# Patient Record
Sex: FEMALE | Race: WHITE | NOT HISPANIC OR LATINO | Employment: FULL TIME | URBAN - METROPOLITAN AREA
[De-identification: names, ages, dates, MRNs, and addresses within clinical notes are randomized per-mention and may not be internally consistent; named-entity substitution may affect disease eponyms.]

---

## 2017-02-08 ENCOUNTER — OFFICE VISIT (OUTPATIENT)
Dept: OBGYN | Facility: CLINIC | Age: 32
End: 2017-02-08
Attending: ADVANCED PRACTICE MIDWIFE
Payer: COMMERCIAL

## 2017-02-08 VITALS
HEIGHT: 70 IN | BODY MASS INDEX: 32.93 KG/M2 | WEIGHT: 230 LBS | DIASTOLIC BLOOD PRESSURE: 67 MMHG | SYSTOLIC BLOOD PRESSURE: 104 MMHG

## 2017-02-08 DIAGNOSIS — O09.91 SUPERVISION OF HIGH RISK PREGNANCY IN FIRST TRIMESTER: Primary | ICD-10-CM

## 2017-02-08 DIAGNOSIS — Z34.81 ENCOUNTER FOR SUPERVISION OF OTHER NORMAL PREGNANCY IN FIRST TRIMESTER: Primary | ICD-10-CM

## 2017-02-08 DIAGNOSIS — Z98.891 HISTORY OF C-SECTION: ICD-10-CM

## 2017-02-08 LAB
ABO + RH BLD: NORMAL
ABO + RH BLD: NORMAL
BASOPHILS # BLD AUTO: 0 10E9/L (ref 0–0.2)
BASOPHILS NFR BLD AUTO: 0.2 %
BLD GP AB SCN SERPL QL: NORMAL
BLOOD BANK CMNT PATIENT-IMP: NORMAL
DEPRECATED CALCIDIOL+CALCIFEROL SERPL-MC: 28 UG/L (ref 20–75)
DIFFERENTIAL METHOD BLD: ABNORMAL
EOSINOPHIL # BLD AUTO: 0 10E9/L (ref 0–0.7)
EOSINOPHIL NFR BLD AUTO: 0.4 %
ERYTHROCYTE [DISTWIDTH] IN BLOOD BY AUTOMATED COUNT: 12.7 % (ref 10–15)
HBV SURFACE AG SERPL QL IA: NONREACTIVE
HCT VFR BLD AUTO: 39.8 % (ref 35–47)
HGB BLD-MCNC: 13.6 G/DL (ref 11.7–15.7)
HIV 1+2 AB+HIV1 P24 AG SERPL QL IA: NORMAL
IMM GRANULOCYTES # BLD: 0 10E9/L (ref 0–0.4)
IMM GRANULOCYTES NFR BLD: 0.4 %
LYMPHOCYTES # BLD AUTO: 2.3 10E9/L (ref 0.8–5.3)
LYMPHOCYTES NFR BLD AUTO: 20.8 %
MCH RBC QN AUTO: 31.1 PG (ref 26.5–33)
MCHC RBC AUTO-ENTMCNC: 34.2 G/DL (ref 31.5–36.5)
MCV RBC AUTO: 91 FL (ref 78–100)
MONOCYTES # BLD AUTO: 0.6 10E9/L (ref 0–1.3)
MONOCYTES NFR BLD AUTO: 5 %
NEUTROPHILS # BLD AUTO: 8.2 10E9/L (ref 1.6–8.3)
NEUTROPHILS NFR BLD AUTO: 73.2 %
NRBC # BLD AUTO: 0 10*3/UL
NRBC BLD AUTO-RTO: 0 /100
PLATELET # BLD AUTO: 252 10E9/L (ref 150–450)
RBC # BLD AUTO: 4.38 10E12/L (ref 3.8–5.2)
SPECIMEN EXP DATE BLD: NORMAL
WBC # BLD AUTO: 11.1 10E9/L (ref 4–11)

## 2017-02-08 PROCEDURE — 86780 TREPONEMA PALLIDUM: CPT | Performed by: ADVANCED PRACTICE MIDWIFE

## 2017-02-08 PROCEDURE — 86747 PARVOVIRUS ANTIBODY: CPT | Performed by: ADVANCED PRACTICE MIDWIFE

## 2017-02-08 PROCEDURE — 87086 URINE CULTURE/COLONY COUNT: CPT | Performed by: ADVANCED PRACTICE MIDWIFE

## 2017-02-08 PROCEDURE — 86901 BLOOD TYPING SEROLOGIC RH(D): CPT | Performed by: ADVANCED PRACTICE MIDWIFE

## 2017-02-08 PROCEDURE — 87389 HIV-1 AG W/HIV-1&-2 AB AG IA: CPT | Performed by: ADVANCED PRACTICE MIDWIFE

## 2017-02-08 PROCEDURE — 87340 HEPATITIS B SURFACE AG IA: CPT | Performed by: ADVANCED PRACTICE MIDWIFE

## 2017-02-08 PROCEDURE — 86850 RBC ANTIBODY SCREEN: CPT | Performed by: ADVANCED PRACTICE MIDWIFE

## 2017-02-08 PROCEDURE — 85025 COMPLETE CBC W/AUTO DIFF WBC: CPT | Performed by: ADVANCED PRACTICE MIDWIFE

## 2017-02-08 PROCEDURE — 36415 COLL VENOUS BLD VENIPUNCTURE: CPT | Performed by: ADVANCED PRACTICE MIDWIFE

## 2017-02-08 PROCEDURE — 86900 BLOOD TYPING SEROLOGIC ABO: CPT | Performed by: ADVANCED PRACTICE MIDWIFE

## 2017-02-08 PROCEDURE — 86645 CMV ANTIBODY IGM: CPT | Performed by: ADVANCED PRACTICE MIDWIFE

## 2017-02-08 PROCEDURE — 86762 RUBELLA ANTIBODY: CPT | Performed by: ADVANCED PRACTICE MIDWIFE

## 2017-02-08 PROCEDURE — 86644 CMV ANTIBODY: CPT | Performed by: ADVANCED PRACTICE MIDWIFE

## 2017-02-08 PROCEDURE — 76817 TRANSVAGINAL US OBSTETRIC: CPT | Mod: ZF

## 2017-02-08 PROCEDURE — 82306 VITAMIN D 25 HYDROXY: CPT | Performed by: ADVANCED PRACTICE MIDWIFE

## 2017-02-08 ASSESSMENT — PAIN SCALES - GENERAL: PAINLEVEL: MILD PAIN (2)

## 2017-02-08 NOTE — Clinical Note
2017       RE: Ani Aquino  [address]   [facility] [address]     Dear Colleague,    Thank you for referring your patient, Ani Aquino, to the [facility] at [facility]. Please see a copy of my visit note below.    Subjective:  31 year old female who presents to clinic for initiation of OB care.   at 7w1d with estimated date of delivery of Sep 26, 2017 based on 1st trimester US.    Symptoms since LMP include mild nausea.  Patient has tried these relief measures: small frequent meals.  Co-morbids: irregular heart beat, hx of   Medications: PNV, magnesium supplement  Reviewed dating ultrasound.   FOB involved, Moody, .     PERSONAL/SOCIAL HISTORY  Lives with their family.  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  Additional items: None    Objective  -VS: reviewed and within normal limits   -General appearance: no acute distress, patient is comfortable   NEUROLOGICAL/PSYCHIATRIC   - Orientated x3  -Mood and affect: normal   Genetic/Infection questionnaire completed, risks include maternal aunt with Down syndrome and nephew with Autism.    Assessment/Plan   at 7w1d  by 1st trimester US.   Encounter Diagnosis   Name Primary?     Supervision of high risk pregnancy in first trimester Yes     Orders Placed This Encounter   Procedures     25- OH-Vitamin D     Anti Treponema     CBC with Platelets Differential     Hepatitis B Surface Antigen     HIV Antigen Antibody Combo     Rubella Antibody IgG Quantitative     Parvovirus B19 antibodies IgG IgM     CMV Antibody IgG     CMV antibody IgM     ABO/Rh Type and Screen     Orders Placed This Encounter   Medications     Docosahexaenoic Acid (PRENATAL DHA PO)     Sig: Take 1 tablet by mouth daily     - Oriented to Practice, types of care, and how to reach a provider.   - Patient received 1st trimester new OB education packet complete with aide of  The Expectant Family booklet including information on genetic screening test options.    - Patient would like to discuss options further at new OB visit which will be ordered at 1st OB exam.  - Patient was encouraged to start prenatal vitamins as tolerated.    - Patient was sent to lab for routine OB labs including Parvo and CMV.     - Pregnancy concerns to be addressed by provider at new OB exam include: previous C/S x1.  - Last pap 2016 NILM, hx of abnormal with possible biopsy ~6 yrs ago.  HILLARY signed and sent.  - GC/CT at next visit  - Patient instructed to schedule new OB exam with provider in 3 weeks    Again, thank you for allowing me to participate in the care of your patient.      Sincerely,    JENNIFER Otero CNM

## 2017-02-08 NOTE — Clinical Note
2017       RE: Ani Aquino  [address]   [facility] [address]     Dear Colleague,    Thank you for referring your patient, Ani Aquino, to the [facility] at [facility]. Please see a copy of my visit note below.    31 year old female, , with LMP unknown  presents for confirmation of dates and assessment of viability. This study was done transvaginally.    Measurements     CRL = 11.1 mm = 7 1/7 weeks  EGA.   SARAH = 17.     Fetal anatomy appears normal for gestational age.     Fetal/Fetal Cardiac Activity: Present.  FHR = 143bpm.     Implantation: Intrauterine.     Cervix = 4.8 cm      Maternal structures appear normal.    Impression: Viable intrauterine pregnancy at 7+1 weeks.     Recommend comprehensive scan at 18 to 20 weeks.    RANDALL Fisher MD          Again, thank you for allowing me to participate in the care of your patient.      Sincerely,    [facility]

## 2017-02-08 NOTE — PROGRESS NOTES
Subjective:  31 year old female who presents to clinic for initiation of OB care.   at 7w1d with estimated date of delivery of Sep 26, 2017 based on 1st trimester US.    Symptoms since LMP include mild nausea.  Patient has tried these relief measures: small frequent meals.  Co-morbids: irregular heart beat, hx of   Medications: PNV, magnesium supplement  Reviewed dating ultrasound.   FOB involved, Moody, .     PERSONAL/SOCIAL HISTORY  Lives with their family.  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  Additional items: None    Objective  -VS: reviewed and within normal limits   -General appearance: no acute distress, patient is comfortable   NEUROLOGICAL/PSYCHIATRIC   - Orientated x3  -Mood and affect: normal   Genetic/Infection questionnaire completed, risks include maternal aunt with Down syndrome and nephew with Autism.    Assessment/Plan   at 7w1d  by 1st trimester US.   Encounter Diagnosis   Name Primary?     Supervision of high risk pregnancy in first trimester Yes     Orders Placed This Encounter   Procedures     25- OH-Vitamin D     Anti Treponema     CBC with Platelets Differential     Hepatitis B Surface Antigen     HIV Antigen Antibody Combo     Rubella Antibody IgG Quantitative     Parvovirus B19 antibodies IgG IgM     CMV Antibody IgG     CMV antibody IgM     ABO/Rh Type and Screen     Orders Placed This Encounter   Medications     Docosahexaenoic Acid (PRENATAL DHA PO)     Sig: Take 1 tablet by mouth daily     - Oriented to Practice, types of care, and how to reach a provider.   - Patient received 1st trimester new OB education packet complete with aide of The Expectant Family booklet including information on genetic screening test options.    - Patient would like to discuss options further at new OB visit which will be ordered at 1st OB exam.  - Patient was encouraged to start prenatal vitamins as tolerated.    - Patient was sent to lab  for routine OB labs including Parvo and CMV.     - Pregnancy concerns to be addressed by provider at new OB exam include: previous C/S x1.  - Last pap 2016 NILM, hx of abnormal with possible biopsy ~6 yrs ago.  HILLARY signed and sent.  - GC/CT at next visit  - Patient instructed to schedule new OB exam with provider in 3 weeks

## 2017-02-08 NOTE — Clinical Note
Date:February 9, 2017      Patient was self referred, no letter generated. Do not send.        [facility] Health Information

## 2017-02-08 NOTE — Clinical Note
Date:February 9, 2017      Patient was self referred, no letter generated. Do not send.        [facility] Health Information

## 2017-02-08 NOTE — PROGRESS NOTES
31 year old female, , with LMP unknown  presents for confirmation of dates and assessment of viability. This study was done transvaginally.    Measurements     CRL = 11.1 mm = 7 1/7 weeks  EGA.   SARAH = 17.     Fetal anatomy appears normal for gestational age.     Fetal/Fetal Cardiac Activity: Present.  FHR = 143bpm.     Implantation: Intrauterine.     Cervix = 4.8 cm      Maternal structures appear normal.    Impression: Viable intrauterine pregnancy at 7+1 weeks.     Recommend comprehensive scan at 18 to 20 weeks.    RANDALL Fisher MD

## 2017-02-09 LAB
BACTERIA SPEC CULT: NO GROWTH
CMV IGG SERPL QL IA: 2.5 AI (ref 0–0.8)
CMV IGM SERPL QL IA: 0.5 AI (ref 0–0.8)
Lab: NORMAL
MICRO REPORT STATUS: NORMAL
RUBV IGG SERPL IA-ACNC: 9 IU/ML
SPECIMEN SOURCE: NORMAL
T PALLIDUM IGG+IGM SER QL: NEGATIVE

## 2017-02-10 PROBLEM — E55.9 VITAMIN D DEFICIENCY: Status: ACTIVE | Noted: 2017-02-10

## 2017-02-10 LAB
B19V IGG SER IA-ACNC: 5.13
B19V IGM SER IA-ACNC: 0.23

## 2017-02-19 ASSESSMENT — ENCOUNTER SYMPTOMS
HYPOTENSION: 0
JAUNDICE: 0
VOMITING: 0
BOWEL INCONTINENCE: 0
SLEEP DISTURBANCES DUE TO BREATHING: 0
HYPERTENSION: 0
SMELL DISTURBANCE: 0
EYE REDNESS: 1
NAUSEA: 1
LEG SWELLING: 0
TASTE DISTURBANCE: 0
HOARSE VOICE: 0
HEARTBURN: 0
TACHYCARDIA: 0
SORE THROAT: 0
DIARRHEA: 0
BLOATING: 1
CLAUDICATION: 0
BLOOD IN STOOL: 0
ABDOMINAL PAIN: 1
SINUS PAIN: 0
RECTAL PAIN: 0
SINUS CONGESTION: 1
DOUBLE VISION: 0
EYE WATERING: 0
NECK MASS: 0
EYE IRRITATION: 1
EXERCISE INTOLERANCE: 0
LIGHT-HEADEDNESS: 0
TROUBLE SWALLOWING: 0
RECTAL BLEEDING: 0
LEG PAIN: 0
SYNCOPE: 0
ORTHOPNEA: 0
PALPITATIONS: 1
EYE PAIN: 0
CONSTIPATION: 1

## 2017-02-19 ASSESSMENT — ANXIETY QUESTIONNAIRES
GAD7 TOTAL SCORE: 0
7. FEELING AFRAID AS IF SOMETHING AWFUL MIGHT HAPPEN: 0 = NOT AT ALL
GAD7 TOTAL SCORE: 0

## 2017-02-20 ASSESSMENT — ANXIETY QUESTIONNAIRES: GAD7 TOTAL SCORE: 0

## 2017-02-22 ENCOUNTER — OFFICE VISIT (OUTPATIENT)
Dept: OBGYN | Facility: CLINIC | Age: 32
End: 2017-02-22
Attending: ADVANCED PRACTICE MIDWIFE
Payer: COMMERCIAL

## 2017-02-22 VITALS
WEIGHT: 227.1 LBS | HEIGHT: 70 IN | DIASTOLIC BLOOD PRESSURE: 79 MMHG | BODY MASS INDEX: 32.51 KG/M2 | HEART RATE: 90 BPM | SYSTOLIC BLOOD PRESSURE: 127 MMHG

## 2017-02-22 DIAGNOSIS — O09.899 RUBELLA NON-IMMUNE STATUS, ANTEPARTUM: ICD-10-CM

## 2017-02-22 DIAGNOSIS — Z23 ENCOUNTER FOR IMMUNIZATION: ICD-10-CM

## 2017-02-22 DIAGNOSIS — Z13.79 ENCOUNTER FOR GENETIC SCREENING FOR BIRTH DEFECT: ICD-10-CM

## 2017-02-22 DIAGNOSIS — O26.90 PREGNANCY RELATED CONDITION, UNSPECIFIED TRIMESTER: Primary | ICD-10-CM

## 2017-02-22 DIAGNOSIS — I49.9 IRREGULAR HEART BEAT: ICD-10-CM

## 2017-02-22 DIAGNOSIS — Z36.89 ENCOUNTER FOR FETAL ANATOMIC SURVEY: ICD-10-CM

## 2017-02-22 DIAGNOSIS — O09.91 SUPERVISION OF HIGH RISK PREGNANCY IN FIRST TRIMESTER: Primary | ICD-10-CM

## 2017-02-22 DIAGNOSIS — Z28.39 RUBELLA NON-IMMUNE STATUS, ANTEPARTUM: ICD-10-CM

## 2017-02-22 PROCEDURE — 99212 OFFICE O/P EST SF 10 MIN: CPT | Mod: ZF

## 2017-02-22 PROCEDURE — 90686 IIV4 VACC NO PRSV 0.5 ML IM: CPT | Mod: ZF

## 2017-02-22 PROCEDURE — 87491 CHLMYD TRACH DNA AMP PROBE: CPT | Performed by: ADVANCED PRACTICE MIDWIFE

## 2017-02-22 PROCEDURE — G0008 ADMIN INFLUENZA VIRUS VAC: HCPCS | Mod: ZF

## 2017-02-22 PROCEDURE — 25000128 H RX IP 250 OP 636: Mod: ZF

## 2017-02-22 PROCEDURE — 87591 N.GONORRHOEAE DNA AMP PROB: CPT | Performed by: ADVANCED PRACTICE MIDWIFE

## 2017-02-22 ASSESSMENT — ANXIETY QUESTIONNAIRES
5. BEING SO RESTLESS THAT IT IS HARD TO SIT STILL: NOT AT ALL
3. WORRYING TOO MUCH ABOUT DIFFERENT THINGS: NOT AT ALL
7. FEELING AFRAID AS IF SOMETHING AWFUL MIGHT HAPPEN: NOT AT ALL
GAD7 TOTAL SCORE: 0
1. FEELING NERVOUS, ANXIOUS, OR ON EDGE: NOT AT ALL
6. BECOMING EASILY ANNOYED OR IRRITABLE: NOT AT ALL
2. NOT BEING ABLE TO STOP OR CONTROL WORRYING: NOT AT ALL

## 2017-02-22 ASSESSMENT — PATIENT HEALTH QUESTIONNAIRE - PHQ9: 5. POOR APPETITE OR OVEREATING: NOT AT ALL

## 2017-02-22 NOTE — LETTER
2017       RE: Ani Aquino  [address]   [facility] [address]     Dear Colleague,    Thank you for referring your patient, Ani Aquino, to the [facility] at [facility]. Please see a copy of my visit note below.    SUBJECTIVE:    31 year old, female, , 9w1d,  who presents to the clinic today for a new ob visit.    Feels well. Has started PNV.  Estimated Date of Delivery: Sep 26, 2017   Sep 26, 2017 is calculated from dating US on 17, unknown LMP.      She has not had bleeding since her LMP.   She has had mild nausea. Weight loss has occurred.   This was a planned pregnancy.   FOB is involved,       OTHER CONCERNS:     ===========================================  ROS  PSYCHIATRIC:  Denies mood changes  PHQ9: 3  Social History   Substance Use Topics     Smoking status: Never Smoker     Smokeless tobacco: Never Used     Alcohol use No     History   Drug Use No     History   Smoking Status     Never Smoker   Smokeless Tobacco     Never Used       Alcohol use No     Family History   Problem Relation Age of Onset     DIABETES Maternal Aunt      DIABETES Maternal Uncle      Coronary Artery Disease Paternal Grandmother      Other Cancer Paternal Grandmother      Coronary Artery Disease Paternal Grandfather      Hypertension Father      Breast Cancer Mother      Depression Maternal Grandfather      Other Cancer Maternal Grandfather      Asthma Maternal Grandfather      OSTEOPOROSIS Maternal Grandfather      ============================================  MEDICAL HISTORY   No Known Allergies        Current Outpatient Prescriptions:      Docosahexaenoic Acid (PRENATAL DHA PO), Take 1 tablet by mouth daily, Disp: , Rfl:     Past Medical History   Diagnosis Date     Abnormal Pap smear      Irregular heart beat 2016     has had stress test, echo and holtor monitoring.         Past Surgical History   Procedure Laterality Date      " section       low transverse            Obstetric History       T1      TAB0   SAB0   E0   M0   L1       # Outcome Date GA Lbr Wilbur/2nd Weight Sex Delivery Anes PTL Lv   2 Current            1 Term 13 37w0d  3.345 kg (7 lb 6 oz) F CS-LTranv  N LIVE BIRTH      Name: [facility]          GYN History- HX of  abnormal Pap Smear , normal pap in 2016 per patient                                            History of STI: none    I personally reviewed the past social/family/medical and surgical history on the date of service.   I reviewed lab work done at Intake visit with patient.    OBJECTIVE:   PHYSICAL EXAM:  /79 (BP Location: Left arm, Patient Position: Chair)  Pulse 90  Ht 1.778 m (5' 10\")  Wt 103 kg (227 lb 1.6 oz)  Breastfeeding? No  BMI 32.59 kg/m2  BMI- Body mass index is 32.59 kg/(m^2)., GENERAL:  Pleasant pregnant female, alert, cooperative and well groomed.  SKIN:  Warm and dry, without lesions or rashes  HEAD: Symmetrical features.  MOUTH:  Buccal mucosa pink, moist without lesions.  Teeth in good repair.    NECK:  Thyroid without enlargement and nodules.  Lymph nodes not palpable.   LUNGS:  Clear to auscultation.  BREAST:    No dominant, fixed or suspicious masses are noted.  No skin or nipple changes or axillary nodes.   Nipples everted.      HEART:  RRR without murmur.  ABDOMEN: Soft without masses , tenderness or organomegaly.  No CVA tenderness.  Uterus palpable at size equal to dates.  Well healed scar from  section. Fetal heart tones present.  MUSCULOSKELETAL:  Full range of motion  EXTREMITIES:  No edema. No significant varicosities.   PELVIC EXAM:  Deferred at patient request  GC/CHLAMYDIA CULTURE OBTAINED:YES    ASSESSMENT:  Intrauterine pregnancy 9w1d size consistent with dates  Genetic Screening: First Trimester Screen  Encounter Diagnoses   Name Primary?     Supervision of high risk pregnancy in first trimester Yes     Encounter for immunization      " Rubella non-immune status, antepartum      Encounter for fetal anatomic survey      Encounter for genetic screening for birth defect      Irregular heart beat         PLAN:  Orders Placed This Encounter   Procedures     HC FLU VAC PRESRV FREE QUAD SPLIT VIR 3+YRS IM     MAT FETAL MED CTR REFERRAL-PREGNANCY     No orders of the defined types were placed in this encounter.    - Reviewed use of triage nurse line and contacting the on-call provider after hours for an urgent need such as fever, vagina bleeding, bladder or vaginal infection, rupture of membranes,  or term labor.    - Reviewed best evidence for: weight gain for her weight and height for pregnancy:  RECOMMENDED WEIGHT GAIN: 15-25 lbs.   healthy diet and foods to avoid; exercise and activity during pregnancy;avoiding exposure to toxoplasmosis; and maintenance of a generally healthy lifestyle.   - Discussed the harms, benefits, side effects and alternative therapies for current prescribed and OTC medications.    - All pt's questions discussed and answered.  Pt verbalized understanding of and agreement to plan of care.     - Continue scheduled prenatal care and prn if questions or concerns    JENNIFER Harper CNM             Again, thank you for allowing me to participate in the care of your patient.      Sincerely,    JENNIFER Harper CNM

## 2017-02-22 NOTE — PROGRESS NOTES
SUBJECTIVE:    31 year old, female, , 9w1d,  who presents to the clinic today for a new ob visit.    Feels well. Has started PNV.  Estimated Date of Delivery: Sep 26, 2017   Sep 26, 2017 is calculated from dating US on 17, unknown LMP.      She has not had bleeding since her LMP.   She has had mild nausea. Weight loss has occurred.   This was a planned pregnancy.   FOB is involved,       OTHER CONCERNS:     ===========================================  ROS  PSYCHIATRIC:  Denies mood changes  PHQ9: 3  Social History   Substance Use Topics     Smoking status: Never Smoker     Smokeless tobacco: Never Used     Alcohol use No     History   Drug Use No     History   Smoking Status     Never Smoker   Smokeless Tobacco     Never Used       Alcohol use No     Family History   Problem Relation Age of Onset     DIABETES Maternal Aunt      DIABETES Maternal Uncle      Coronary Artery Disease Paternal Grandmother      Other Cancer Paternal Grandmother      Coronary Artery Disease Paternal Grandfather      Hypertension Father      Breast Cancer Mother      Depression Maternal Grandfather      Other Cancer Maternal Grandfather      Asthma Maternal Grandfather      OSTEOPOROSIS Maternal Grandfather      ============================================  MEDICAL HISTORY   No Known Allergies        Current Outpatient Prescriptions:      Docosahexaenoic Acid (PRENATAL DHA PO), Take 1 tablet by mouth daily, Disp: , Rfl:     Past Medical History   Diagnosis Date     Abnormal Pap smear      Irregular heart beat 2016     has had stress test, echo and holtor monitoring.         Past Surgical History   Procedure Laterality Date      section       low transverse            Obstetric History       T1      TAB0   SAB0   E0   M0   L1       # Outcome Date GA Lbr Wilbur/2nd Weight Sex Delivery Anes PTL Lv   2 Current            1 Term 13 37w0d  3.345 kg (7 lb 6 oz) F CS-LTranv  N LIVE BIRTH      Name:  [facility]          GYN History- HX of  abnormal Pap Smear 2009, normal pap in 2016 per patient                                            History of STI: none    I personally reviewed the past social/family/medical and surgical history on the date of service.   I reviewed lab work done at Intake visit with patient.    OBJECTIVE:   PHYSICAL EXAM:  /79 (BP Location: Left arm, Patient Position: Chair)  Pulse 90  Ht 1.778 m (5' 10\")  Wt 103 kg (227 lb 1.6 oz)  Breastfeeding? No  BMI 32.59 kg/m2  BMI- Body mass index is 32.59 kg/(m^2)., GENERAL:  Pleasant pregnant female, alert, cooperative and well groomed.  SKIN:  Warm and dry, without lesions or rashes  HEAD: Symmetrical features.  MOUTH:  Buccal mucosa pink, moist without lesions.  Teeth in good repair.    NECK:  Thyroid without enlargement and nodules.  Lymph nodes not palpable.   LUNGS:  Clear to auscultation.  BREAST:    No dominant, fixed or suspicious masses are noted.  No skin or nipple changes or axillary nodes.   Nipples everted.      HEART:  RRR without murmur.  ABDOMEN: Soft without masses , tenderness or organomegaly.  No CVA tenderness.  Uterus palpable at size equal to dates.  Well healed scar from  section. Fetal heart tones present.  MUSCULOSKELETAL:  Full range of motion  EXTREMITIES:  No edema. No significant varicosities.   PELVIC EXAM:  Deferred at patient request  GC/CHLAMYDIA CULTURE OBTAINED:YES    ASSESSMENT:  Intrauterine pregnancy 9w1d size consistent with dates  Genetic Screening: First Trimester Screen  Encounter Diagnoses   Name Primary?     Supervision of high risk pregnancy in first trimester Yes     Encounter for immunization      Rubella non-immune status, antepartum      Encounter for fetal anatomic survey      Encounter for genetic screening for birth defect      Irregular heart beat         PLAN:  Orders Placed This Encounter   Procedures     HC FLU VAC PRESRV FREE QUAD SPLIT VIR 3+YRS IM     MAT FETAL MED CTR " REFERRAL-PREGNANCY     No orders of the defined types were placed in this encounter.    - Reviewed use of triage nurse line and contacting the on-call provider after hours for an urgent need such as fever, vagina bleeding, bladder or vaginal infection, rupture of membranes,  or term labor.    - Reviewed best evidence for: weight gain for her weight and height for pregnancy:  RECOMMENDED WEIGHT GAIN: 15-25 lbs.   healthy diet and foods to avoid; exercise and activity during pregnancy;avoiding exposure to toxoplasmosis; and maintenance of a generally healthy lifestyle.   - Discussed the harms, benefits, side effects and alternative therapies for current prescribed and OTC medications.    - All pt's questions discussed and answered.  Pt verbalized understanding of and agreement to plan of care.     - Continue scheduled prenatal care and prn if questions or concerns    JENNIFER Harper CNM

## 2017-02-22 NOTE — LETTER
Date:February 28, 2017      Patient was self referred, no letter generated. Do not send.        [facility] Health Information

## 2017-02-22 NOTE — MR AVS SNAPSHOT
After Visit Summary   2/22/2017    Ani Aquino    MRN: [id number]           Patient Information     Date Of Birth          1985        Visit Information        Provider Department      2/22/2017 1:15 PM Orin Mondragon APRN CNM [facility]        Today's Diagnoses     Supervision of high risk pregnancy in first trimester    -  1    Encounter for immunization        Rubella non-immune status, antepartum        Encounter for fetal anatomic survey        Encounter for genetic screening for birth defect        Irregular heart beat           Follow-ups after your visit        Additional Services     MAT FETAL MED CTR REFERRAL-PREGNANCY       >> Patient may proceed with recommendations for further testing as directed by the Maternal Fetal Medicine Specialist >>    >> If requesting Fetal Echo: MFM will determine appropriate location for exam due to indication.    >> If requesting Lung Maturity Amnio:  If results indicate fetal lung maturity, induction or C/S is recommended within 36 hours.  Please schedule accordingly.     Dear Patient:   Please be aware that coverage of these services is subject to the terms and limitations of your health insurance plan.  Call member services at your health plan with any benefit or coverage questions.      Please bring the following to your appointment:    >>  Any x-rays, CTs or MRIs which have been performed.  Contact the facility where they were done to arrange for  prior to your scheduled appointment.  Any new CT, MRI or other procedures ordered by your specialist must be performed at a [place] facility or coordinated by your clinic's referral office.  >>  List of current medications   >>  This referral request   >>  Any documents/labs given to you for this referral                  Your next 10 appointments already scheduled     Mar 22, 2017  1:30 PM CDT   RETURN OB with JENNIFER Cody CNM   [facility]  "([facility])    [facility] [address]  [address]  [facility] [address] [phone]              Who to contact     Please call your clinic at [phone] to:    Ask questions about your health    Make or cancel appointments    Discuss your medicines    Learn about your test results    Speak to your doctor   If you have compliments or concerns about an experience at your clinic, or if you wish to file a complaint, please contact [facility] Patient Relations at [phone] or email us at [email or web address]         Additional Information About Your Visit        [employer] Information     [employer] gives you secure access to your electronic health record. If you see a primary care provider, you can also send messages to your care team and make appointments. If you have questions, please call your primary care clinic.  If you do not have a primary care provider, please call [phone] and they will assist you.      [employer] is an electronic gateway that provides easy, online access to your medical records. With [employer], you can request a clinic appointment, read your test results, renew a prescription or communicate with your care team.     To access your existing account, please contact your [facility] or call [phone] for assistance.        Care EveryWhere ID     This is your Care EveryWhere ID. This could be used by other organizations to access your [place] medical records  [id number]        Your Vitals Were     Pulse Height Breastfeeding? BMI (Body Mass Index)          90 1.778 m (5' 10\") No 32.59 kg/m2         Blood Pressure from Last 3 Encounters:   02/22/17 127/79   02/08/17 104/67    Weight from Last 3 Encounters:   02/22/17 103 kg (227 lb 1.6 oz)   02/08/17 104.3 kg (230 lb)              We Performed the Following     Chlamydia by PCR     Gonorrhorea by PCR     HC FLU VAC PRESRV FREE QUAD " SPLIT VIR 3+YRS IM     MAT FETAL MED CTR REFERRAL-PREGNANCY        Primary Care Provider    None Specified       No primary provider on file.        Thank you!     Thank you for choosing [facility]  for your care. Our goal is always to provide you with excellent care. Hearing back from our patients is one way we can continue to improve our services. Please take a few minutes to complete the written survey that you may receive in the mail after your visit with us. Thank you!             Your Updated Medication List - Protect others around you: Learn how to safely use, store and throw away your medicines at www.disposemymeds.org.          This list is accurate as of: 2/22/17  2:03 PM.  Always use your most recent med list.                   Brand Name Dispense Instructions for use    PRENATAL DHA PO      Take 1 tablet by mouth daily

## 2017-02-22 NOTE — NURSING NOTE
Chief Complaint   Patient presents with     Prenatal Care     9w1d       See JOCELYN Hernandez 2/22/2017    FLU VACCINE QUESTIONNAIRE:  Ask the following questions of all parties who want influenza vaccination:     CONTRAINDICATIONS  1.  Is the patient age less than 6 months?  NO  2.  Has the person to be vaccinated ever had Guillain-Boone syndrome? NO  3.  Has the person to be vaccinated had the vaccine this year? NO  4.  Is the person to be vaccinated sick today? NO  5.  Does the person to be vaccinated have an allergy to eggs or a component of the vaccine? NO  6.  Has the person to vaccinated ever had a serious reaction to an influenza vaccination in the past? NO                             INFLUENZA VACCINATION NOTE      Information sheet given to patient and questions answered.

## 2017-02-23 LAB
C TRACH DNA SPEC QL NAA+PROBE: NORMAL
N GONORRHOEA DNA SPEC QL NAA+PROBE: NORMAL
SPECIMEN SOURCE: NORMAL
SPECIMEN SOURCE: NORMAL

## 2017-02-23 ASSESSMENT — PATIENT HEALTH QUESTIONNAIRE - PHQ9: SUM OF ALL RESPONSES TO PHQ QUESTIONS 1-9: 3

## 2017-03-22 ENCOUNTER — OFFICE VISIT (OUTPATIENT)
Dept: OBGYN | Facility: CLINIC | Age: 32
End: 2017-03-22
Attending: ADVANCED PRACTICE MIDWIFE
Payer: COMMERCIAL

## 2017-03-22 VITALS
DIASTOLIC BLOOD PRESSURE: 75 MMHG | SYSTOLIC BLOOD PRESSURE: 120 MMHG | HEIGHT: 70 IN | WEIGHT: 226.6 LBS | BODY MASS INDEX: 32.44 KG/M2

## 2017-03-22 DIAGNOSIS — O09.91 SUPERVISION OF HIGH RISK PREGNANCY IN FIRST TRIMESTER: Primary | ICD-10-CM

## 2017-03-22 PROCEDURE — 99211 OFF/OP EST MAY X REQ PHY/QHP: CPT | Mod: ZF

## 2017-03-22 ASSESSMENT — PAIN SCALES - GENERAL: PAINLEVEL: NO PAIN (0)

## 2017-03-22 NOTE — MR AVS SNAPSHOT
After Visit Summary   3/22/2017    Ani Aquino    MRN: [id number]           Patient Information     Date Of Birth          1985        Visit Information        Provider Department      3/22/2017 1:30 PM Lois French APRN CNM [facility]        Today's Diagnoses     Supervision of high risk WHS CNM care    -  1       Follow-ups after your visit        Additional Services     MAT FETAL MED CTR REFERRAL-PREGNANCY       >> Patient may proceed with recommendations for further testing as directed by the Maternal Fetal Medicine Specialist >>    >> If requesting Fetal Echo: MFM will determine appropriate location for exam due to indication.    >> If requesting Lung Maturity Amnio:  If results indicate fetal lung maturity, induction or C/S is recommended within 36 hours.  Please schedule accordingly.     Dear Patient:   Please be aware that coverage of these services is subject to the terms and limitations of your health insurance plan.  Call member services at your health plan with any benefit or coverage questions.      Please bring the following to your appointment:    >>  Any x-rays, CTs or MRIs which have been performed.  Contact the facility where they were done to arrange for  prior to your scheduled appointment.  Any new CT, MRI or other procedures ordered by your specialist must be performed at a [facility] facility or coordinated by your clinic's referral office.  >>  List of current medications   >>  This referral request   >>  Any documents/labs given to you for this referral            MAT FETAL MED CTR REFERRAL-PREGNANCY       >> Patient may proceed with recommendations for further testing as directed by the Maternal Fetal Medicine Specialist >>    >> If requesting Fetal Echo: MFM will determine appropriate location for exam due to indication.    >> If requesting Lung Maturity Amnio:  If results indicate fetal lung maturity, induction or C/S is recommended within  36 hours.  Please schedule accordingly.     Dear Patient:   Please be aware that coverage of these services is subject to the terms and limitations of your health insurance plan.  Call member services at your health plan with any benefit or coverage questions.      Please bring the following to your appointment:    >>  Any x-rays, CTs or MRIs which have been performed.  Contact the facility where they were done to arrange for  prior to your scheduled appointment.  Any new CT, MRI or other procedures ordered by your specialist must be performed at a [facility] facility or coordinated by your clinic's referral office.  >>  List of current medications   >>  This referral request   >>  Any documents/labs given to you for this referral                  Follow-up notes from your care team     Return in about 4 weeks (around 4/19/2017).      Who to contact     Please call your clinic at [phone] to:    Ask questions about your health    Make or cancel appointments    Discuss your medicines    Learn about your test results    Speak to your doctor   If you have compliments or concerns about an experience at your clinic, or if you wish to file a complaint, please contact [facility] Patient Relations at [phone] or email us at [email or web address]         Additional Information About Your Visit        [employer] Information     [employer] gives you secure access to your electronic health record. If you see a primary care provider, you can also send messages to your care team and make appointments. If you have questions, please call your primary care clinic.  If you do not have a primary care provider, please call [phone] and they will assist you.      [employer] is an electronic gateway that provides easy, online access to your medical records. With [employer], you can request a clinic appointment, read your test results, renew a prescription or communicate with your care team.    "  To access your existing account, please contact your [facility] or call [phone] for assistance.        Care EveryWhere ID     This is your Care EveryWhere ID. This could be used by other organizations to access your [place] medical records  [id number]        Your Vitals Were     Height BMI (Body Mass Index)                1.778 m (5' 10\") 32.51 kg/m2           Blood Pressure from Last 3 Encounters:   03/22/17 120/75   02/22/17 127/79   02/08/17 104/67    Weight from Last 3 Encounters:   03/22/17 102.8 kg (226 lb 9.6 oz)   02/22/17 103 kg (227 lb 1.6 oz)   02/08/17 104.3 kg (230 lb)              We Performed the Following     MAT FETAL MED CTR REFERRAL-PREGNANCY     MAT FETAL MED CTR REFERRAL-PREGNANCY        Primary Care Provider    None Specified       No primary provider on file.        Thank you!     Thank you for choosing [facility]  for your care. Our goal is always to provide you with excellent care. Hearing back from our patients is one way we can continue to improve our services. Please take a few minutes to complete the written survey that you may receive in the mail after your visit with us. Thank you!             Your Updated Medication List - Protect others around you: Learn how to safely use, store and throw away your medicines at www.disposemymeds.org.          This list is accurate as of: 3/22/17  1:42 PM.  Always use your most recent med list.                   Brand Name Dispense Instructions for use    PRENATAL DHA PO      Take 1 tablet by mouth daily         "

## 2017-03-22 NOTE — PROGRESS NOTES
"Subjective:      31 year old  at 13w1d presents for a routine prenatal appointment.       no vaginal bleeding or leakage of fluid.  no contractions or cramping.    slight fetal movement.       No HA, visual changes, RUQ or epigastric pain.   The patient presents with the following concerns: some LBP,  Nosebleeds    Level II US   . Ordered   Objective:  Vitals:    17 1326   BP: 120/75   Weight: 102.8 kg (226 lb 9.6 oz)   Height: 1.778 m (5' 10\")     See OB flowsheet    Assessment/Plan     Encounter Diagnosis   Name Primary?     Supervision of high risk WHS CNM care Yes     Orders Placed This Encounter   Procedures     MAT FETAL MED CTR REFERRAL-PREGNANCY       - Reviewed total weight gain, encouraged continued healthy diet and exercise.      - Reviewed why/how to contact provider.    Patient education/orders or handouts today:  level 2 u/s scheduled   Return to clinic in 5weeks and prn if questions or concerns.   JENNIFER Cody CNM              "

## 2017-03-22 NOTE — LETTER
"3/22/2017       RE: Ani Aquino  [address] [facility] [address]     Dear Colleague,    Thank you for referring your patient, Ani Aquino, to the [facility] at [facility]. Please see a copy of my visit note below.    Subjective:      31 year old  at 13w1d presents for a routine prenatal appointment.       no vaginal bleeding or leakage of fluid.  no contractions or cramping.    slight fetal movement.       No HA, visual changes, RUQ or epigastric pain.   The patient presents with the following concerns: some LBP,  Nosebleeds    Level II US   . Ordered   Objective:  Vitals:    17 1326   BP: 120/75   Weight: 102.8 kg (226 lb 9.6 oz)   Height: 1.778 m (5' 10\")     See OB flowsheet    Assessment/Plan     Encounter Diagnosis   Name Primary?     Supervision of high risk WHS CNM care Yes     Orders Placed This Encounter   Procedures     MAT FETAL MED CTR REFERRAL-PREGNANCY       - Reviewed total weight gain, encouraged continued healthy diet and exercise.      - Reviewed why/how to contact provider.    Patient education/orders or handouts today:  level 2 u/s scheduled   Return to clinic in 5weeks and prn if questions or concerns.   JENNIFER Cody CNM                Again, thank you for allowing me to participate in the care of your patient.      Sincerely,    JENNIFER Cody CNM      "

## 2017-03-23 ENCOUNTER — TELEPHONE (OUTPATIENT)
Dept: MATERNAL FETAL MEDICINE | Facility: CLINIC | Age: 32
End: 2017-03-23

## 2017-04-26 ENCOUNTER — PRE VISIT (OUTPATIENT)
Dept: MATERNAL FETAL MEDICINE | Facility: CLINIC | Age: 32
End: 2017-04-26

## 2017-04-28 ENCOUNTER — OFFICE VISIT (OUTPATIENT)
Dept: OBGYN | Facility: CLINIC | Age: 32
End: 2017-04-28
Attending: MIDWIFE
Payer: COMMERCIAL

## 2017-04-28 ENCOUNTER — HOSPITAL ENCOUNTER (OUTPATIENT)
Dept: ULTRASOUND IMAGING | Facility: CLINIC | Age: 32
Discharge: HOME | End: 2017-04-28
Attending: MIDWIFE | Admitting: OBSTETRICS & GYNECOLOGY
Payer: COMMERCIAL

## 2017-04-28 ENCOUNTER — OFFICE VISIT (OUTPATIENT)
Dept: MATERNAL FETAL MEDICINE | Facility: CLINIC | Age: 32
End: 2017-04-28
Attending: MIDWIFE
Payer: COMMERCIAL

## 2017-04-28 VITALS
WEIGHT: 228.3 LBS | SYSTOLIC BLOOD PRESSURE: 127 MMHG | HEART RATE: 85 BPM | BODY MASS INDEX: 32.69 KG/M2 | HEIGHT: 70 IN | DIASTOLIC BLOOD PRESSURE: 78 MMHG

## 2017-04-28 DIAGNOSIS — O09.91 SUPERVISION OF HIGH RISK PREGNANCY IN FIRST TRIMESTER: Primary | ICD-10-CM

## 2017-04-28 DIAGNOSIS — O99.412 MATERNAL ARRHYTHMIA AFFECTING PREGNANCY IN SECOND TRIMESTER, ANTEPARTUM: ICD-10-CM

## 2017-04-28 DIAGNOSIS — I49.9 MATERNAL ARRHYTHMIA AFFECTING PREGNANCY IN SECOND TRIMESTER, ANTEPARTUM: ICD-10-CM

## 2017-04-28 DIAGNOSIS — O26.90 PREGNANCY RELATED CONDITION, UNSPECIFIED TRIMESTER: ICD-10-CM

## 2017-04-28 DIAGNOSIS — Z36.89 ENCOUNTER FOR FETAL ANATOMIC SURVEY: Primary | ICD-10-CM

## 2017-04-28 PROCEDURE — 99211 OFF/OP EST MAY X REQ PHY/QHP: CPT | Mod: 25

## 2017-04-28 PROCEDURE — 76811 OB US DETAILED SNGL FETUS: CPT

## 2017-04-28 PROCEDURE — 76805 OB US >/= 14 WKS SNGL FETUS: CPT

## 2017-04-28 ASSESSMENT — PAIN SCALES - GENERAL: PAINLEVEL: NO PAIN (0)

## 2017-04-28 NOTE — PROGRESS NOTES
"Subjective:      31 year old  at 18w3d presents for a routine prenatal appointment.       no vaginal bleeding or leakage of fluid.  no contractions or cramping.    good  fetal movement.     reveiwed normal level 2 US    No HA, visual changes, RUQ or epigastric pain.   The patient presents with the following concerns: feeling good    Level II US     Objective:  Vitals:    17 1535   BP: 127/78   BP Location: Left arm   Patient Position: Chair   Cuff Size: Adult Regular   Pulse: 85   Weight: 103.6 kg (228 lb 4.8 oz)   Height: 1.778 m (5' 10\")     See OB flowsheet    Assessment/Plan   supervision of high risk pregnancy      - Reviewed total weight gain, encouraged continued healthy diet and exercise.      - Reviewed why/how to contact provider.    Patient education/orders or handouts today:  PTL signs/symptoms and fetal movement   Return to clinic in 5 weeks and prn if questions or concerns.   JENNIFER Cody CNM              "

## 2017-04-28 NOTE — MR AVS SNAPSHOT
After Visit Summary   4/28/2017    Ani Aquino    MRN: [id number]           Patient Information     Date Of Birth          1985        Visit Information        Provider Department      4/28/2017 2:45 PM Nataliia Lyman, DO [facility]        Today's Diagnoses     Encounter for fetal anatomic survey    -  1    Maternal arrhythmia affecting pregnancy in second trimester, antepartum           Follow-ups after your visit        Your next 10 appointments already scheduled     Apr 28, 2017  3:30 PM CDT   RETURN OB with JENNIFER Cody CNM   [facility] ([facility])    [facility] [address]  [address]  [address]  [facility] [id number] [phone]              Future tests that were ordered for you today     Open Future Orders        Priority Expected Expires Ordered    Maternal Fetal OB Complete 2/3 Tri Sngle Routine  1/22/2018 3/22/2017            Who to contact     If you have questions or need follow up information about today's clinic visit or your schedule please contact [facility] directly at [phone].  Normal or non-critical lab and imaging results will be communicated to you by [employer], letter or phone within 4 business days after the clinic has received the results. If you do not hear from us within 7 days, please contact the clinic through [employer] or phone. If you have a critical or abnormal lab result, we will notify you by phone as soon as possible.  Submit refill requests through [employer] or call your pharmacy and they will forward the refill request to us. Please allow 3 business days for your refill to be completed.          Additional Information About Your Visit        [employer] Information     [employer] gives you secure access to your electronic health record. If you see a primary care provider, you can also send messages to your care team and make  appointments. If you have questions, please call your primary care clinic.  If you do not have a primary care provider, please call [phone] and they will assist you.        Care EveryWhere ID     This is your Care EveryWhere ID. This could be used by other organizations to access your [place] medical records  [id number]         Blood Pressure from Last 3 Encounters:   03/22/17 120/75   02/22/17 127/79   02/08/17 104/67    Weight from Last 3 Encounters:   03/22/17 102.8 kg (226 lb 9.6 oz)   02/22/17 103 kg (227 lb 1.6 oz)   02/08/17 104.3 kg (230 lb)              Today, you had the following     No orders found for display       Primary Care Provider    None Specified       No primary provider on file.        Thank you!     Thank you for choosing [facility]  for your care. Our goal is always to provide you with excellent care. Hearing back from our patients is one way we can continue to improve our services. Please take a few minutes to complete the written survey that you may receive in the mail after your visit with us. Thank you!             Your Updated Medication List - Protect others around you: Learn how to safely use, store and throw away your medicines at www.disposemymeds.org.          This list is accurate as of: 4/28/17  2:58 PM.  Always use your most recent med list.                   Brand Name Dispense Instructions for use    PRENATAL DHA PO      Take 1 tablet by mouth daily

## 2017-04-28 NOTE — LETTER
"2017       RE: Ani Aquino  [address] [facility] [address] [facility] [address]     Dear Colleague,    Thank you for referring your patient, Ani Aquino, to the [facility] at [facility]. Please see a copy of my visit note below.    Subjective:      31 year old  at 18w3d presents for a routine prenatal appointment.       no vaginal bleeding or leakage of fluid.  no contractions or cramping.    good  fetal movement.     reveiwed normal level 2 US    No HA, visual changes, RUQ or epigastric pain.   The patient presents with the following concerns: feeling good    Level II US     Objective:  Vitals:    17 1535   BP: 127/78   BP Location: Left arm   Patient Position: Chair   Cuff Size: Adult Regular   Pulse: 85   Weight: 103.6 kg (228 lb 4.8 oz)   Height: 1.778 m (5' 10\")     See OB flowsheet    Assessment/Plan   supervision of high risk pregnancy      - Reviewed total weight gain, encouraged continued healthy diet and exercise.      - Reviewed why/how to contact provider.    Patient education/orders or handouts today:  PTL signs/symptoms and fetal movement   Return to clinic in 5 weeks and prn if questions or concerns.   JENNIFER Cody CNM                Again, thank you for allowing me to participate in the care of your patient.      Sincerely,    JENNIFER Cody CNM      "

## 2017-04-28 NOTE — NURSING NOTE
Chief Complaint   Patient presents with     Prenatal Care     Pt is doing well no concerns 18.3 week

## 2017-04-28 NOTE — LETTER
Date:May 2, 2017      Patient was self referred, no letter generated. Do not send.        [facility] Health Information

## 2017-04-28 NOTE — MR AVS SNAPSHOT
After Visit Summary   4/28/2017    Ani Aquino    MRN: [id number]           Patient Information     Date Of Birth          1985        Visit Information        Provider Department      4/28/2017 3:30 PM Lois French APRN CNM [facility]        Today's Diagnoses     Supervision of high risk WHS CNM care    -  1       Follow-ups after your visit        Follow-up notes from your care team     Return in about 5 weeks (around 6/2/2017).      Future tests that were ordered for you today     Open Future Orders        Priority Expected Expires Ordered    Maternal Fetal OB Complete 2/3 Tri Sngle Routine  1/22/2018 3/22/2017            Who to contact     Please call your clinic at [phone] to:    Ask questions about your health    Make or cancel appointments    Discuss your medicines    Learn about your test results    Speak to your doctor   If you have compliments or concerns about an experience at your clinic, or if you wish to file a complaint, please contact [facility] Patient Relations at [phone] or email us at [email or web address]         Additional Information About Your Visit        MyChart Information     [employer] gives you secure access to your electronic health record. If you see a primary care provider, you can also send messages to your care team and make appointments. If you have questions, please call your primary care clinic.  If you do not have a primary care provider, please call [phone] and they will assist you.      [employer] is an electronic gateway that provides easy, online access to your medical records. With [employer], you can request a clinic appointment, read your test results, renew a prescription or communicate with your care team.     To access your existing account, please contact your [facility] or call [phone] for assistance.        Care EveryWhere ID     This is your  "Care EveryWhere ID. This could be used by other organizations to access your [place] medical records  [id number]        Your Vitals Were     Pulse Height BMI (Body Mass Index)             85 1.778 m (5' 10\") 32.76 kg/m2          Blood Pressure from Last 3 Encounters:   04/28/17 127/78   03/22/17 120/75   02/22/17 127/79    Weight from Last 3 Encounters:   04/28/17 103.6 kg (228 lb 4.8 oz)   03/22/17 102.8 kg (226 lb 9.6 oz)   02/22/17 103 kg (227 lb 1.6 oz)              Today, you had the following     No orders found for display       Primary Care Provider    None Specified       No primary provider on file.        Thank you!     Thank you for choosing [facility]  for your care. Our goal is always to provide you with excellent care. Hearing back from our patients is one way we can continue to improve our services. Please take a few minutes to complete the written survey that you may receive in the mail after your visit with us. Thank you!             Your Updated Medication List - Protect others around you: Learn how to safely use, store and throw away your medicines at www.disposemymeds.org.          This list is accurate as of: 4/28/17  3:55 PM.  Always use your most recent med list.                   Brand Name Dispense Instructions for use    PRENATAL DHA PO      Take 1 tablet by mouth daily         "

## 2017-06-02 ENCOUNTER — OFFICE VISIT (OUTPATIENT)
Dept: OBGYN | Facility: CLINIC | Age: 32
End: 2017-06-02
Attending: OBSTETRICS & GYNECOLOGY
Payer: COMMERCIAL

## 2017-06-02 VITALS
SYSTOLIC BLOOD PRESSURE: 119 MMHG | HEIGHT: 70 IN | BODY MASS INDEX: 33.64 KG/M2 | WEIGHT: 235 LBS | DIASTOLIC BLOOD PRESSURE: 74 MMHG

## 2017-06-02 DIAGNOSIS — Z98.891 HISTORY OF C-SECTION: ICD-10-CM

## 2017-06-02 DIAGNOSIS — O09.91 SUPERVISION OF HIGH RISK PREGNANCY IN FIRST TRIMESTER: Primary | ICD-10-CM

## 2017-06-02 DIAGNOSIS — M25.551 HIP PAIN, RIGHT: ICD-10-CM

## 2017-06-02 PROCEDURE — 99211 OFF/OP EST MAY X REQ PHY/QHP: CPT | Mod: ZF

## 2017-06-02 ASSESSMENT — PAIN SCALES - GENERAL: PAINLEVEL: MILD PAIN (3)

## 2017-06-02 NOTE — LETTER
"2017       RE: Ani Aquino  [address] [address]     Dear Colleague,    Thank you for referring your patient, Ani Aquino, to the [facility] at [facility]. Please see a copy of my visit note below.    Subjective:      31 year old  at 23w3d presents for a routine prenatal appointment.         Denies contractions or cramping, vaginal bleeding or leakage of fluid.    Reports +fetal movement.       No HA, visual changes, RUQ or epigastric pain.     Patient concerns: Feeling well overall. Reports some right hip that radiates down her buttocks and also down the front of the leg. Worsens with lifting or moving of the leg. Improves with swimming, resting. Interested in physical therapy.     Level II US  Results reviewed normal scan.  Having a boy! States daughter at home is very excited.    Objective:  Vitals:    17 1255   BP: 119/74   Weight: 106.6 kg (235 lb)   Height: 1.778 m (5' 10\")      See OB flowsheet    Assessment/Plan     Encounter Diagnosis   Name Primary?     Supervision of high risk WHS CNM care Yes     Orders Placed This Encounter   Procedures     PHYSICAL THERAPY REFERRAL     Orders Placed This Encounter   Medications     order for DME     Sig: Maternity Belt order     Dispense:  1 each     Refill:  0     - Reviewed total weight gain, encouraged continued healthy diet and exercise.      - Reviewed why/how to contact provider.    Patient education/orders or handouts today:  PTL signs/symptoms, Fetal movement and Plan for EOB visit w labs     Reviewed level 2 ultrasound.  Discussed exercises/stretches for pt's right hip pain. Prescription for maternity belt given.  Physical therapy referral placed.  Next visit EOB.   Needs tolac consent and op report.   Continue scheduled prenatal care, RTC in approx 4 weeks for EOB and prn if questions or concerns.     JENNIFER Galeana, SYLWIA        "

## 2017-06-02 NOTE — MR AVS SNAPSHOT
"              After Visit Summary   2017    Ani Aquino    MRN: [id number]           Patient Information     Date Of Birth          1985        Visit Information        Provider Department      2017 1:00 PM Suman Ramachandran, [facility]        Today's Diagnoses     Supervision of high risk [facility] [employer] care    -  1    Hip pain, right        History of            Follow-ups after your visit        Additional Services     PHYSICAL THERAPY REFERRAL       *This therapy referral will be filtered to a centralized scheduling office at [facility] and the patient will receive a call to schedule an appointment at a [place] location most convenient for them. *     [facility] provides Physical Therapy evaluation and treatment and many specialty services across the [place] system.  If requesting a specialty program, please choose from the list below.    If you have not heard from the scheduling office within 2 business days, please call [phone] for all locations, with the exception of [place], please call [phone].  Treatment: Evaluation & Treatment  Special Instructions/Modalities:   Special Programs: n/a    Please be aware that coverage of these services is subject to the terms and limitations of your health insurance plan.  Call member services at your health plan with any benefit or coverage questions.      **Note to Provider:  If you are referring outside of [place] for the therapy appointment, please list the name of the location in the \"special instructions\" above, print the referral and give to the patient to schedule the appointment.                  Follow-up notes from your care team     Return in about 4 weeks (around 2017) for EOB- CNM.      Your next 10 appointments already scheduled     2017  4:00 PM CDT   Return Obstetrics Extended with JENNIFER Harper [facility] ([facility])    [facility] [address]  [address]  [facility] [id number] [phone]              Who to contact     Please call your clinic at [phone] to:    Ask questions about your health    Make or cancel appointments    Discuss your medicines    Learn about your test results    Speak to your doctor   If you have compliments or concerns about an experience at your clinic, or if you wish to file a complaint, please contact [facility] Patient Relations at [phone] or email us at [email or web address]         Additional Information About Your Visit        [email or web address] Information     [employer] gives you secure access to your electronic health record. If you see a primary care provider, you can also send messages to your care team and make appointments. If you have questions, please call your primary care clinic.  If you do not have a primary care provider, please call [phone] and they will assist you.      [employer] is an electronic gateway that provides easy, online access to your medical records. With [employer], you can request a clinic appointment, read your test results, renew a prescription or communicate with your care team.     To access your existing account, please contact your [facility] or call [phone] for assistance.        Care EveryWhere ID     This is your Care EveryWhere ID. This could be used by other organizations to access your [place] medical records  [id number]        Your Vitals Were     Height BMI (Body Mass Index)                1.778 m (5' 10\") 33.72 kg/m2           Blood Pressure from Last 3 Encounters:   06/02/17 119/74   04/28/17 127/78   03/22/17 120/75    Weight from Last 3 Encounters:   06/02/17 106.6 kg (235 lb)   04/28/17 103.6 kg (228 lb 4.8 oz)   03/22/17 102.8 kg (226 lb 9.6 oz)              We Performed the Following     PHYSICAL THERAPY " REFERRAL          Today's Medication Changes          These changes are accurate as of: 6/2/17 11:59 PM.  If you have any questions, ask your nurse or doctor.               Start taking these medicines.        Dose/Directions    order for DME   Used for:  Hip pain, right, Supervision of high risk pregnancy in first trimester   Started by:  Suman Ramachandran CNM        Maternity Belt order   Quantity:  1 each   Refills:  0            Where to get your medicines      Some of these will need a paper prescription and others can be bought over the counter.  Ask your nurse if you have questions.     Bring a paper prescription for each of these medications     order for DME                Primary Care Provider    None Specified       No primary provider on file.        Thank you!     Thank you for choosing [facility]  for your care. Our goal is always to provide you with excellent care. Hearing back from our patients is one way we can continue to improve our services. Please take a few minutes to complete the written survey that you may receive in the mail after your visit with us. Thank you!             Your Updated Medication List - Protect others around you: Learn how to safely use, store and throw away your medicines at www.disposemymeds.org.          This list is accurate as of: 6/2/17 11:59 PM.  Always use your most recent med list.                   Brand Name Dispense Instructions for use    order for DME     1 each    Maternity Belt order       PRENATAL DHA PO      Take 1 tablet by mouth daily

## 2017-06-02 NOTE — NURSING NOTE
Chief Complaint   Patient presents with     Prenatal Care   23.3 weeks ob visit hip pain that radiates down to vaginal area-  Better when laying dowstarted more than a month ago it started and progressivly getting worse.

## 2017-06-02 NOTE — PROGRESS NOTES
"Subjective:      31 year old  at 23w3d presents for a routine prenatal appointment.         Denies contractions or cramping, vaginal bleeding or leakage of fluid.    Reports +fetal movement.       No HA, visual changes, RUQ or epigastric pain.     Patient concerns: Feeling well overall. Reports some right hip that radiates down her buttocks and also down the front of the leg. Worsens with lifting or moving of the leg. Improves with swimming, resting. Interested in physical therapy.     Level II US  Results reviewed normal scan.  Having a boy! States daughter at home is very excited.    Objective:  Vitals:    17 1255   BP: 119/74   Weight: 106.6 kg (235 lb)   Height: 1.778 m (5' 10\")      See OB flowsheet    Assessment/Plan     Encounter Diagnosis   Name Primary?     Supervision of high risk [facility] CNM care Yes     Orders Placed This Encounter   Procedures     PHYSICAL THERAPY REFERRAL     Orders Placed This Encounter   Medications     order for DME     Sig: Maternity Belt order     Dispense:  1 each     Refill:  0     - Reviewed total weight gain, encouraged continued healthy diet and exercise.      - Reviewed why/how to contact provider.    Patient education/orders or handouts today:  PTL signs/symptoms, Fetal movement and Plan for EOB visit w labs     Reviewed level 2 ultrasound.  Discussed exercises/stretches for pt's right hip pain. Prescription for maternity belt given.  Physical therapy referral placed.  Next visit EOB.   Needs tolac consent and op report.   Continue scheduled prenatal care, RTC in approx 4 weeks for EOB and prn if questions or concerns.     JENNIFER Galeana, SYLWIA                "

## 2017-06-07 ENCOUNTER — THERAPY VISIT (OUTPATIENT)
Dept: PHYSICAL THERAPY | Facility: CLINIC | Age: 32
End: 2017-06-07
Payer: COMMERCIAL

## 2017-06-07 DIAGNOSIS — M53.3 SI (SACROILIAC) JOINT DYSFUNCTION: Primary | ICD-10-CM

## 2017-06-07 PROCEDURE — 97110 THERAPEUTIC EXERCISES: CPT | Mod: GP | Performed by: PHYSICAL THERAPIST

## 2017-06-07 PROCEDURE — 97161 PT EVAL LOW COMPLEX 20 MIN: CPT | Mod: GP | Performed by: PHYSICAL THERAPIST

## 2017-06-07 ASSESSMENT — ACTIVITIES OF DAILY LIVING (ADL)
PUTTING_ON_SOCKS_AND_SHOES: SLIGHT DIFFICULTY
HOS_ADL_ITEM_SCORE_TOTAL: 46
TWISTING/PIVOTING_ON_INVOLVED_LEG: EXTREME DIFFICULTY
GOING_UP_1_FLIGHT_OF_STAIRS: NO DIFFICULTY AT ALL
ROLLING_OVER_IN_BED: MODERATE DIFFICULTY
GETTING_INTO_AND_OUT_OF_A_BATHTUB: NO DIFFICULTY AT ALL
STEPPING_UP_AND_DOWN_CURBS: NO DIFFICULTY AT ALL
HOS_ADL_COUNT: 15
HOS_ADL_SCORE(%): 76.67
STANDING_FOR_15_MINUTES: MODERATE DIFFICULTY
WALKING_APPROXIMATELY_10_MINUTES: SLIGHT DIFFICULTY
GETTING_INTO_AND_OUT_OF_AN_AVERAGE_CAR: NO DIFFICULTY AT ALL
WALKING_INITIALLY: MODERATE DIFFICULTY
SITTING_FOR_15_MINUTES: SLIGHT DIFFICULTY
GOING_DOWN_1_FLIGHT_OF_STAIRS: NO DIFFICULTY AT ALL
HEAVY_WORK: SLIGHT DIFFICULTY
LIGHT_TO_MODERATE_WORK: MODERATE DIFFICULTY
WALKING_15_MINUTES_OR_GREATER: SLIGHT DIFFICULTY
RECREATIONAL_ACTIVITIES: NO DIFFICULTY AT ALL
HOS_ADL_HIGHEST_POTENTIAL_SCORE: 60
DEEP_SQUATTING: NO DIFFICULTY AT ALL

## 2017-06-07 NOTE — PROGRESS NOTES
Physical Therapy Initial Examination/Evaluation  June 7, 2017    Ani Aquino is a 31 year old female referred to physical therapy by JENNIFER Bruce CNM for treatment of back/pelvis pain during pregnancy with Precautions/Restrictions/MD instructions to evaluate and treat  Gestation:  24 weeks    Therapist Impression:   Ms. Aquino presents with SI dysfunction/muscle imbalance secondary to pregnancy.  SI belt was not helpful today, she was instructed in self management and therapeutic exercise.  As she had no pain this morning (although symptoms returned) she may improve readily.  She will initiate self treatment and call therapist to report on progress in a few days.      Subjective:  DOI/onset: 1-2 months DOS: None  Acute Injury or Gradual Onset?: Gradual  Mechanism of Injury: None  Related PMH: None Previous Treatment: Nothing Effect of prior treatment: NA  Imaging: None  Chief Complaint/Functional Limitations:   R sacrum to inguinal area, denies pubic symphysis pain.  Turning/pivoting, standing for 15 min.  Walking is difficult at first, can take pain away, turning in bed is painful, weight bearing or lifting LE is painful. (4/10) especially trying to lift the R.   She can sleep on sides. 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  Patient's goals are see chief complaints     Other pertinent PMH/Red Flags:  currently pregnant, overweight   Barriers at home/work: None as reported by patient  Pertinent Surgical History: Caesarean 4 years ago  Medications: Prenatal  General health as reported by patient: good  Return to MD:  Normal prenatal  Subjective:    HPI                    Objective:    System         Lumbar/SI Evaluation  ROM:  AROM Lumbar: normal (No pain reproduction)  AROM Thoracic: normal  Strength: No symptoms resisting hip flexion/hip rotation/knee extension/dorsiflexion  Lumbar Myotomes:  not assessed                Lumbar Dermtomes:  not assessed                Neural Tension/Mobility:  Lumbar:  Normal        Lumbar Palpation:  Palpation (lumbar): No significant tenderness, mild in piriformis on R.   Extremely firm muscle tone in LE's.      Functional Tests:  Core strength and proprioception lumbar: Partial squat:  Feels good except for knee pain (excess anterior knee excursion, had difficulty correcting)            SI joint/Sacrum:    Unable to test others due to pregnancy/comfort  Alignment symmetrical in pelvis.        Right positive at:    Squish  Right negative at:  Forward bend standing; Forward bend seated and [place] Ventromedial    Sacral conclusion right:  Hyper                                             General     ROS    Assessment/Plan:      Patient is a 31 year old female with lumbar, sacral and pelvic complaints.    Patient has the following significant findings with corresponding treatment plan.                Diagnosis 1:  SI instability during pregnancy    Pain -  hot/cold therapy, manual therapy, splint/taping/bracing/orthotics, self management, education and home program  Decreased strength - therapeutic exercise and therapeutic activities  Impaired gait - home program  Decreased function - therapeutic activities  Instability -  Therapeutic Activity  Therapeutic Exercise  Neuromuscular Re-education  Splinting/Taping/Bracing/Orthotic    Therapy Evaluation Codes:   1) History comprised  of:   Personal factors that impact the plan of care:      None.    Comorbidity factors that impact the plan of care are:      Current pregnancy and Overweight.     Medications impacting care: None.  2) Examination of Body Systems comprised of:   Body structures and functions that impact the plan of care:      Sacral illiac joint.   Activity limitations that impact the plan of care are:      Driving, Dressing, Squatting/kneeling, Stairs and Walking.  3) Clinical presentation characteristics are:   Stable/Uncomplicated.  4) Decision-Making    Low complexity using standardized patient assessment instrument and/or measureable assessment of functional outcome.  Cumulative Therapy Evaluation is: Low complexity.    Previous and current functional limitations:  (See Goal Flow Sheet for this information)    Short term and Long term goals: (See Goal Flow Sheet for this information)     Communication ability:  Patient appears to be able to clearly communicate and understand verbal and written communication and follow directions correctly.  Treatment Explanation - The following has been discussed with the patient:   RX ordered/plan of care  Anticipated outcomes  Possible risks and side effects  This patient would benefit from PT intervention to resume normal activities.   Rehab potential is good.    Frequency:  1 X week, once daily  Duration:  for 4 weeks or prn  Discharge Plan:  Achieve all LTG.  Independent in home treatment program.  Reach maximal therapeutic benefit.    Please refer to the daily flowsheet for treatment today, total treatment time and time spent performing 1:1 timed codes.

## 2017-06-07 NOTE — MR AVS SNAPSHOT
After Visit Summary   6/7/2017    Ani Aquino    MRN: [id number]           Patient Information     Date Of Birth          1985        Visit Information        Provider Department      6/7/2017 12:10 PM Joanne Jalloh PT [facility] [place]        Today's Diagnoses     SI (sacroiliac) joint dysfunction    -  1       Follow-ups after your visit        Your next 10 appointments already scheduled     Jul 07, 2017  4:00 PM CDT   Return Obstetrics Extended with JENNIFER Harper CNM   [facility] ([facility])    [facility] [address]  [address]  [address]  [facility] [address] [phone]              Who to contact     If you have questions or need follow up information about today's clinic visit or your schedule please contact [facility] [place] directly at [phone].  Normal or non-critical lab and imaging results will be communicated to you by [email or web address], letter or phone within 4 business days after the clinic has received the results. If you do not hear from us within 7 days, please contact the clinic through [email or web address] or phone. If you have a critical or abnormal lab result, we will notify you by phone as soon as possible.  Submit refill requests through Lore or call your pharmacy and they will forward the refill request to us. Please allow 3 business days for your refill to be completed.          Additional Information About Your Visit        MyChart Information     [employer] gives you secure access to your electronic health record. If you see a primary care provider, you can also send messages to your care team and make appointments. If you have questions, please call your primary care clinic.  If you do not have a primary care provider, please call [phone] and they will assist you.        Care EveryWhere ID     This is your Care EveryWhere ID. This could be used by  other organizations to access your [place] medical records  [id number]         Blood Pressure from Last 3 Encounters:   06/02/17 119/74   04/28/17 127/78   03/22/17 120/75    Weight from Last 3 Encounters:   06/02/17 106.6 kg (235 lb)   04/28/17 103.6 kg (228 lb 4.8 oz)   03/22/17 102.8 kg (226 lb 9.6 oz)              We Performed the Following     CHENG Inital Eval Report     PT Eval, Low Complexity (79119)     Therapeutic Exercises        Primary Care Provider    None Specified       No primary provider on file.        Thank you!     Thank you for choosing [facility] [place]  for your care. Our goal is always to provide you with excellent care. Hearing back from our patients is one way we can continue to improve our services. Please take a few minutes to complete the written survey that you may receive in the mail after your visit with us. Thank you!             Your Updated Medication List - Protect others around you: Learn how to safely use, store and throw away your medicines at www.disposemymeds.org.          This list is accurate as of: 6/7/17  6:12 PM.  Always use your most recent med list.                   Brand Name Dispense Instructions for use    order for DME     1 each    Maternity Belt order       PRENATAL DHA PO      Take 1 tablet by mouth daily

## 2017-07-07 ENCOUNTER — OFFICE VISIT (OUTPATIENT)
Dept: OBGYN | Facility: CLINIC | Age: 32
End: 2017-07-07
Attending: ADVANCED PRACTICE MIDWIFE
Payer: COMMERCIAL

## 2017-07-07 VITALS
SYSTOLIC BLOOD PRESSURE: 124 MMHG | BODY MASS INDEX: 34.79 KG/M2 | HEART RATE: 90 BPM | WEIGHT: 243 LBS | DIASTOLIC BLOOD PRESSURE: 75 MMHG | HEIGHT: 70 IN

## 2017-07-07 DIAGNOSIS — Z98.891 HISTORY OF C-SECTION: ICD-10-CM

## 2017-07-07 DIAGNOSIS — O09.93 HRP (HIGH RISK PREGNANCY), THIRD TRIMESTER: Primary | ICD-10-CM

## 2017-07-07 LAB
ERYTHROCYTE [DISTWIDTH] IN BLOOD BY AUTOMATED COUNT: 13.6 % (ref 10–15)
GLUCOSE 1H P 50 G GLC PO SERPL-MCNC: 115 MG/DL (ref 60–129)
HCT VFR BLD AUTO: 35.1 % (ref 35–47)
HGB BLD-MCNC: 11.6 G/DL (ref 11.7–15.7)
MCH RBC QN AUTO: 31.1 PG (ref 26.5–33)
MCHC RBC AUTO-ENTMCNC: 33 G/DL (ref 31.5–36.5)
MCV RBC AUTO: 94 FL (ref 78–100)
PLATELET # BLD AUTO: 226 10E9/L (ref 150–450)
RBC # BLD AUTO: 3.73 10E12/L (ref 3.8–5.2)
WBC # BLD AUTO: 10.1 10E9/L (ref 4–11)

## 2017-07-07 PROCEDURE — 82950 GLUCOSE TEST: CPT | Performed by: ADVANCED PRACTICE MIDWIFE

## 2017-07-07 PROCEDURE — 86780 TREPONEMA PALLIDUM: CPT | Performed by: ADVANCED PRACTICE MIDWIFE

## 2017-07-07 PROCEDURE — 99212 OFFICE O/P EST SF 10 MIN: CPT | Mod: ZF

## 2017-07-07 PROCEDURE — 36415 COLL VENOUS BLD VENIPUNCTURE: CPT | Performed by: ADVANCED PRACTICE MIDWIFE

## 2017-07-07 PROCEDURE — 82306 VITAMIN D 25 HYDROXY: CPT | Performed by: ADVANCED PRACTICE MIDWIFE

## 2017-07-07 PROCEDURE — 85027 COMPLETE CBC AUTOMATED: CPT | Performed by: ADVANCED PRACTICE MIDWIFE

## 2017-07-07 NOTE — MR AVS SNAPSHOT
After Visit Summary   2017    Ani Aquino    MRN: [id number]           Patient Information     Date Of Birth          1985        Visit Information        Provider Department      2017 4:00 PM Orin Mondragon APRN CNM [facility]        Today's Diagnoses     HRP (high risk pregnancy), third trimester    -  1    History of            Follow-ups after your visit        Follow-up notes from your care team     Return in about 2 weeks (around 2017) for EOB.      Your next 10 appointments already scheduled     2017 12:45 PM CDT   RETURN OB with JENNIFER Harper CNM   [facility] ([facility])    [facility] [address]  [address]  [address]  [facility] [address] [phone]              Who to contact     Please call your clinic at [phone] to:    Ask questions about your health    Make or cancel appointments    Discuss your medicines    Learn about your test results    Speak to your doctor   If you have compliments or concerns about an experience at your clinic, or if you wish to file a complaint, please contact [facility] Patient Relations at [phone] or email us at [email or web address]         Additional Information About Your Visit        MyChart Information     [employer] gives you secure access to your electronic health record. If you see a primary care provider, you can also send messages to your care team and make appointments. If you have questions, please call your primary care clinic.  If you do not have a primary care provider, please call [phone] and they will assist you.      [employer] is an electronic gateway that provides easy, online access to your medical records. With [employer], you can request a clinic appointment, read your test results, renew a prescription or communicate with your care team.     To access your existing  "account, please contact your [facility] or call [phone] for assistance.        Care EveryWhere ID     This is your Care EveryWhere ID. This could be used by other organizations to access your [place] medical records  [id number]        Your Vitals Were     Pulse Height BMI (Body Mass Index)             90 1.778 m (5' 10\") 34.87 kg/m2          Blood Pressure from Last 3 Encounters:   07/07/17 124/75   06/02/17 119/74   04/28/17 127/78    Weight from Last 3 Encounters:   07/07/17 110.2 kg (243 lb)   06/02/17 106.6 kg (235 lb)   04/28/17 103.6 kg (228 lb 4.8 oz)              We Performed the Following     25- [place]-Vitamin D     Anti Treponema     CBC with platelets     Glucose 1 Hour        Primary Care Provider    None Specified       No primary provider on file.        Equal Access to Services     CHIQUIS CALDERON : Kimberley Collier, ann marie bach, tate moses, digna virk . So [facility] [phone].    ATENCIÓN: Si habla español, tiene a wiggins disposición servicios gratuitos de asistencia lingüística. Llame al [phone].    We comply with applicable federal civil rights laws and [place] laws. We do not discriminate on the basis of race, color, national origin, age, disability sex, sexual orientation or gender identity.            Thank you!     Thank you for choosing [facility]  for your care. Our goal is always to provide you with excellent care. Hearing back from our patients is one way we can continue to improve our services. Please take a few minutes to complete the written survey that you may receive in the mail after your visit with us. Thank you!             Your Updated Medication List - Protect others around you: Learn how to safely use, store and throw away your medicines at www.disposemymeds.org.          This list is accurate as of: 7/7/17  4:24 PM.  Always use your most recent med list.          "          Brand Name Dispense Instructions for use Diagnosis    order for DME     1 each    Maternity Belt order    Hip pain, right, Supervision of high risk pregnancy in first trimester       PRENATAL DHA PO      Take 1 tablet by mouth daily    Supervision of high risk pregnancy in first trimester

## 2017-07-07 NOTE — PROGRESS NOTES
31 year old, , 28w3d,   The patient presents with the following concerns: Denies concerns today.  Continues to desire TOLAC.  Placed on HR rounds for op note review.  PHQ-9 SCORE 2017   Total Score 3 0     Education completed today includes breast feeding, [facility] hand out , contraception, counting movements, signs of pre-term labor, when to present to birthplace, post partum depression, GBS, getting enough iron and labor induction.  Birth preferences reviewed: Medicated  Labor support:   to be present   [place] Feeding plans :    Contraception planned:  vasectomy  The following labs were ordered today:       Drop down: GCT, CBC w platelets, Vitamin D and Anti-treponema,      Blood type:   ABO   Date Value Ref [place] Status   2017 O  Final     RH(D)   Date Value Ref [place] Status   2017  Pos  Final     Antibody Screen   Date Value Ref [place] Status   2017 Neg  Final     TDAP  was not given. Patient declined injection.  Recommendations reviewed.  A/P:  Encounter Diagnosis   Name Primary?     History of       - WIll need TOLAC consent after OP note reviewed at HR rounds, patient has consent.    Continue scheduled prenatal care  JENNIFER Harper CNM

## 2017-07-07 NOTE — LETTER
2017       RE: Ani Aquino  [address] [facility] [address]     Dear Colleague,    Thank you for referring your patient, Ani Aquino, to the [facility] at [facility]. Please see a copy of my visit note below.     31 year old, , 28w3d,   The patient presents with the following concerns: Denies concerns today.  Continues to desire TOLAC.  Placed on HR rounds for op note review.  PHQ-9 SCORE 2017   Total Score 3 0     Education completed today includes breast feeding, [facility] hand out , contraception, counting movements, signs of pre-term labor, when to present to birthplace, post partum depression, GBS, getting enough iron and labor induction.  Birth preferences reviewed: Medicated  Labor support:   to be present    Feeding plans :    Contraception planned:  vasectomy  The following labs were ordered today:       Drop down: GCT, CBC w platelets, Vitamin D and Anti-treponema,      Blood type:   ABO   Date Value Ref [place] Status   2017 O  Final     RH(D)   Date Value Ref [place] Status   2017  Pos  Final     Antibody Screen   Date Value Ref [place] Status   2017 Neg  Final     TDAP  was not given. Patient declined injection.  Recommendations reviewed.  A/P:  Encounter Diagnosis   Name Primary?     History of       - WIll need TOLAC consent after OP note reviewed at HR rounds, patient has consent.    Continue scheduled prenatal care  JENNIFER Harper CNM

## 2017-07-07 NOTE — NURSING NOTE
Chief Complaint   Patient presents with     Prenatal Care     EOB visit. 28 weeks and 3 days       Elle Mena, [facility] 7/7/2017

## 2017-07-08 LAB
DEPRECATED CALCIDIOL+CALCIFEROL SERPL-MC: 60 UG/L (ref 20–75)
T PALLIDUM IGG+IGM SER QL: NEGATIVE

## 2017-07-08 ASSESSMENT — PATIENT HEALTH QUESTIONNAIRE - PHQ9: SUM OF ALL RESPONSES TO PHQ QUESTIONS 1-9: 0

## 2017-07-25 ENCOUNTER — OFFICE VISIT (OUTPATIENT)
Dept: OBGYN | Facility: CLINIC | Age: 32
End: 2017-07-25
Attending: ADVANCED PRACTICE MIDWIFE
Payer: COMMERCIAL

## 2017-07-25 VITALS
BODY MASS INDEX: 35.78 KG/M2 | SYSTOLIC BLOOD PRESSURE: 125 MMHG | HEIGHT: 70 IN | DIASTOLIC BLOOD PRESSURE: 77 MMHG | WEIGHT: 249.9 LBS | HEART RATE: 89 BPM

## 2017-07-25 DIAGNOSIS — Z23 NEED FOR DIPHTHERIA-TETANUS-PERTUSSIS (TDAP) VACCINE, ADULT/ADOLESCENT: Primary | ICD-10-CM

## 2017-07-25 DIAGNOSIS — O09.91 SUPERVISION OF HIGH RISK PREGNANCY IN FIRST TRIMESTER: ICD-10-CM

## 2017-07-25 PROCEDURE — 99212 OFFICE O/P EST SF 10 MIN: CPT | Mod: ZF

## 2017-07-25 NOTE — MR AVS SNAPSHOT
After Visit Summary   7/25/2017    Ani Aquino    MRN: [id number]           Patient Information     Date Of Birth          1985        Visit Information        Provider Department      7/25/2017 4:15 PM Orin Mondragon APRN CNM [facility]        Today's Diagnoses     Need for diphtheria-tetanus-pertussis (Tdap) vaccine, adult/adolescent    -  1    Supervision of high risk [facility] care           Follow-ups after your visit        Follow-up notes from your care team     Return in about 2 weeks (around 8/8/2017) for SAKSHI.      Your next 10 appointments already scheduled     Aug 08, 2017  1:45 PM CDT   RETURN OB with JENNIFER Dunn CNM   [facility] ([facility])    [place] [facility] [address]  [address]  [address]  [facility] [address] [phone]              Who to contact     Please call your clinic at [phone] to:    Ask questions about your health    Make or cancel appointments    Discuss your medicines    Learn about your test results    Speak to your doctor   If you have compliments or concerns about an experience at your clinic, or if you wish to file a complaint, please contact [facility] Patient Relations at [phone] or email us at [email or web address]         Additional Information About Your Visit        MyChart Information     [employer] gives you secure access to your electronic health record. If you see a primary care provider, you can also send messages to your care team and make appointments. If you have questions, please call your primary care clinic.  If you do not have a primary care provider, please call [phone] and they will assist you.      [employer] is an electronic gateway that provides easy, online access to your medical records. With [employer], you can request a clinic appointment, read your test results, renew a prescription or communicate with  "your care team.     To access your existing account, please contact your [facility] or call [phone] for assistance.        Care EveryWhere ID     This is your Care EveryWhere ID. This could be used by other organizations to access your [place] medical records  [id number]        Your Vitals Were     Pulse Height BMI (Body Mass Index)             89 1.778 m (5' 10\") 35.86 kg/m2          Blood Pressure from Last 3 Encounters:   07/25/17 125/77   07/07/17 124/75   06/02/17 119/74    Weight from Last 3 Encounters:   07/25/17 113.4 kg (249 lb 14.4 oz)   07/07/17 110.2 kg (243 lb)   06/02/17 106.6 kg (235 lb)              We Performed the Following     TDAP VACCINE (BOOSTRIX)        Primary Care Provider    None Specified       No primary provider on file.        Equal Access to Services     [facility] : Hadnidhi Collier, ann marie bach, tate moses, digna virk . So [facility] [phone].    ATENCIÓN: Si habla español, tiene a wiggins disposición servicios gratuitos de asistencia lingüística. Llame al [phone].    We comply with applicable federal civil rights laws and [place] laws. We do not discriminate on the basis of race, color, national origin, age, disability sex, sexual orientation or gender identity.            Thank you!     Thank you for choosing [facility]  for your care. Our goal is always to provide you with excellent care. Hearing back from our patients is one way we can continue to improve our services. Please take a few minutes to complete the written survey that you may receive in the mail after your visit with us. Thank you!             Your Updated Medication List - Protect others around you: Learn how to safely use, store and throw away your medicines at www.disposemymeds.org.          This list is accurate as of: 7/25/17  4:40 PM.  Always use your most recent med list.                "    Brand Name Dispense Instructions for use Diagnosis    order for DME     1 each    Maternity Belt order    Hip pain, right, Supervision of high risk pregnancy in first trimester       PRENATAL DHA PO      Take 1 tablet by mouth daily    Supervision of high risk pregnancy in first trimester

## 2017-07-25 NOTE — PROGRESS NOTES
"Subjective:      31 year old  at 31w0d presentst for a routine prenatal appointment.    Denies vaginal bleeding or leakage of fluid.  Occasional contractions. Reports regular fetal movement.       No HA, visual changes, RUQ or epigastric pain.   Patient concerns:  Feeling well overall.  Has had some suprapubic discomfort.  Declines Tdap today.    Reviewed EOB labs with patient.    Objective:  Vitals:    17 1623   BP: 125/77   BP Location: Left arm   Patient Position: Chair   Cuff Size: Adult Regular   Pulse: 89   Weight: 113.4 kg (249 lb 14.4 oz)   Height: 1.778 m (5' 10\")   , see ob flowsheet  Assessment/Plan     Encounter Diagnosis   Name Primary?     Need for diphtheria-tetanus-pertussis (Tdap) vaccine, adult/adolescent Yes     Orders Placed This Encounter   Procedures     TDAP VACCINE (BOOSTRIX)     No orders of the defined types were placed in this encounter.    ABO   Date Value Ref Range Status   2017 O  Final     RH(D)   Date Value Ref Range Status   2017  Pos  Final     Antibody Screen   Date Value Ref Range Status   2017 Neg  Final       - Reviewed total weight gain, encouraged continued healthy diet and exercise.    Reviewed importance of daily fetal kick count and why/how to contact provider.    - Reviewed why/how to contact provider if headache/visual changes/RUQ or epigastric pain, decreased fetal movement, vaginal bleeding, leakage of fluid or more than 4 contractions in an hour.     Patient education/orders or handouts today:  PTL signs/symptoms      Return to clinic in 2 weeks and prn if questions or concerns.  TOLAC consent next.    JENNIFER Harper CNM    "

## 2017-07-25 NOTE — LETTER
"2017       RE: Ani Aquino  [address] [facility] [address]     Dear Colleague,    Thank you for referring your patient, Ani Aquino, to the [facility] at [facility]. Please see a copy of my visit note below.    Subjective:      31 year old  at 31w0d presentst for a routine prenatal appointment.    Denies vaginal bleeding or leakage of fluid.  Occasional contractions. Reports regular fetal movement.       No HA, visual changes, RUQ or epigastric pain.   Patient concerns:  Feeling well overall.  Has had some suprapubic discomfort.  Declines Tdap today.    Reviewed EOB labs with patient.    Objective:  Vitals:    17 1623   BP: 125/77   BP Location: Left arm   Patient Position: Chair   Cuff Size: Adult Regular   Pulse: 89   Weight: 113.4 kg (249 lb 14.4 oz)   Height: 1.778 m (5' 10\")   , see ob flowsheet  Assessment/Plan     Encounter Diagnosis   Name Primary?     Need for diphtheria-tetanus-pertussis (Tdap) vaccine, adult/adolescent Yes     Orders Placed This Encounter   Procedures     TDAP VACCINE (BOOSTRIX)     No orders of the defined types were placed in this encounter.    ABO   Date Value Ref Range Status   2017 O  Final     RH(D)   Date Value Ref Range Status   2017  Pos  Final     Antibody Screen   Date Value Ref Range Status   2017 Neg  Final       - Reviewed total weight gain, encouraged continued healthy diet and exercise.    Reviewed importance of daily fetal kick count and why/how to contact provider.    - Reviewed why/how to contact provider if headache/visual changes/RUQ or epigastric pain, decreased fetal movement, vaginal bleeding, leakage of fluid or more than 4 contractions in an hour.     Patient education/orders or handouts today:  PTL signs/symptoms    Return to clinic in 2 weeks and prn if questions or concerns.  TOLAC consent next.    JENNIFER Harper CNM      "

## 2017-08-02 ENCOUNTER — TELEPHONE (OUTPATIENT)
Dept: OBGYN | Facility: CLINIC | Age: 32
End: 2017-08-02

## 2017-08-08 ENCOUNTER — OFFICE VISIT (OUTPATIENT)
Dept: OBGYN | Facility: CLINIC | Age: 32
End: 2017-08-08
Attending: OBSTETRICS & GYNECOLOGY
Payer: COMMERCIAL

## 2017-08-08 ENCOUNTER — OFFICE VISIT (OUTPATIENT)
Dept: OBGYN | Facility: CLINIC | Age: 32
End: 2017-08-08
Attending: ADVANCED PRACTICE MIDWIFE
Payer: COMMERCIAL

## 2017-08-08 VITALS
HEIGHT: 70 IN | HEART RATE: 83 BPM | SYSTOLIC BLOOD PRESSURE: 120 MMHG | BODY MASS INDEX: 36.08 KG/M2 | WEIGHT: 252 LBS | DIASTOLIC BLOOD PRESSURE: 80 MMHG

## 2017-08-08 DIAGNOSIS — O09.91 SUPERVISION OF HIGH RISK PREGNANCY IN FIRST TRIMESTER: Primary | ICD-10-CM

## 2017-08-08 DIAGNOSIS — O36.63X0 LARGE FOR DATES AFFECTING MANAGEMENT OF MOTHER, THIRD TRIMESTER, NOT APPLICABLE OR UNSPECIFIED FETUS: ICD-10-CM

## 2017-08-08 PROCEDURE — 76816 OB US FOLLOW-UP PER FETUS: CPT | Mod: ZF

## 2017-08-08 PROCEDURE — 99212 OFFICE O/P EST SF 10 MIN: CPT | Mod: ZF

## 2017-08-08 ASSESSMENT — PAIN SCALES - GENERAL: PAINLEVEL: NO PAIN (0)

## 2017-08-08 NOTE — PROGRESS NOTES
31 year old female, , presents at 33 0/7 weeks for obstetric ultrasound assessment indicated by size greater than dates.    Single fetus    Presentation - cephalic    USEGA = 35 3/7 weeks.  EFW = 2,728 grams, 83 % for 33 weeks. AC >97%.      MAURO = 14.4cm.  FHR = 138bpm     Placenta posterior and grade 1    Comments: AGA with large AC    Findings discussed with patient.    Further studies: Consider repeat GCT.  Can repeat US in 4 weeks to assess growth again.    RANDALL Emerson MD

## 2017-08-08 NOTE — LETTER
2017       RE: Ani Aquino  [address]  [facility] [address]     Dear Colleague,    Thank you for referring your patient, Ani Aquino, to the [facility] at [facility]. Please see a copy of my visit note below.    31 year old female, , presents at 33 0/7 weeks for obstetric ultrasound assessment indicated by size greater than dates.    Single fetus    Presentation - cephalic    USEGA = 35 3/7 weeks.  EFW = 2,728 grams, 83 % for 33 weeks. AC >97%.      MAURO = 14.4cm.  FHR = 138bpm     Placenta posterior and grade 1    Comments: AGA with large AC    Findings discussed with patient.    Further studies: Consider repeat GCT.  Can repeat US in 4 weeks to assess growth again.    RANDALL Emerson MD    Again, thank you for allowing me to participate in the care of your patient.      Sincerely,    [id number]

## 2017-08-08 NOTE — LETTER
"2017       RE: Ani Aquino  [address] [facility] [address]     Dear Colleague,    Thank you for referring your patient, Ani Aquino, to the [facility] at [facility]. Please see a copy of my visit note below.    Subjective:      31 year old  at 33w0d presentst for a routine prenatal appointment. No vaginal bleeding or leakage of fluid.  BH occasional contractions. Normal daily fetal movement. No HA, visual changes, RUQ or epigastric pain.   Patient concerns:  Feeling well overall.    Reviewed TDAP Declines     Objective:  Vitals:    17 1344   BP: 120/80   Pulse: 83   Weight: 114.3 kg (252 lb)   Height: 1.778 m (5' 10\")   See ob flowsheet    Assessment/Plan     Encounter Diagnoses   Name Primary?     Supervision of high risk WHS CNM care Yes     Large for dates affecting management of mother, third trimester, not applicable or unspecified fetus      Orders Placed This Encounter   Procedures     Growth Ultrasound [address]     ABO   Date Value Ref Range Status   2017 O  Final     RH(D)   Date Value Ref Range Status   2017  Pos  Final     Antibody Screen   Date Value Ref Range Status   2017 Neg  Final       -TOLAC Consent reviewed in detail, questions answered and signed by pt.    -Measuring 39 cm at 33 weeks - sent for growth ultrasound today.      - Reviewed total weight gain, encouraged continued healthy diet and exercise. Reviewed importance of daily fetal kick count and why/how to contact provider.    - Reviewed why/how to contact provider if headache/visual changes/RUQ or epigastric pain, decreased fetal movement, vaginal bleeding, leakage of fluid or more than 4 contractions in an hour.     -Repeat GCT today per Dr. Cartagena for LGA.    -Reviewed GBS screening at 35-36 wks.    Return to clinic in 3 weeks and prn if questions or concerns.     JENNIFER Toney CNM      "

## 2017-08-08 NOTE — PROGRESS NOTES
"Subjective:      31 year old  at 33w0d presentst for a routine prenatal appointment. No vaginal bleeding or leakage of fluid.  BH occasional contractions. Normal daily fetal movement. No HA, visual changes, RUQ or epigastric pain.   Patient concerns:  Feeling well overall.    Reviewed TDAP Declines     Objective:  Vitals:    17 1344   BP: 120/80   Pulse: 83   Weight: 114.3 kg (252 lb)   Height: 1.778 m (5' 10\")   See ob flowsheet    Assessment/Plan     Encounter Diagnoses   Name Primary?     Supervision of high risk WHS CNM care Yes     Large for dates affecting management of mother, third trimester, not applicable or unspecified fetus      Orders Placed This Encounter   Procedures     Growth Ultrasound [address]     ABO   Date Value Ref Range Status   2017 O  Final     RH(D)   Date Value Ref Range Status   2017  Pos  Final     Antibody Screen   Date Value Ref Range Status   2017 Neg  Final       -TOLAC Consent reviewed in detail, questions answered and signed by pt.    -Measuring 39 cm at 33 weeks - sent for growth ultrasound today.      - Reviewed total weight gain, encouraged continued healthy diet and exercise. Reviewed importance of daily fetal kick count and why/how to contact provider.    - Reviewed why/how to contact provider if headache/visual changes/RUQ or epigastric pain, decreased fetal movement, vaginal bleeding, leakage of fluid or more than 4 contractions in an hour.     -Repeat GCT today per Dr. Cartagena for LGA.    -Reviewed GBS screening at 35-36 wks.    Return to clinic in 3 weeks and prn if questions or concerns.     JENNIFER ToneyM      "

## 2017-08-08 NOTE — MR AVS SNAPSHOT
After Visit Summary   8/8/2017    Ani Aquino    MRN: [id number]           Patient Information     Date Of Birth          1985        Visit Information        Provider Department      8/8/2017 1:45 PM Luna Ramírez APRN [facility]        Today's Diagnoses     Supervision of high risk WHS NIOCLE care    -  1    Large for dates affecting management of mother, third trimester, not applicable or unspecified fetus           Follow-ups after your visit        Follow-up notes from your care team     Return in about 3 weeks (around 8/29/2017) for Return OB.      Your next 10 appointments already scheduled     Aug 29, 2017  2:45 PM CDT   RETURN OB with Suman Ramachandran CNM   [facility] ([facility])    [place] [facility] [address]  [address]  [address]  [facility] [id number] [phone]              Who to contact     Please call your clinic at [phone] to:    Ask questions about your health    Make or cancel appointments    Discuss your medicines    Learn about your test results    Speak to your doctor   If you have compliments or concerns about an experience at your clinic, or if you wish to file a complaint, please contact [facility] Patient Relations at [phone] or email us at [email or web address]         Additional Information About Your Visit        MyChart Information     [employer] gives you secure access to your electronic health record. If you see a primary care provider, you can also send messages to your care team and make appointments. If you have questions, please call your primary care clinic.  If you do not have a primary care provider, please call [phone] and they will assist you.      [employer] is an electronic gateway that provides easy, online access to your medical records. With [employer], you can request a clinic appointment, read your test  "results, renew a prescription or communicate with your care team.     To access your existing account, please contact your [facility] or call [phone] for assistance.        Care EveryWhere ID     This is your Care EveryWhere ID. This could be used by other organizations to access your [place] medical records  [id number]        Your Vitals Were     Pulse Height Breastfeeding? BMI (Body Mass Index)          83 1.778 m (5' 10\") No 36.16 kg/m2         Blood Pressure from Last 3 Encounters:   08/08/17 120/80   07/25/17 125/77   07/07/17 124/75    Weight from Last 3 Encounters:   08/08/17 114.3 kg (252 lb)   07/25/17 113.4 kg (249 lb 14.4 oz)   07/07/17 110.2 kg (243 lb)               Primary Care Provider    None Specified       No primary provider on file.        Equal Access to Services     [facility] : Kimberley Collier, ann marie bach, tate moses, digna virk . So [facility] [phone].    ATENCIÓN: Si habla español, tiene a wiggins disposición servicios gratuitos de asistencia lingüística. Llame al [phone].    We comply with applicable federal civil rights laws and [place] laws. We do not discriminate on the basis of race, color, national origin, age, disability sex, sexual orientation or gender identity.            Thank you!     Thank you for choosing [facility]  for your care. Our goal is always to provide you with excellent care. Hearing back from our patients is one way we can continue to improve our services. Please take a few minutes to complete the written survey that you may receive in the mail after your visit with us. Thank you!             Your Updated Medication List - Protect others around you: Learn how to safely use, store and throw away your medicines at www.disposemymeds.org.          This list is accurate as of: 8/8/17 11:59 PM.  Always use your most recent med list.                "    Brand Name Dispense Instructions for use Diagnosis    order for DME     1 each    Maternity Belt order    Hip pain, right, Supervision of high risk pregnancy in first trimester       PRENATAL DHA PO      Take 1 tablet by mouth daily    Supervision of high risk pregnancy in first trimester

## 2017-08-08 NOTE — LETTER
2017      RE: Ani Aquino  [address] [address]       31 year old female, , presents at 33 0/7 weeks for obstetric ultrasound assessment indicated by size greater than dates.    Single fetus    Presentation - cephalic    USEGA = 35 3/7 weeks.  EFW = 2,728 grams, 83 % for 33 weeks. AC >97%.      MAURO = 14.4cm.  FHR = 138bpm     Placenta posterior and grade 1    Comments: AGA with large AC    Findings discussed with patient.    Further studies: Consider repeat GCT.  Can repeat US in 4 weeks to assess growth again.    RANDALL Emerson MD    [id number]

## 2017-08-08 NOTE — MR AVS SNAPSHOT
After Visit Summary   8/8/2017    Ani Aquino    MRN: [id number]           Patient Information     Date Of Birth          1985        Visit Information        Provider Department      8/8/2017 2:30 PM [facility] ULTRASOUND II [facility]        Today's Diagnoses     Large for dates affecting management of mother, third trimester, not applicable or unspecified fetus           Follow-ups after your visit        Your next 10 appointments already scheduled     Aug 29, 2017  2:45 PM CDT   RETURN OB with Suman Ramachandran CNM   [facility] ([facility])    [place] [facility] [address]  [address]  [address]  [facility] [id number] [phone]              Who to contact     Please call your clinic at [phone] to:    Ask questions about your health    Make or cancel appointments    Discuss your medicines    Learn about your test results    Speak to your doctor   If you have compliments or concerns about an experience at your clinic, or if you wish to file a complaint, please contact [facility] Patient Relations at [phone] or email us at [email or web address]         Additional Information About Your Visit        MyChart Information     [employer] gives you secure access to your electronic health record. If you see a primary care provider, you can also send messages to your care team and make appointments. If you have questions, please call your primary care clinic.  If you do not have a primary care provider, please call [phone] and they will assist you.      [employer] is an electronic gateway that provides easy, online access to your medical records. With [employer], you can request a clinic appointment, read your test results, renew a prescription or communicate with your care team.     To access your existing account, please contact your [facility]  or call [phone] for assistance.        Care EveryWhere ID     This is your Care EveryWhere ID. This could be used by other organizations to access your [place] medical records  [id number]         Blood Pressure from Last 3 Encounters:   08/08/17 120/80   07/25/17 125/77   07/07/17 124/75    Weight from Last 3 Encounters:   08/08/17 114.3 kg (252 lb)   07/25/17 113.4 kg (249 lb 14.4 oz)   07/07/17 110.2 kg (243 lb)              We Performed the Following     Growth Ultrasound [address]        Primary Care Provider    None Specified       No primary provider on file.        Equal Access to Services     [facility] : Hadii nathaly Collier, ann marie bach, tate moses, digna virk . So [facility] [phone].    ATENCIÓN: Si habla español, tiene a wiggins disposición servicios gratuitos de asistencia lingüística. Llame al [phone].    We comply with applicable federal civil rights laws and [place] laws. We do not discriminate on the basis of race, color, national origin, age, disability sex, sexual orientation or gender identity.            Thank you!     Thank you for choosing [facility]  for your care. Our goal is always to provide you with excellent care. Hearing back from our patients is one way we can continue to improve our services. Please take a few minutes to complete the written survey that you may receive in the mail after your visit with us. Thank you!             Your Updated Medication List - Protect others around you: Learn how to safely use, store and throw away your medicines at www.disposemymeds.org.          This list is accurate as of: 8/8/17  3:12 PM.  Always use your most recent med list.                   Brand Name Dispense Instructions for use Diagnosis    order for DME     1 each    Maternity Belt order    Hip pain, right, Supervision of high risk pregnancy in first trimester       PRENATAL DHA PO      Take 1 tablet by  mouth daily    Supervision of high risk pregnancy in first trimester

## 2017-08-09 ENCOUNTER — TELEPHONE (OUTPATIENT)
Dept: OBGYN | Facility: CLINIC | Age: 32
End: 2017-08-09

## 2017-08-09 NOTE — TELEPHONE ENCOUNTER
----- Message from JENNIFER Dunn CNM sent at 8/8/2017  5:07 PM CDT -----  We would like this pt to repeat the 1 hour GCT per Dr. Cartagena's recommendation as her baby is LGA at 33 weeks. I ordered the lab and left her a voicemail to call us back. Could you please follow up with her if she doesn't call back tomorrow and instruct her to go to the outpatient lab as soon as she is able to complete the GCT?    Thanks so much!  Luna

## 2017-08-10 DIAGNOSIS — O09.91 SUPERVISION OF HIGH RISK PREGNANCY IN FIRST TRIMESTER: ICD-10-CM

## 2017-08-10 LAB — GLUCOSE 1H P 50 G GLC PO SERPL-MCNC: 127 MG/DL (ref 60–129)

## 2017-08-10 PROCEDURE — 82950 GLUCOSE TEST: CPT | Performed by: ADVANCED PRACTICE MIDWIFE

## 2017-08-10 PROCEDURE — 36415 COLL VENOUS BLD VENIPUNCTURE: CPT | Performed by: ADVANCED PRACTICE MIDWIFE

## 2017-08-16 ENCOUNTER — TELEPHONE (OUTPATIENT)
Dept: OBGYN | Facility: CLINIC | Age: 32
End: 2017-08-16

## 2017-08-16 NOTE — TELEPHONE ENCOUNTER
Phone call - patient had two episodes of brown spotting- first time tiny amount- second a rosa maria size amount-  Denies pain,c ramping or bright red bleeding- baby moving kin the normal limits-  Offered appointment today and tomorrow-  Will come into clinic tomorrow-  If call if she has any concerns-   labor and bleeding precautions given.

## 2017-08-17 ENCOUNTER — OFFICE VISIT (OUTPATIENT)
Dept: OBGYN | Facility: CLINIC | Age: 32
End: 2017-08-17
Attending: ADVANCED PRACTICE MIDWIFE
Payer: COMMERCIAL

## 2017-08-17 VITALS — DIASTOLIC BLOOD PRESSURE: 76 MMHG | BODY MASS INDEX: 36.16 KG/M2 | WEIGHT: 252 LBS | SYSTOLIC BLOOD PRESSURE: 115 MMHG

## 2017-08-17 DIAGNOSIS — Z28.39 RUBELLA NON-IMMUNE STATUS, ANTEPARTUM: ICD-10-CM

## 2017-08-17 DIAGNOSIS — Z98.891 HISTORY OF C-SECTION: ICD-10-CM

## 2017-08-17 DIAGNOSIS — O36.63X0 LARGE FOR DATES AFFECTING MANAGEMENT OF MOTHER, THIRD TRIMESTER, NOT APPLICABLE OR UNSPECIFIED FETUS: ICD-10-CM

## 2017-08-17 DIAGNOSIS — O09.91 SUPERVISION OF HIGH RISK PREGNANCY IN FIRST TRIMESTER: Primary | ICD-10-CM

## 2017-08-17 DIAGNOSIS — O09.899 RUBELLA NON-IMMUNE STATUS, ANTEPARTUM: ICD-10-CM

## 2017-08-17 PROCEDURE — 99211 OFF/OP EST MAY X REQ PHY/QHP: CPT | Mod: ZF

## 2017-08-17 ASSESSMENT — PAIN SCALES - GENERAL: PAINLEVEL: MILD PAIN (2)

## 2017-08-17 NOTE — MR AVS SNAPSHOT
After Visit Summary   2017    Ani Aquino    MRN: [id number]           Patient Information     Date Of Birth          1985        Visit Information        Provider Department      2017 12:45 PM Ceci Juarez APRN [facility]        Today's Diagnoses     Supervision of high risk [facility] care    -  1    Large for dates affecting management of mother, third trimester, not applicable or unspecified fetus        History of         Rubella non-immune status, antepartum           Follow-ups after your visit        Follow-up notes from your care team     Return in about 12 days (around 2017).      Your next 10 appointments already scheduled     Aug 29, 2017  2:00 PM CDT   ULTRASOUND with [facility] ULTRASOUND   [facility] ([facility])    [facility] [address]  [address]  [facility] [id number] [phone]            Aug 29, 2017  2:45 PM CDT   RETURN OB with Suman Ramachandran CNM   [facility] ([facility])    [facility] [address]  [address]  [facility] [phone]              Future tests that were ordered for you today     Open Future Orders        Priority Expected Expires Ordered    Growth Ultrasound 89543 Routine 2017 12/15/2017 2017            Who to contact     Please call your clinic at [phone] to:    Ask questions about your health    Make or cancel appointments    Discuss your medicines    Learn about your test results    Speak to your doctor   If you have compliments or concerns about an experience at your clinic, or if you wish to file a complaint, please contact [facility] Patient Relations at [phone] or email us at [email or web address]         Additional Information About Your Visit        MyChart Information      Favor gives you secure access to your electronic health record. If you see a primary care provider, you can also send messages to your care team and make appointments. If you have questions, please call your primary care clinic.  If you do not have a primary care provider, please call [phone] and they will assist you.      Favor is an electronic gateway that provides easy, online access to your medical records. With Favor, you can request a clinic appointment, read your test results, renew a prescription or communicate with your care team.     To access your existing account, please contact your [facility] or call [phone] for assistance.        Care EveryWhere ID     This is your Care EveryWhere ID. This could be used by other organizations to access your [place] medical records  [id number]        Your Vitals Were     BMI (Body Mass Index)                   36.16 kg/m2            Blood Pressure from Last 3 Encounters:   08/17/17 115/76   08/08/17 120/80   07/25/17 125/77    Weight from Last 3 Encounters:   08/17/17 114.3 kg (252 lb)   08/08/17 114.3 kg (252 lb)   07/25/17 113.4 kg (249 lb 14.4 oz)               Primary Care Provider    None Specified       No primary provider on file.        Equal Access to Services     CHIQUIS CALDERON : Hadii nathaly butto Sohumairaali, waaxda luqadaha, qaybta kaalmada adeegyada, digna comer. So [facility] [phone].    ATENCIÓN: Si habla español, tiene a wiggins disposición servicios gratuitos de asistencia lingüística. Llame al [phone].    We comply with applicable federal civil rights laws and [place] laws. We do not discriminate on the basis of race, color, national origin, age, disability sex, sexual orientation or gender identity.            Thank you!     Thank you for choosing [facility]  for your care. Our goal is always to provide you with excellent care. Hearing back from our  patients is one way we can continue to improve our services. Please take a few minutes to complete the written survey that you may receive in the mail after your visit with us. Thank you!             Your Updated Medication List - Protect others around you: Learn how to safely use, store and throw away your medicines at www.disposemymeds.org.          This list is accurate as of: 8/17/17  2:39 PM.  Always use your most recent med list.                   Brand Name Dispense Instructions for use Diagnosis    order for DME     1 each    Maternity Belt order    Hip pain, right, Supervision of high risk pregnancy in first trimester       PRENATAL DHA PO      Take 1 tablet by mouth daily    Supervision of high risk pregnancy in first trimester

## 2017-08-17 NOTE — NURSING NOTE
Chief Complaint   Patient presents with     Prenatal Care   had two episodes of brown spotting- would like an exam  No spotting today but slight cramping

## 2017-08-17 NOTE — PROGRESS NOTES
Planning to schedule tour of hosp, hopefully this weekend. F/u growth u/s needs to be scheduled-will do   Subjective:      31 year old  at 34w2d presentst for a routine prenatal appointment.    no vaginal bleeding or leakage of fluid.  rare contractions. pos fetal movement.       No HA, visual changes, RUQ or epigastric pain.   Patient concerns:    Feeling well overall.  Reviewed EOB labs with patient.  Reviewed TDAP Declines  Objective:  Vitals:    17 1249   BP: 115/76   Weight: 114.3 kg (252 lb)   , see ob flowsheet  Assessment/Plan     Encounter Diagnoses   Name Primary?     Large for dates affecting management of mother, third trimester, not applicable or unspecified fetus      Supervision of high risk [facility] care Yes     History of       Rubella non-immune status, antepartum      Orders Placed This Encounter   Procedures     Growth Ultrasound 96979     No orders of the defined types were placed in this encounter.    ABO   Date Value Ref Range Status   2017 O  Final     RH(D)   Date Value Ref Range Status   2017  Pos  Final     Antibody Screen   Date Value Ref Range Status   2017 Neg  Final   , Rhogam  was notgiven.    - Reviewed total weight gain, encouraged continued healthy diet and exercise.  .  Reviewed importance of daily fetal kick count and why/how to contact provider.    - Reviewed why/how to contact provider if headache/visual changes/RUQ or epigastric pain, decreased fetal movement, vaginal bleeding, leakage of fluid or more than 4 contractions in an hour.     Patient education/orders or handouts today:  PTL signs/symptoms, TDAP, Hospital tour and follow up growth us  Reviewed GBS screening at 35-36 wks.    Return to clinic in 2 weeks and prn if questions or concerns.     Ceci Juarez, JENNIFER CNM

## 2017-08-17 NOTE — LETTER
2017       RE: Ani Aquino  [address] [facility] [address]     Dear Colleague,    Thank you for referring your patient, Ani Aquino, to the [facility] at [facility]. Please see a copy of my visit note below.    Planning to schedule tour of hosp, hopefully this weekend. F/u growth u/s needs to be scheduled-will do   Subjective:      31 year old  at 34w2d presentst for a routine prenatal appointment.    no vaginal bleeding or leakage of fluid.  rare contractions. pos fetal movement.       No HA, visual changes, RUQ or epigastric pain.   Patient concerns:    Feeling well overall.  Reviewed EOB labs with patient.  Reviewed TDAP Declines  Objective:  Vitals:    17 1249   BP: 115/76   Weight: 114.3 kg (252 lb)   , see ob flowsheet  Assessment/Plan     Encounter Diagnoses   Name Primary?     Large for dates affecting management of mother, third trimester, not applicable or unspecified fetus      Supervision of high risk [facility] CNM care Yes     History of       Rubella non-immune status, antepartum      Orders Placed This Encounter   Procedures     Growth Ultrasound 08828     No orders of the defined types were placed in this encounter.    ABO   Date Value Ref Range Status   2017 O  Final     RH(D)   Date Value Ref Range Status   2017  Pos  Final     Antibody Screen   Date Value Ref Range Status   2017 Neg  Final   , Rhogam  was notgiven.    - Reviewed total weight gain, encouraged continued healthy diet and exercise.  .  Reviewed importance of daily fetal kick count and why/how to contact provider.    - Reviewed why/how to contact provider if headache/visual changes/RUQ or epigastric pain, decreased fetal movement, vaginal bleeding, leakage of fluid or more than 4 contractions in an hour.     Patient education/orders or handouts today:  PTL signs/symptoms, TDAP, Hospital tour and follow up growth  us  Reviewed GBS screening at 35-36 wks.    Return to clinic in 2 weeks and prn if questions or concerns.     Ceci Juarez, APRN CNM

## 2017-08-19 ENCOUNTER — TELEPHONE (OUTPATIENT)
Dept: OBGYN | Facility: CLINIC | Age: 32
End: 2017-08-19

## 2017-08-19 NOTE — TELEPHONE ENCOUNTER
Pt paged to report a quarter-sized piece of mucous in the toilet that was streaked with red blood this morning. Denies leaking of fluid, recent intercourse, regular contractions/cramping. Had brief mild cramping before passing the mucous. Denies HA, scotoma, RUQ pain, nausea and vomiting. She thinks baby movement has been normal but hard to discern overnight. Will do a movement count now.     We discussed that isolated loss of mucous streaked with blood is not concerning. Pt will page back/present to triage immediately if she has art vaginal bleeding, decreased fetal movement, leaking of fluid or regular contractions/cramping, or if she develops any other new symptoms. Pt verbalizes understanding and agreement with this plan.     JENNIFER Toney

## 2017-08-21 ENCOUNTER — HOSPITAL ENCOUNTER (OUTPATIENT)
Facility: CLINIC | Age: 32
Discharge: HOME | End: 2017-08-21
Attending: ADVANCED PRACTICE MIDWIFE | Admitting: ADVANCED PRACTICE MIDWIFE
Payer: COMMERCIAL

## 2017-08-21 ENCOUNTER — TELEPHONE (OUTPATIENT)
Dept: OBGYN | Facility: CLINIC | Age: 32
End: 2017-08-21

## 2017-08-21 VITALS
SYSTOLIC BLOOD PRESSURE: 133 MMHG | WEIGHT: 252 LBS | DIASTOLIC BLOOD PRESSURE: 71 MMHG | TEMPERATURE: 98.4 F | BODY MASS INDEX: 35.28 KG/M2 | HEIGHT: 71 IN | RESPIRATION RATE: 16 BRPM | HEART RATE: 95 BPM

## 2017-08-21 PROBLEM — O60.00 PRETERM LABOR: Status: ACTIVE | Noted: 2017-08-21

## 2017-08-21 LAB
ALBUMIN UR-MCNC: NEGATIVE MG/DL
APPEARANCE UR: CLEAR
APTT PPP: 27 SEC (ref 22–37)
BILIRUB UR QL STRIP: NEGATIVE
COLOR UR AUTO: YELLOW
FIBRINOGEN PPP-MCNC: 550 MG/DL (ref 200–420)
GLUCOSE UR STRIP-MCNC: NEGATIVE MG/DL
HGB F BLD QL KLEIH BETKE: NORMAL
HGB F MFR BLD KLEIH BETKE: NORMAL %
HGB UR QL STRIP: ABNORMAL
INR PPP: 0.98 (ref 0.86–1.14)
KETONES UR STRIP-MCNC: NEGATIVE MG/DL
LEUKOCYTE ESTERASE UR QL STRIP: ABNORMAL
MUCOUS THREADS #/AREA URNS LPF: PRESENT /LPF
NITRATE UR QL: NEGATIVE
PH UR STRIP: 7 PH (ref 5–7)
RBC #/AREA URNS AUTO: 13 /HPF (ref 0–2)
SOURCE: ABNORMAL
SP GR UR STRIP: 1.01 (ref 1–1.03)
SPECIMEN SOURCE: NORMAL
SQUAMOUS #/AREA URNS AUTO: 2 /HPF (ref 0–1)
UROBILINOGEN UR STRIP-MCNC: NORMAL MG/DL (ref 0–2)
WBC #/AREA URNS AUTO: 8 /HPF (ref 0–2)
WET PREP SPEC: NORMAL

## 2017-08-21 PROCEDURE — 85610 PROTHROMBIN TIME: CPT | Performed by: ADVANCED PRACTICE MIDWIFE

## 2017-08-21 PROCEDURE — 85730 THROMBOPLASTIN TIME PARTIAL: CPT | Performed by: ADVANCED PRACTICE MIDWIFE

## 2017-08-21 PROCEDURE — 81001 URINALYSIS AUTO W/SCOPE: CPT | Performed by: ADVANCED PRACTICE MIDWIFE

## 2017-08-21 PROCEDURE — 87591 N.GONORRHOEAE DNA AMP PROB: CPT | Performed by: ADVANCED PRACTICE MIDWIFE

## 2017-08-21 PROCEDURE — 85460 HEMOGLOBIN FETAL: CPT | Performed by: ADVANCED PRACTICE MIDWIFE

## 2017-08-21 PROCEDURE — 87491 CHLMYD TRACH DNA AMP PROBE: CPT | Performed by: ADVANCED PRACTICE MIDWIFE

## 2017-08-21 PROCEDURE — 36415 COLL VENOUS BLD VENIPUNCTURE: CPT | Performed by: ADVANCED PRACTICE MIDWIFE

## 2017-08-21 PROCEDURE — 85384 FIBRINOGEN ACTIVITY: CPT | Performed by: ADVANCED PRACTICE MIDWIFE

## 2017-08-21 PROCEDURE — 87210 SMEAR WET MOUNT SALINE/INK: CPT | Performed by: ADVANCED PRACTICE MIDWIFE

## 2017-08-21 PROCEDURE — 87086 URINE CULTURE/COLONY COUNT: CPT | Performed by: ADVANCED PRACTICE MIDWIFE

## 2017-08-21 RX ORDER — ONDANSETRON 2 MG/ML
4 INJECTION INTRAMUSCULAR; INTRAVENOUS EVERY 6 HOURS PRN
Status: DISCONTINUED | OUTPATIENT
Start: 2017-08-21 | End: 2017-08-21 | Stop reason: HOSPADM

## 2017-08-21 NOTE — TELEPHONE ENCOUNTER
----- Message from JENNIFER Ritter CNM sent at 8/21/2017  1:18 PM CDT -----  Hi ladies! This patient was seen in triage this morning for irregular contractions and light spotting. She was discharged home, but I would like her to be seen in clinic later this week if possible.  Could you give her a call to get her re-scheduled? Her ultrasound can also be rescheduled from 8/29 to the following week (4 weeks from her initial ultraound on 8/8).  Thanks!     -AK

## 2017-08-21 NOTE — LETTER
August 21, 2017        Ani Aquino  [address] [facility] [address]    To Whom it May Concern:    Ani Aquino was seen on Labor & Delivery 8/21/2017 and was given the following instructions to take frequent breaks for rest and hydration as needed. Patient may return to work on 8/22/2017.    Sincerely,        JENNIFER Castillo, SYLWIA

## 2017-08-21 NOTE — PROGRESS NOTES
Data: Patient presented to [facility] at 0616.   Reason for maternal/fetal assessment per patient is Vaginal Bleeding  .  Patient is a . Prenatal record reviewed.      Obstetric History       T1      L1     SAB0   TAB0   Ectopic0   Multiple0   Live Births1       # Outcome Date GA Lbr Wilbur/2nd Weight Sex Delivery Anes PTL Lv   2 Current            1 Term 13 37w0d  3.345 kg (7 lb 6 oz) F CS-LTranv  N LIVE BIRTH      Name: Megan      . Medical history:   Past Medical History:   Diagnosis Date     Abnormal Pap smear      Irregular heart beat 2016    has had stress test, echo and holtor monitoring.     . Gestational Age 34w6d. VSS. Fetal movement present. Patient denies backache, pelvic pressure, UTI symptoms, GI problems, edema, headache, visual disturbances, epigastric or URQ pain, rupture of membranes. Support person is present.  Action: Verbal consent for EFM. Triage assessment completed. Fetal assessment: Presumed adequate fetal oxygenation documented (see flow record).   Response: King EWING CNM informed of arrival. Plan per provider is send swabs and urine. Patient verbalized agreement with plan.

## 2017-08-21 NOTE — TELEPHONE ENCOUNTER
Left vm for Ani to call back,she needs to be seen in clinic this week and US originally scheduled for 8/29 needs to be moved to the following week.  I will also send to

## 2017-08-21 NOTE — H&P
HOSPITAL TRIAGE NOTE  ===================    CHIEF COMPLAINT  ========================  Ani Aquino is a 31 year old patient presenting today at 34w6d for evaluation of vaginal bleeding and contractions.    No LMP recorded. Patient is pregnant.  Estimated Date of Delivery: Sep 26, 2017     HPI  ==================   Pt report mucous-like, pink-tinged clump of discharge on Saturday morning with mild cramping during the day.  Has continued to noticed light red, pink bleeding when wiping since.  Has noticed increase in more intense contractions this AM. Reports good fetal movement. Denies leaking of fluid.  Has noticed change in discharge since Tuesday, increase in tannish-yellow discharge and increased frequency of urination. Denies itching, odor, or irritation. Denies intercourse in past couple days. Denies any falls or abdominal trauma.    Prenatal record and labs reviewed [facility], through [employer] EMR.    CONTRACTIONS: irreg, mild and cramping  ABDOMINAL PAIN: none  FETAL MOVEMENT: active    VAGINAL BLEEDING: small, pink and red  RUPTURE OF MEMBRANES: no  PELVIC PAIN: none    PREGNANCY COMPLICATIONS: hx of   OTHER: n/a    REVIEW OF SYSTEMS  =====================  C: NEGATIVE for fever, chills  I: NEGATIVE for worrisome rashes, moles or lesions  E: NEGATIVE for vision changes or irritation  R: NEGATIVE for significant cough or SOB  CV: NEGATIVE for chest pain, palpitations or varicosities  GI: NEGATIVE for nausea, abdominal pain, heartburn, or change in bowel habits  : positive for frequency, negative for dysuria or hematuria  M: NEGATIVE for significant arthralgias or myalgia  N: NEGATIVE for headache, weakness, dizziness or paresthesias  P: NEGATIVE for changes in mood or affect    PROBLEM LIST  ===============  Patient Active Problem List    Diagnosis Date Noted      labor 2017     Priority: Medium     Large for dates affecting management of mother, third  "trimester, not applicable or unspecified fetus 2017     Priority: Medium     17: Measuring S>D. Growth ultrasound: growth 83rd percentile, AC > 97th percentile , MAURO 14.4  -Repeat GCT per Dr. Cartagena - ordered  [ 127  ]  -Repeat growth in 4 weeks [   ]       SI (sacroiliac) joint dysfunction 2017     Priority: Medium     Hip pain, right 2017     Priority: Medium     17: PT referral placed, maternity belt prescription given       Rubella non-immune status, antepartum 2017     Priority: Medium     17: Equivocal with NOB labs, repeat with 3rd trimester labs, offer vaccine postpartum if non-immune.        Vitamin D deficiency 02/10/2017     Priority: Medium     17: Vit D = 28, encouraged supplementation  17- vit D-60.  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 Scanned in media.   17: TOLAC Consent Signed and scanned.       History of  2017     Priority: Medium     Hx of  in  due to fetal intolerance, HILLARY for op report sent  Desires TOLAC    17: EOB next visit- needs tolac consent   Need OP report  17: OP note reviewed.  Please review at HR rounds: uterine incision extended laterally on both sides, double layer closure.  OK for TOLAC?   17: Reviewed Op report with Dr. Boyle at HR Rounds. \"uterine incision extended laterally on both sides\" is a description of the routine way that hysterotomy is done.  Hx also reviewed. Okay for TOLAC.   17: TOLAC consent signed and scanned.        Irregular heart beat 2016     Priority: Medium     has had stress test, echo and holtor monitoring.         HISTORIES  ==============  ALLERGIES:    No Known " Allergies  PAST MEDICAL HISTORY  Past Medical History:   Diagnosis Date     Abnormal Pap smear 2009     Irregular heart beat 2016    has had stress test, echo and holtor monitoring.       SOCIAL HISTORY  Social History     Social History     Marital status:      Spouse name: Moody     Number of children: N/A     Years of education: N/A     Occupational History     teacher [employer]     Social History Main Topics     Smoking status: Never Smoker     Smokeless tobacco: Never Used     Alcohol use No     Drug use: No     Sexual activity: Yes     Partners: Male     Other Topics Concern      Service No     Blood Transfusions No     Caffeine Concern Yes     1 soda     Occupational Exposure No     Hobby Hazards No     Sleep Concern No     Stress Concern No     Weight Concern No     Special Diet No     Back Care No     Exercise Yes     every day 30 mintues     Bike Helmet Yes     Seat Belt Yes     Self-Exams No     Social History Narrative    How much exercise per week? 30 min a day    How much calcium per day? In prenatals  And dairy       How much caffeine per day? 1 can soda    How much vitamin D per day? pernatals    Do you/your family wear seatbelts?  No    Do you/your family use safety helmets? Yes    Do you/your family use sunscreen? Yes    Do you/your family keep firearms in the home? No    Do you/your family have a smoke detector(s)? Yes             FAMILY HISTORY  Family History   Problem Relation Age of Onset     DIABETES Maternal Aunt      DIABETES Maternal Uncle      Coronary Artery Disease Paternal Grandmother      Other Cancer Paternal Grandmother      Coronary Artery Disease Paternal Grandfather      Hypertension Father      Breast Cancer Mother      Depression Maternal Grandfather      Other Cancer Maternal Grandfather      Asthma Maternal Grandfather      OSTEOPOROSIS Maternal Grandfather      OB HISTORY  Obstetric History       T1      L1     SAB0   TAB0   Ectopic0   Multiple0    "Live Births1       # Outcome Date GA Lbr Wilbur/2nd Weight Sex Delivery Anes PTL Lv   2 Current            1 Term 07/04/13 37w0d  3.345 kg (7 lb 6 oz) F CS-LTranv  N LIVE BIRTH      Name: Megan        Prenatal Labs:   Lab Results   Component Value Date    ABO O 02/08/2017    RH  Pos 02/08/2017    AS Neg 02/08/2017    HEPBANG Nonreactive 02/08/2017    TREPAB Negative 07/07/2017    RUQIGG 9 02/08/2017    HGB 11.6 (L) 07/07/2017     Rubella- immune  ULTRASOUND(s) reviewed: wnl    EXAM  ============  /68  Pulse 95  Temp 98.1  F (36.7  C) (Oral)  Resp 16  Ht 1.803 m (5' 11\")  Wt 114.3 kg (252 lb)  BMI 35.15 kg/m2  GENERAL APPEARANCE: healthy, alert and no distress  RESP: normal respiratory effort  BREAST: normal without masses, tenderness or nipple discharge and no palpable axillary masses or adenopathy  CV: regular rates and rhythm  ABDOMEN:  soft, nontender, no epigastric pain  SKIN: no suspicious lesions or rashes  NEURO: Denies headache, blurred vision, other vision changes  PSYCH: mentation appears normal. and affect normal/bright  MS/ LEGS: No clonus bilaterally and trace edema    CONTRACTIONS: irreg, mild and every 2-8 minutes   FETAL HEART TONES: continuous EFM- baseline 135 with moderate variability and positive accelerations. No decelerations.  NST: REACTIVE  EFW: 7 lbs    PELVIC EXAM: deferred  BACON SCORE: n/a  PRESENTATION: VERTEX  BLOOD: no  DISCHARGE: watery, tan discharge noted from cervix    ROM: no  AMNISURE: not done    LABS: UA, Wet prep, CBC with platelets, fetal KB, PT/INR, fibrinogen and GC/ Chlamydia  Lab results reviewed- pending    DIAGNOSIS  ============  34w6d seen on the [facility] Triage for vaginal bleeding  NST: REACTIVE  Fetal Heart rate tracing:category one    PLAN  ============  Continue observation until labs results reviewed  Consult with Dr. Harrison     Fetal Non-Stress Test Results    NST Ordered By: JENNIFER Otero CNM         NST Start & Stop Times     NST " Results  Fetus A   Baseline Rate: 135  Accelerations: Present  Decelerations: None  Interpretation: reactive

## 2017-08-21 NOTE — IP AVS SNAPSHOT
MRN:[id number]                      After Visit Summary   8/21/2017    Ani Aquino    MRN: [id number]           Thank you!     Thank you for choosing [facility] for your care. Our goal is always to provide you with excellent care. Hearing back from our patients is one way we can continue to improve our services. Please take a few minutes to complete the written survey that you may receive in the mail after you visit with us. Thank you!        Patient Information     Date Of Birth          1985        Designated Caregiver       Most Recent Value    Caregiver    Will someone help with your care after discharge? no      About your hospital stay     You were admitted on:  August 21, 2017 You last received care in the:  [facility]    You were discharged on:  August 21, 2017       Who to Call     For medical emergencies, please call 911.  For non-urgent questions about your medical care, please call your primary care provider or clinic, None          Attending Provider     Provider Specialty    Orin Mondragon APRN [facility] King, Andrea N, APRN [facility]       Primary Care Provider    Physician No Ref-Primary      Your next 10 appointments already scheduled     Aug 29, 2017  2:00 PM CDT   ULTRASOUND with [facility] ULTRASOUND   [facility] ([facility])    [facility] [address]  [address]  [facility] [address] [phone]            Aug 29, 2017  2:45 PM CDT   RETURN OB with Suman Ramachandran CNM   [facility] ([facility])    [facility] [address]  [address]  [facility] [address] [phone]              Further instructions from your care team       Discharge Instruction for Undelivered Patients      You were seen for: Labor Assessment  We Consulted: Gatito Carson CNM  You had (Test or Medicine):fetal monitoring, wet prep     Diet:   Drink 8  to 12 glasses of liquids (milk, juice, water) every day.  You may eat meals and snacks.     Activity:  Count fetal kicks everyday (see handout)  Call your doctor or nurse midwife if your baby is moving less than usual.     Call your provider if you notice:  Swelling in your face or increased swelling in your hands or legs.  Headaches that are not relieved by Tylenol (acetaminophen).  Changes in your vision (blurring: seeing spots or stars.)  Nausea (sick to your stomach) and vomiting (throwing up).   Weight gain of 5 pounds or more per week.  Heartburn that doesn't go away.  Signs of bladder infection: pain when you urinate (use the toilet), need to go more often and more urgently.  The bag of gonzalez (rupture of membranes) breaks, or you notice leaking in your underwear.  Bright red blood in your underwear.  Abdominal (lower belly) or stomach pain.  For first baby: Contractions (tightening) less than 5 minutes apart for one hour or more.  Second (plus) baby: Contractions (tightening) less than 10 minutes apart and getting stronger.  *If less than 34 weeks: Contractions (tightenings) more than 6 times in one hour.  Increase or change in vaginal discharge (note the color and amount)  Other: .    Follow-up:  the clinic will call you to set up appt.          Pending Results     Date and Time Order Name Status Description    8/21/2017 0707 Fetal hemoglobin stain Kleihauer In process     8/21/2017 0707 Chlamydia trachomatis PCR In process     8/21/2017 0707 Neisseria gonorrhoea PCR In process     8/21/2017 0700 Urine Culture Aerobic Bacterial In process             Statement of Approval     Ordered          08/21/17 1152  I have reviewed and agree with all the recommendations and orders detailed in this document.  EFFECTIVE NOW     Approved and electronically signed by:  Gatito Carson APRN CNM             Admission Information     Date & Time Provider Department Dept. Phone    8/21/2017 Gatito Carson APRN CNM [facility]  "[phone]      Your Vitals Were     Blood Pressure Pulse Temperature Respirations Height Weight    133/71 95 98.4  F (36.9  C) (Oral) 16 1.803 m (5' 11\") 114.3 kg (252 lb)    BMI (Body Mass Index)                   35.15 kg/m2           MyChart Information     [facility] gives you secure access to your electronic health record. If you see a primary care provider, you can also send messages to your care team and make appointments. If you have questions, please call your primary care clinic.  If you do not have a primary care provider, please call [phone] and they will assist you.        Care EveryWhere ID     This is your Care EveryWhere ID. This could be used by other organizations to access your [facility] medical records  [id number]        Equal Access to Services     [facility] : Kimberley Collier, ann marie bach, tate moses, digna comer. So [facility] [phone].    ATENCIÓN: Si habla español, tiene a wiggins disposición servicios gratuitos de asistencia lingüística. Llame al [phone].    We comply with applicable federal civil rights laws and [place] laws. We do not discriminate on the basis of race, color, national origin, age, disability sex, sexual orientation or gender identity.               Review of your medicines      UNREVIEWED medicines. Ask your doctor about these medicines        Dose / Directions    PRENATAL DHA PO   Used for:  Supervision of high risk pregnancy in first trimester        Dose:  1 tablet   Take 1 tablet by mouth daily   Refills:  0         CONTINUE these medicines which have NOT CHANGED        Dose / Directions    order for DME   Used for:  Hip pain, right, Supervision of high risk pregnancy in first trimester        Maternity Belt order   Quantity:  1 each   Refills:  0                Protect others around you: Learn how to safely use, store and throw away your medicines at www.disposemymeds.org.             Medication " List: This is a list of all your medications and when to take them. Check marks below indicate your daily home schedule. Keep this list as a reference.      Medications           Morning Afternoon Evening Bedtime As Needed    order for DME   Maternity Belt order                                PRENATAL DHA PO   Take 1 tablet by mouth daily                                          More Information        Kick Counts  It s normal to worry about your baby s health. One way you can know your baby s doing well is to record the baby s movements once a day. This is called a kick count. You will usually feel your baby move by the 20th week of pregnancy. Remember to take your kick count records to all your appointments with your healthcare provider.       How to Count Kicks    Choose a time when the baby is active, such as after a meal.     Sit comfortably or lie on your side.     The first time the baby moves, write down the time.     Count each movement until the baby has moved 10 times. This can take from 20 minutes to 2 hours.     If the baby hasn t moved 4 times in 1 hour, pat your stomach to wake the baby up.    Write down the time you feel the baby s 10th movement.    Try to do it at the same time each day.  When to Call Your Healthcare Provider  Call your healthcare provider right away if you notice any of the following:    Your baby moves fewer than 10 times in 2 hours while you re doing kick counts.    Your baby moves much less often than on the days before.    You have not felt your baby move all day.    2420-5086 [facility], [address] [place] [address], [place] [address]. All rights reserved. This information is not intended as a substitute for professional medical care. Always follow your healthcare professional's instructions.

## 2017-08-21 NOTE — DISCHARGE INSTRUCTIONS
Discharge Instruction for Undelivered Patients      You were seen for: Labor Assessment  We Consulted: Gatito Carson CNM  You had (Test or Medicine):fetal monitoring, wet prep     Diet:   Drink 8 to 12 glasses of liquids (milk, juice, water) every day.  You may eat meals and snacks.     Activity:  Count fetal kicks everyday (see handout)  Call your doctor or nurse midwife if your baby is moving less than usual.     Call your provider if you notice:  Swelling in your face or increased swelling in your hands or legs.  Headaches that are not relieved by Tylenol (acetaminophen).  Changes in your vision (blurring: seeing spots or stars.)  Nausea (sick to your stomach) and vomiting (throwing up).   Weight gain of 5 pounds or more per week.  Heartburn that doesn't go away.  Signs of bladder infection: pain when you urinate (use the toilet), need to go more often and more urgently.  The bag of gonzalez (rupture of membranes) breaks, or you notice leaking in your underwear.  Bright red blood in your underwear.  Abdominal (lower belly) or stomach pain.  For first baby: Contractions (tightening) less than 5 minutes apart for one hour or more.  Second (plus) baby: Contractions (tightening) less than 10 minutes apart and getting stronger.  *If less than 34 weeks: Contractions (tightenings) more than 6 times in one hour.  Increase or change in vaginal discharge (note the color and amount)  Other: .    Follow-up:  the clinic will call you to set up appt.

## 2017-08-21 NOTE — IP AVS SNAPSHOT
[facility]    [address]    [place] [address]    Phone:  [phone]                                       After Visit Summary   8/21/2017    Ani Aquino    MRN: [id number]           After Visit Summary Signature Page     I have received my discharge instructions, and my questions have been answered. I have discussed any challenges I see with this plan with the nurse or doctor.    ..........................................................................................................................................  Patient/Patient Representative Signature      ..........................................................................................................................................  Patient Representative Print Name and Relationship to Patient    ..................................................               ................................................  Date                                            Time    ..........................................................................................................................................  Reviewed by Signature/Title    ...................................................              ..............................................  Date                                                            Time

## 2017-08-21 NOTE — PLAN OF CARE
After morning assessment, labs and fetal and uterine monitoring, it was determined that pt could be discharged to home.  Discharge paper work / instructions provided.  Pt has no further questions.  Pt to anticipate call from clinic for follow up appt.

## 2017-08-21 NOTE — PROGRESS NOTES
"Blood pressure 133/71, pulse 95, temperature 98.4  F (36.9  C), temperature source Oral, resp. rate 16, height 1.803 m (5' 11\"), weight 114.3 kg (252 lb), not currently breastfeeding.    General appearance: comfortable; reports irregular, mild contractions  CONTRACTIONS: irreg and mild, q 2-6 minutes  FETAL HEART TONES: continuous EFM- baseline 130 with moderate variability and positive accelerations. No decelerations.  ROM: not ruptured  PELVIC EXAM: 2/ long/ Posterior/ -4    ASSESSMENT:  ==============  IUP @ 34w6d evaluation for  labor   Fetal Heart Rate Tracing category one  GBS- negative    PLAN:  ===========  Observe and re-evaluate in 2 hours or prn  Consulted with Dr. Harrison regarding patient's discharge, contractions and whether candidate for betamethasone.  Dr. Harrison advised if no cervical change, patient should not receive betamethasone and can be discharged home.    JENNIFER Otero CNM    "

## 2017-08-22 ENCOUNTER — HOSPITAL ENCOUNTER (INPATIENT)
Facility: CLINIC | Age: 32
LOS: 2 days | Discharge: HOME | End: 2017-08-25
Attending: ADVANCED PRACTICE MIDWIFE | Admitting: ADVANCED PRACTICE MIDWIFE
Payer: COMMERCIAL

## 2017-08-22 ENCOUNTER — TELEPHONE (OUTPATIENT)
Dept: OBGYN | Facility: CLINIC | Age: 32
End: 2017-08-22

## 2017-08-22 DIAGNOSIS — O34.219 VBAC, DELIVERED: Primary | ICD-10-CM

## 2017-08-22 DIAGNOSIS — Z30.011 ENCOUNTER FOR INITIAL PRESCRIPTION OF CONTRACEPTIVE PILLS: ICD-10-CM

## 2017-08-22 PROBLEM — O36.8190 DECREASED FETAL MOVEMENT: Status: ACTIVE | Noted: 2017-08-22

## 2017-08-22 LAB
BACTERIA SPEC CULT: NO GROWTH
C TRACH DNA SPEC QL NAA+PROBE: NEGATIVE
N GONORRHOEA DNA SPEC QL NAA+PROBE: NEGATIVE
SPECIMEN SOURCE: NORMAL

## 2017-08-22 PROCEDURE — 99215 OFFICE O/P EST HI 40 MIN: CPT | Mod: 25

## 2017-08-22 PROCEDURE — 84112 EVAL AMNIOTIC FLUID PROTEIN: CPT | Performed by: ADVANCED PRACTICE MIDWIFE

## 2017-08-22 PROCEDURE — 59025 FETAL NON-STRESS TEST: CPT

## 2017-08-22 NOTE — IP AVS SNAPSHOT
[facility]    [address] [facility] [address]    Phone:  [phone]                                       After Visit Summary   8/22/2017    Ani Aquino    MRN: [id number]           After Visit Summary Signature Page     I have received my discharge instructions, and my questions have been answered. I have discussed any challenges I see with this plan with the nurse or doctor.    ..........................................................................................................................................  Patient/Patient Representative Signature      ..........................................................................................................................................  Patient Representative Print Name and Relationship to Patient    ..................................................               ................................................  Date                                            Time    ..........................................................................................................................................  Reviewed by Signature/Title    ...................................................              ..............................................  Date                                                            Time

## 2017-08-22 NOTE — IP AVS SNAPSHOT
MRN:[id number]                      After Visit Summary   8/22/2017    Ani Aquino    MRN: [id number]           Thank you!     Thank you for choosing [facility] for your care. Our goal is always to provide you with excellent care. Hearing back from our patients is one way we can continue to improve our services. Please take a few minutes to complete the written survey that you may receive in the mail after you visit with us. Thank you!        Patient Information     Date Of Birth          1985        Designated Caregiver       Most Recent Value    Caregiver    Will someone help with your care after discharge? no      About your hospital stay     You were admitted on:  August 22, 2017 You last received care in the:  [facility]    You were discharged on:  August 25, 2017       Who to Call     For medical emergencies, please call 911.  For non-urgent questions about your medical care, please call your primary care provider or clinic, None          Attending Provider     Provider Specialty    Reba Freire APRN CNM Midwives       Primary Care Provider    Physician No Ref-Primary      Your next 10 appointments already scheduled     Sep 06, 2017  1:30 PM CDT   ULTRASOUND with [facility] ULTRASOUND   [facility] ([facility])    [facility] [address]  [address]  [facility] [address]   [phone]            Sep 06, 2017  2:15 PM CDT   RETURN OB with JENNIFER Ritter CNM   [facility] ([facility])    [facility] [address]  [address]  [facility] [address]   [phone]              Further instructions from your care team       Postpartum Vaginal Delivery Instructions    Activity       Ask family and friends for help when you need it.    Do not place anything in your vagina for 6 weeks.    You are not restricted on other activities, but take it easy for a few weeks to  "allow your body to recover from delivery.  You are able to do any activities you feel up to that point.    No driving until you have stopped taking your pain medications (usually two weeks after delivery).     Call your health care provider if you have any of these symptoms:       Increased pain, swelling, redness, or fluid around your stiches from an episiotomy or perineal tear.    A fever above 100.4 F (38 C) with or without chills when placing a thermometer under your tongue.    You soak a sanitary pad with blood within 1 hour, or you see blood clots larger than a golf ball.    Bleeding that lasts more than 6 weeks.    Vaginal discharge that smells bad.    Severe pain, cramping or tenderness in your lower belly area.    A need to urinate more frequently (use the toilet more often), more urgently (use the toilet very quickly), or it burns when you urinate.    Nausea and vomiting.    Redness, swelling or pain around a vein in your leg.    Problems breastfeeding or a red or painful area on your breast.    Chest pain and cough or are gasping for air.    Problems coping with sadness, anxiety, or depression.  If you have any concerns about hurting yourself or the baby, call your provider immediately.     You have questions or concerns after you return home.     Keep your hands clean:  Always wash your hands before touching your perineal area and stitches.  This helps reduce your risk of infection.  If your hands aren't dirty, you may use an alcohol hand-rub to clean your hands. Keep your nails clean and short.        Pending Results     No orders found from 8/20/2017 to 8/23/2017.            Admission Information     Date & Time Provider Department Dept. Phone    8/22/2017 Reba Freire, JENNIFER DAO [facility] [phone]      Your Vitals Were     Blood Pressure Pulse Temperature Respirations Height Weight    109/66 (BP Location: Left arm) 91 98.3  F (36.8  C) (Oral) 18 1.803 m (5' 11\") 114.3 kg (252 lb)    Pulse " Oximetry BMI (Body Mass Index)                98% 35.15 kg/m2          MyChart Information     [facility] gives you secure access to your electronic health record. If you see a primary care provider, you can also send messages to your care team and make appointments. If you have questions, please call your primary care clinic.  If you do not have a primary care provider, please call [phone] and they will assist you.        Care EveryWhere ID     This is your Care EveryWhere ID. This could be used by other organizations to access your [facility] medical records  [id number]        Equal Access to Services     [facility]: Hadnidhi Collier, wanessa bach, tate rodriguezalanna moses, digna virk . So [facility] [phone].    ATENCIÓN: Si habla español, tiene a wiggins disposición servicios gratuitos de asistencia lingüística. Byron al [phone].    We comply with applicable federal civil rights laws and [place] laws. We do not discriminate on the basis of race, color, national origin, age, disability sex, sexual orientation or gender identity.               Review of your medicines      START taking        Dose / Directions    hydrocortisone 2.5 % cream        Place rectally 3 times daily as needed (hemorrhoids)   Quantity:  15 g   Refills:  1       ibuprofen 400 MG tablet   Commonly known as:  ADVIL/MOTRIN        Dose:  400-800 mg   Take 1-2 tablets (400-800 mg) by mouth every 6 hours as needed for other (cramping)   Quantity:  120 tablet   Refills:  0       norethindrone 0.35 MG per tablet   Commonly known as:  MICRONOR   Used for:  Encounter for initial prescription of contraceptive pills        Dose:  1 tablet   Take 1 tablet (0.35 mg) by mouth daily   Quantity:  84 tablet   Refills:  1       senna-docusate 8.6-50 MG per tablet   Commonly known as:  SENOKOT-S;PERICOLACE        Dose:  1-2 tablet   Take 1-2 tablets by mouth 2 times daily   Quantity:  100 tablet   Refills:  0          CONTINUE these medicines which have NOT CHANGED        Dose / Directions    order for DME   Used for:  Hip pain, right, Supervision of high risk pregnancy in first trimester        Maternity Belt order   Quantity:  1 each   Refills:  0       PRENATAL DHA PO   Used for:  Supervision of high risk pregnancy in first trimester        Dose:  1 tablet   Take 1 tablet by mouth daily   Refills:  0            Where to get your medicines      These medications were sent to [facility] [place], [place] - [address]  [address], [facility] [address]     Phone:  [phone]     hydrocortisone 2.5 % cream    ibuprofen 400 MG tablet    norethindrone 0.35 MG per tablet    senna-docusate 8.6-50 MG per tablet                Protect others around you: Learn how to safely use, store and throw away your medicines at www.disposemymeds.org.             Medication List: This is a list of all your medications and when to take them. Check marks below indicate your daily home schedule. Keep this list as a reference.      Medications           Morning Afternoon Evening Bedtime As Needed    hydrocortisone 2.5 % cream   Place rectally 3 times daily as needed (hemorrhoids)   Last time this was given:  8/24/2017  6:50 PM                                ibuprofen 400 MG tablet   Commonly known as:  ADVIL/MOTRIN   Take 1-2 tablets (400-800 mg) by mouth every 6 hours as needed for other (cramping)   Last time this was given:  800 mg on 8/25/2017  5:09 AM                                norethindrone 0.35 MG per tablet   Commonly known as:  MICRONOR   Take 1 tablet (0.35 mg) by mouth daily                                order for DME   Maternity Belt order                                PRENATAL DHA PO   Take 1 tablet by mouth daily                                senna-docusate 8.6-50 MG per tablet   Commonly known as:  SENOKOT-S;PERICOLACE   Take 1-2 tablets by mouth 2 times daily   Last time this was given:  2  tablets on 8/24/2017 10:51 PM

## 2017-08-23 ENCOUNTER — ANESTHESIA EVENT (OUTPATIENT)
Dept: OBGYN | Facility: CLINIC | Age: 32
End: 2017-08-23
Payer: COMMERCIAL

## 2017-08-23 ENCOUNTER — ANESTHESIA (OUTPATIENT)
Dept: OBGYN | Facility: CLINIC | Age: 32
End: 2017-08-23
Payer: COMMERCIAL

## 2017-08-23 PROBLEM — O42.90 LEAKAGE OF AMNIOTIC FLUID: Status: ACTIVE | Noted: 2017-08-23

## 2017-08-23 PROBLEM — O34.219 VBAC, DELIVERED: Status: ACTIVE | Noted: 2017-08-23

## 2017-08-23 PROBLEM — O36.8190 DECREASED FETAL MOVEMENT: Status: RESOLVED | Noted: 2017-08-22 | Resolved: 2017-08-23

## 2017-08-23 LAB
A1 MICROGLOB PLACENTAL VAG QL: POSITIVE
ABO + RH BLD: NORMAL
ABO + RH BLD: NORMAL
BASOPHILS # BLD AUTO: 0.1 10E9/L (ref 0–0.2)
BASOPHILS NFR BLD AUTO: 0.3 %
BLD GP AB SCN SERPL QL: NORMAL
BLOOD BANK CMNT PATIENT-IMP: NORMAL
DIFFERENTIAL METHOD BLD: ABNORMAL
EOSINOPHIL # BLD AUTO: 0.1 10E9/L (ref 0–0.7)
EOSINOPHIL NFR BLD AUTO: 0.3 %
ERYTHROCYTE [DISTWIDTH] IN BLOOD BY AUTOMATED COUNT: 13.4 % (ref 10–15)
GP B STREP DNA SPEC QL NAA+PROBE: NEGATIVE
HCT VFR BLD AUTO: 39.5 % (ref 35–47)
HGB BLD-MCNC: 13.2 G/DL (ref 11.7–15.7)
IMM GRANULOCYTES # BLD: 0.1 10E9/L (ref 0–0.4)
IMM GRANULOCYTES NFR BLD: 0.7 %
LYMPHOCYTES # BLD AUTO: 2.6 10E9/L (ref 0.8–5.3)
LYMPHOCYTES NFR BLD AUTO: 13.9 %
MCH RBC QN AUTO: 31.7 PG (ref 26.5–33)
MCHC RBC AUTO-ENTMCNC: 33.4 G/DL (ref 31.5–36.5)
MCV RBC AUTO: 95 FL (ref 78–100)
MONOCYTES # BLD AUTO: 1.3 10E9/L (ref 0–1.3)
MONOCYTES NFR BLD AUTO: 6.9 %
NEUTROPHILS # BLD AUTO: 14.6 10E9/L (ref 1.6–8.3)
NEUTROPHILS NFR BLD AUTO: 77.9 %
NRBC # BLD AUTO: 0 10*3/UL
NRBC BLD AUTO-RTO: 0 /100
PLATELET # BLD AUTO: 234 10E9/L (ref 150–450)
RBC # BLD AUTO: 4.17 10E12/L (ref 3.8–5.2)
SPECIMEN EXP DATE BLD: NORMAL
SPECIMEN SOURCE: NORMAL
T PALLIDUM IGG+IGM SER QL: NEGATIVE
WBC # BLD AUTO: 18.7 10E9/L (ref 4–11)

## 2017-08-23 PROCEDURE — 87653 STREP B DNA AMP PROBE: CPT | Performed by: ADVANCED PRACTICE MIDWIFE

## 2017-08-23 PROCEDURE — 99215 OFFICE O/P EST HI 40 MIN: CPT | Mod: 25

## 2017-08-23 PROCEDURE — 25000125 ZZHC RX 250: Performed by: ANESTHESIOLOGY

## 2017-08-23 PROCEDURE — 25000128 H RX IP 250 OP 636: Performed by: ADVANCED PRACTICE MIDWIFE

## 2017-08-23 PROCEDURE — 00HU33Z INSERTION OF INFUSION DEVICE INTO SPINAL CANAL, PERCUTANEOUS APPROACH: ICD-10-PCS | Performed by: ANESTHESIOLOGY

## 2017-08-23 PROCEDURE — 25000132 ZZH RX MED GY IP 250 OP 250 PS 637: Performed by: ADVANCED PRACTICE MIDWIFE

## 2017-08-23 PROCEDURE — 59025 FETAL NON-STRESS TEST: CPT

## 2017-08-23 PROCEDURE — 86780 TREPONEMA PALLIDUM: CPT | Performed by: ADVANCED PRACTICE MIDWIFE

## 2017-08-23 PROCEDURE — 36415 COLL VENOUS BLD VENIPUNCTURE: CPT | Performed by: ADVANCED PRACTICE MIDWIFE

## 2017-08-23 PROCEDURE — 3E0R3CZ INTRODUCTION OF REGIONAL ANESTHETIC INTO SPINAL CANAL, PERCUTANEOUS APPROACH: ICD-10-PCS | Performed by: ANESTHESIOLOGY

## 2017-08-23 PROCEDURE — 25000125 ZZHC RX 250: Performed by: ADVANCED PRACTICE MIDWIFE

## 2017-08-23 PROCEDURE — 12000030 ZZH R&B OB INTERMEDIATE UMMC

## 2017-08-23 PROCEDURE — 25000125 ZZHC RX 250

## 2017-08-23 PROCEDURE — 0DQR0ZZ REPAIR ANAL SPHINCTER, OPEN APPROACH: ICD-10-PCS | Performed by: ADVANCED PRACTICE MIDWIFE

## 2017-08-23 PROCEDURE — 25000128 H RX IP 250 OP 636: Performed by: ANESTHESIOLOGY

## 2017-08-23 PROCEDURE — 86901 BLOOD TYPING SEROLOGIC RH(D): CPT | Performed by: ADVANCED PRACTICE MIDWIFE

## 2017-08-23 PROCEDURE — 85025 COMPLETE CBC W/AUTO DIFF WBC: CPT | Performed by: ADVANCED PRACTICE MIDWIFE

## 2017-08-23 PROCEDURE — 86900 BLOOD TYPING SEROLOGIC ABO: CPT | Performed by: ADVANCED PRACTICE MIDWIFE

## 2017-08-23 PROCEDURE — 86850 RBC ANTIBODY SCREEN: CPT | Performed by: ADVANCED PRACTICE MIDWIFE

## 2017-08-23 PROCEDURE — 72200001 ZZH LABOR CARE VAGINAL DELIVERY SINGLE

## 2017-08-23 RX ORDER — NALOXONE HYDROCHLORIDE 0.4 MG/ML
.1-.4 INJECTION, SOLUTION INTRAMUSCULAR; INTRAVENOUS; SUBCUTANEOUS
Status: DISCONTINUED | OUTPATIENT
Start: 2017-08-23 | End: 2017-08-23

## 2017-08-23 RX ORDER — OXYTOCIN/0.9 % SODIUM CHLORIDE 30/500 ML
340 PLASTIC BAG, INJECTION (ML) INTRAVENOUS CONTINUOUS PRN
Status: DISCONTINUED | OUTPATIENT
Start: 2017-08-23 | End: 2017-08-25 | Stop reason: HOSPADM

## 2017-08-23 RX ORDER — OXYTOCIN 10 [USP'U]/ML
INJECTION, SOLUTION INTRAMUSCULAR; INTRAVENOUS
Status: DISCONTINUED
Start: 2017-08-23 | End: 2017-08-23 | Stop reason: WASHOUT

## 2017-08-23 RX ORDER — EPHEDRINE SULFATE 50 MG/ML
5 INJECTION, SOLUTION INTRAMUSCULAR; INTRAVENOUS; SUBCUTANEOUS
Status: DISCONTINUED | OUTPATIENT
Start: 2017-08-23 | End: 2017-08-23

## 2017-08-23 RX ORDER — ACETAMINOPHEN 325 MG/1
650 TABLET ORAL EVERY 4 HOURS PRN
Status: DISCONTINUED | OUTPATIENT
Start: 2017-08-23 | End: 2017-08-23

## 2017-08-23 RX ORDER — IBUPROFEN 800 MG/1
800 TABLET, FILM COATED ORAL
Status: DISCONTINUED | OUTPATIENT
Start: 2017-08-23 | End: 2017-08-23

## 2017-08-23 RX ORDER — IBUPROFEN 400 MG/1
400-800 TABLET, FILM COATED ORAL EVERY 6 HOURS PRN
Status: DISCONTINUED | OUTPATIENT
Start: 2017-08-23 | End: 2017-08-25 | Stop reason: HOSPADM

## 2017-08-23 RX ORDER — LIDOCAINE 40 MG/G
CREAM TOPICAL
Status: DISCONTINUED | OUTPATIENT
Start: 2017-08-23 | End: 2017-08-23

## 2017-08-23 RX ORDER — OXYTOCIN/0.9 % SODIUM CHLORIDE 30/500 ML
100-340 PLASTIC BAG, INJECTION (ML) INTRAVENOUS CONTINUOUS PRN
Status: COMPLETED | OUTPATIENT
Start: 2017-08-23 | End: 2017-08-23

## 2017-08-23 RX ORDER — SODIUM CHLORIDE, SODIUM LACTATE, POTASSIUM CHLORIDE, CALCIUM CHLORIDE 600; 310; 30; 20 MG/100ML; MG/100ML; MG/100ML; MG/100ML
INJECTION, SOLUTION INTRAVENOUS
Status: DISCONTINUED
Start: 2017-08-23 | End: 2017-08-23 | Stop reason: HOSPADM

## 2017-08-23 RX ORDER — ONDANSETRON 2 MG/ML
4 INJECTION INTRAMUSCULAR; INTRAVENOUS EVERY 6 HOURS PRN
Status: DISCONTINUED | OUTPATIENT
Start: 2017-08-23 | End: 2017-08-23

## 2017-08-23 RX ORDER — OXYTOCIN 10 [USP'U]/ML
10 INJECTION, SOLUTION INTRAMUSCULAR; INTRAVENOUS
Status: DISCONTINUED | OUTPATIENT
Start: 2017-08-23 | End: 2017-08-25 | Stop reason: HOSPADM

## 2017-08-23 RX ORDER — BISACODYL 10 MG
10 SUPPOSITORY, RECTAL RECTAL DAILY PRN
Status: DISCONTINUED | OUTPATIENT
Start: 2017-08-25 | End: 2017-08-25 | Stop reason: HOSPADM

## 2017-08-23 RX ORDER — PENICILLIN G POTASSIUM 5000000 [IU]/1
5 INJECTION, POWDER, FOR SOLUTION INTRAMUSCULAR; INTRAVENOUS ONCE
Status: COMPLETED | OUTPATIENT
Start: 2017-08-23 | End: 2017-08-23

## 2017-08-23 RX ORDER — ACETAMINOPHEN 325 MG/1
650 TABLET ORAL EVERY 4 HOURS PRN
Status: DISCONTINUED | OUTPATIENT
Start: 2017-08-23 | End: 2017-08-25 | Stop reason: HOSPADM

## 2017-08-23 RX ORDER — SODIUM CHLORIDE, SODIUM LACTATE, POTASSIUM CHLORIDE, CALCIUM CHLORIDE 600; 310; 30; 20 MG/100ML; MG/100ML; MG/100ML; MG/100ML
INJECTION, SOLUTION INTRAVENOUS CONTINUOUS
Status: DISCONTINUED | OUTPATIENT
Start: 2017-08-23 | End: 2017-08-23

## 2017-08-23 RX ORDER — OXYTOCIN/0.9 % SODIUM CHLORIDE 30/500 ML
100 PLASTIC BAG, INJECTION (ML) INTRAVENOUS CONTINUOUS
Status: DISCONTINUED | OUTPATIENT
Start: 2017-08-23 | End: 2017-08-25 | Stop reason: HOSPADM

## 2017-08-23 RX ORDER — NALBUPHINE HYDROCHLORIDE 10 MG/ML
2.5-5 INJECTION, SOLUTION INTRAMUSCULAR; INTRAVENOUS; SUBCUTANEOUS EVERY 6 HOURS PRN
Status: DISCONTINUED | OUTPATIENT
Start: 2017-08-23 | End: 2017-08-23

## 2017-08-23 RX ORDER — LIDOCAINE HYDROCHLORIDE 10 MG/ML
INJECTION, SOLUTION EPIDURAL; INFILTRATION; INTRACAUDAL; PERINEURAL
Status: DISCONTINUED
Start: 2017-08-23 | End: 2017-08-23 | Stop reason: HOSPADM

## 2017-08-23 RX ORDER — OXYTOCIN/0.9 % SODIUM CHLORIDE 30/500 ML
PLASTIC BAG, INJECTION (ML) INTRAVENOUS
Status: COMPLETED
Start: 2017-08-23 | End: 2017-08-23

## 2017-08-23 RX ORDER — LANOLIN 100 %
OINTMENT (GRAM) TOPICAL
Status: DISCONTINUED | OUTPATIENT
Start: 2017-08-23 | End: 2017-08-25 | Stop reason: HOSPADM

## 2017-08-23 RX ORDER — HYDROCORTISONE 2.5 %
CREAM (GRAM) TOPICAL 3 TIMES DAILY PRN
Status: DISCONTINUED | OUTPATIENT
Start: 2017-08-23 | End: 2017-08-25 | Stop reason: HOSPADM

## 2017-08-23 RX ORDER — FENTANYL CITRATE 50 UG/ML
INJECTION, SOLUTION INTRAMUSCULAR; INTRAVENOUS PRN
Status: DISCONTINUED | OUTPATIENT
Start: 2017-08-23 | End: 2017-08-23 | Stop reason: HOSPADM

## 2017-08-23 RX ORDER — LIDOCAINE HYDROCHLORIDE AND EPINEPHRINE 15; 5 MG/ML; UG/ML
3 INJECTION, SOLUTION EPIDURAL
Status: COMPLETED | OUTPATIENT
Start: 2017-08-23 | End: 2017-08-23

## 2017-08-23 RX ORDER — OXYCODONE AND ACETAMINOPHEN 5; 325 MG/1; MG/1
1 TABLET ORAL
Status: DISCONTINUED | OUTPATIENT
Start: 2017-08-23 | End: 2017-08-23

## 2017-08-23 RX ORDER — AMOXICILLIN 250 MG
1-2 CAPSULE ORAL 2 TIMES DAILY
Status: DISCONTINUED | OUTPATIENT
Start: 2017-08-23 | End: 2017-08-25 | Stop reason: HOSPADM

## 2017-08-23 RX ORDER — MISOPROSTOL 200 UG/1
TABLET ORAL
Status: DISCONTINUED
Start: 2017-08-23 | End: 2017-08-23 | Stop reason: HOSPADM

## 2017-08-23 RX ORDER — CARBOPROST TROMETHAMINE 250 UG/ML
250 INJECTION, SOLUTION INTRAMUSCULAR
Status: DISCONTINUED | OUTPATIENT
Start: 2017-08-23 | End: 2017-08-23

## 2017-08-23 RX ORDER — OXYTOCIN 10 [USP'U]/ML
10 INJECTION, SOLUTION INTRAMUSCULAR; INTRAVENOUS
Status: DISCONTINUED | OUTPATIENT
Start: 2017-08-23 | End: 2017-08-23

## 2017-08-23 RX ORDER — METHYLERGONOVINE MALEATE 0.2 MG/ML
200 INJECTION INTRAVENOUS
Status: DISCONTINUED | OUTPATIENT
Start: 2017-08-23 | End: 2017-08-23

## 2017-08-23 RX ORDER — FENTANYL CITRATE 50 UG/ML
50-100 INJECTION, SOLUTION INTRAMUSCULAR; INTRAVENOUS
Status: DISCONTINUED | OUTPATIENT
Start: 2017-08-23 | End: 2017-08-23

## 2017-08-23 RX ORDER — FENTANYL CITRATE 50 UG/ML
20 INJECTION, SOLUTION INTRAMUSCULAR; INTRAVENOUS
Status: DISCONTINUED | OUTPATIENT
Start: 2017-08-23 | End: 2017-08-25 | Stop reason: HOSPADM

## 2017-08-23 RX ORDER — NALOXONE HYDROCHLORIDE 0.4 MG/ML
.1-.4 INJECTION, SOLUTION INTRAMUSCULAR; INTRAVENOUS; SUBCUTANEOUS
Status: DISCONTINUED | OUTPATIENT
Start: 2017-08-23 | End: 2017-08-25 | Stop reason: HOSPADM

## 2017-08-23 RX ORDER — MISOPROSTOL 200 UG/1
400 TABLET ORAL
Status: DISCONTINUED | OUTPATIENT
Start: 2017-08-23 | End: 2017-08-25 | Stop reason: HOSPADM

## 2017-08-23 RX ADMIN — LIDOCAINE HYDROCHLORIDE 20 ML: 10 INJECTION, SOLUTION EPIDURAL; INFILTRATION; INTRACAUDAL; PERINEURAL at 03:50

## 2017-08-23 RX ADMIN — Medication 340 ML/HR: at 03:44

## 2017-08-23 RX ADMIN — SODIUM CHLORIDE, POTASSIUM CHLORIDE, SODIUM LACTATE AND CALCIUM CHLORIDE 1000 ML: 600; 310; 30; 20 INJECTION, SOLUTION INTRAVENOUS at 00:51

## 2017-08-23 RX ADMIN — SENNOSIDES AND DOCUSATE SODIUM 1 TABLET: 8.6; 5 TABLET ORAL at 19:29

## 2017-08-23 RX ADMIN — IBUPROFEN 800 MG: 400 TABLET ORAL at 19:28

## 2017-08-23 RX ADMIN — OXYTOCIN-SODIUM CHLORIDE 0.9% IV SOLN 30 UNIT/500ML 340 ML/HR: 30-0.9/5 SOLUTION at 03:44

## 2017-08-23 RX ADMIN — FENTANYL CITRATE 20 MCG: 50 INJECTION, SOLUTION INTRAMUSCULAR; INTRAVENOUS at 01:15

## 2017-08-23 RX ADMIN — Medication 10 ML/HR: at 01:25

## 2017-08-23 RX ADMIN — PENICILLIN G POTASSIUM 5 MILLION UNITS: 5000000 POWDER, FOR SOLUTION INTRAMUSCULAR; INTRAPLEURAL; INTRATHECAL; INTRAVENOUS at 00:54

## 2017-08-23 RX ADMIN — IBUPROFEN 800 MG: 400 TABLET ORAL at 05:42

## 2017-08-23 RX ADMIN — Medication: at 01:22

## 2017-08-23 RX ADMIN — LIDOCAINE HYDROCHLORIDE,EPINEPHRINE BITARTRATE 3 ML: 15; .005 INJECTION, SOLUTION EPIDURAL; INFILTRATION; INTRACAUDAL; PERINEURAL at 01:19

## 2017-08-23 RX ADMIN — IBUPROFEN 800 MG: 400 TABLET ORAL at 12:11

## 2017-08-23 NOTE — PLAN OF CARE
Problem: Goal Outcome Summary  Goal: Goal Outcome Summary  Pt feeling well today. Declined any pain intervention at this time. Breastfeeding and expressing colostrum to cup feed without issue. Lets staff know prior to feed so baby assessment can be completed. No new concerns at this time.

## 2017-08-23 NOTE — L&D DELIVERY NOTE
Ani Aquino is a 31 year old   at  35w1d presented to labor floor with c/o PPROM and pre term contractions with desire for TOLAC.  Noted clear fluid, admit SVE 7/70/0.  GBS prophylaxis started for unknown <35 weeks after GBS swab obtained.  Pt requested and received epidural anesthesia and progressed to C/C/+1 with molding/caput to +2 at 0154.  Pt with urge to push so practice pushing started, pt lost urge to push and fetal head noted with caput to +2 but no descent of bones. Rested with peanut ball and resumed active second stage at 0250.  Pt oushed effectively with CNM and RN coaching. FHR with variable decels with pushing effort, periods of minimal/moderate variability throughout.  Meconium fluid noted so plan NICU for gestational age, meconium fluid and category 2 tracing so NICU called for delivery. Head delivered OA and restituted to SMITHA . No nuchal cord. Shoulders easily delivered under maternal effort.  Live male  delivered at 0338 over a perineal laceration under epidural anesthesia.  Well flexed, HR>100. Infant directly to maternal abdomen, skin to skin.  Weak cry, spontaneous breath/vigorous cry with Dry/Stim. Delayed cord clamping for 3 minutes then clamped x2 and cut by FOB.   20 units of pitocin infusing via IV after baby.  Cord blood obtained for typing.  Cord segment for gases.  C Intact placenta spontaneously delivered via [place] at 0342.   3 vessel cord. Fundus firm @ U after massage.   Vagina, perineum, and rectum inspected.  Left labial laceration noted oozing - repaired with 4-0 vicryl on an SH, hemostasis achieved.  Partial 3rd degree laceration noted with shredded capsule.  Tamar WRIGHT and Alexis WRIGHT G2 to bedside to repair, see note.  CNM left bedside to attend another franny.  Hemostasis noted per MD.    Mother and infant stable, continued skin to skin.    Apgars 8/9.  Weight 0oxl29df.    mL    Cord gases pending        Delivery Note  IUP at 36 weeks gestation delivered on  2017.     delivery of a viable Male infant.  Weight : 6 pounds 13 ounces   Apgars of 8 at 1 minute and 9 at 5 minutes.  Labor was spontaneous.  Medications administered  in labor:  Pain Rx Epidural; Antibiotics Yes: PCN for GBS unknown; Other none  Perineum: Partial 3rd degree.  Left labiabl  Placenta-mechanism: spontaneous, intact,  with a 3 vessel cord. IV oxytocin was given.  Quantitative Blood Loss was 956.  Complications of pregnancy, labor and delivery: Hemorrhage > 500 cc, Prematurity (<37 wks) and Repetative variables (60x60)  Birth attendants  Reba RODRIGUEZ, CNHUGO .  Tamar WRIGHT and Alexis WRIGHT for repair.     Delivery Summary    Ani Aquino MRN# [id number]   Age: 31 year old YOB: 1985     ASSESSMENT & PLAN:         Labor Event Times    Labor onset date:  17 Onset time:  11:00 PM   Dilation complete date:  17 Complete time:   1:54 AM   Start pushing date/time:  2017 0208            Labor Length    1st Stage (hrs):  2 (min):  54   2nd Stage (hrs):  1 (min):  44   3rd Stage (hrs):  0 (min):  4      Labor Events     labor?:  Yes    steroids:  None   Labor Type:  Spontaneous, TOLAC (Trial of Labor After )   Predominate monitoring during 1st stage:  continuous electronic fetal monitoring      Antibiotics received during labor?:  Yes   Reason for Antibiotics:  GBS   Antibiotics received for GBS:  Penicillin   Antibiotics Given (GBS):  Less than or equal to 4 hours prior to delivery      Rupture date/time:     Rupture type:  Spontaneous rupture of membranes occuring during spontaneous labor or augmentation   Fluid color:  Clear, Meconium   Fluid odor:  Normal      1:1 continuous labor support provided by?:  RN Labor partogram used?:  no         Delivery/Placenta Date and Time    Delivery Date:  17 Delivery Time:   3:38 AM   Placenta Date/Time:  2017  3:42 AM   Oxytocin given at the time of delivery:  after delivery of baby      Vaginal  Counts    Initial count performed by 2 team members:   Two Team Members   kimberly miles          Needles Suture [place] Sponges Instruments   Initial counts 2  5    Added to count  4 10    Final counts 2         Placed during labor Accounted for at the end of labor   NA NA   NA NA    NA               Apgars    Living status:  Living    1 Minute 5 Minute 10 Minute 15 Minute 20 Minute   Skin color: 1  1       Heart rate: 2  2       Reflex irritability: 2  2       Muscle tone: 1  2       Respiratory effort: 2  2       Total: 8  9          Apgars assigned by:  /JENNIFER KILGORE,LONNIE      Cord     Complications:  None   Cord Blood Disposition:  Lab Gases Sent?:  Yes          Resuscitation    Methods:  None       Care at Delivery:  Called to delivery by JENNIFER Garnica, SYLWIA for  delivery. Infant vigorous at birth, placed on mother's chest for 1 minute delayed cord clamping. Dried and stimulated. Spontaneous respiratory effort with lusty cry, breath sounds clear and equal. Brought to preheated radiant warmer for assessment at 3 minutes of life. Dried and stimulated, bulb suctioned mouth and nares. Pulse oximetry initiated; initial SaO2 in high 80's at 5 minutes of life, HR in 170's. Increasing subcostal retractions noted at ~7 minutes of life, CPAP +5, 21% FiO2 initiated. Deep suctioned oropharynx and nasopharynx with 10Fr catheter for small amount of thick secretions at ~10 minutes of life. Infant grunting with continued subcostal/intercostal retractions. CPAP resumed, infant given ~5 PPV breaths (25/5) coinciding with infant's own breaths to improve lung expansion. CPAP discontinued at 15 minutes, mouth suctioned again for small amount of thick secretions. Breath sounds clear and equal with good aeration. 's, SaO2 94-96% in room air. Notable improvement in work of breathing; still some intermittent grunting and intermittent mild subcostal retractions. Pink with acrocyanosis and good  tone. SaO2 stable at 96% in room air. Brought to mother for skin-to-skin and continued transitioning. Left in care of [facility] RN.   JENNIFER Anne, CNP  2017 4:20 AM        [facility] Measurements    Weight:  109 oz       Skin to Skin and Feeding Plan    Skin to skin initiation date/time: 17   Skin to skin with:  Mother   Skin to skin end date/time: 17      How do you plan to feed your baby:  Breastfeeding      Labor Events and Shoulder Dystocia    Fetal Tracing Prior to Delivery:  Category 2   Shoulder dystocia present?:  Neg            Delivery (Maternal) (Provider to Complete) ([id number])    Episiotomy:  None   Perineal lacerations:  3rd Repaired?:  Yes   Vaginal laceration?:  No    Cervical laceration?:  No          Mother's Information  Mother: Ani Aquino #[id number]    Start of Mother's Information     IO Blood Loss  17 2300 - 17 0602    Mom's I/O Activity            End of Mother's Information  Mother: Ani Aquino #[id number]            Delivery - Provider to Complete ([id number])    Delivering clinician:  VERO MCCARTNEY   CNHUGO Care:  Exclusive CNM care in labor   Attempted Delivery Types (Choose all that apply):  Vaginal Birth After    Delivery Type (Choose the 1 that will go to the Birth History):  , Spontaneous                           Placenta    Delayed Cord Clamping:  Done   Date/Time:  2017  3:42 AM   Removal:  Spontaneous   Comments:  Mehdi   Disposition:  Pathology      Anesthesia    Method:  Epidural   Cervical dilation at placement:  4-7         Presentation and Position    Presentation:  Vertex   Position:  Left Occiput Anterior                    JENNIFER White CNM

## 2017-08-23 NOTE — PLAN OF CARE
"Data: Patient [facility] to [facility] at 2306.   Reason for maternal/fetal assessment per patient is Rule Out Labor (dec FM)  .  Patient is a . Prenatal record reviewed.      Obstetric History       T1      L1     SAB0   TAB0   Ectopic0   Multiple0   Live Births1       # Outcome Date GA Lbr Wilbur/2nd Weight Sex Delivery Anes PTL Lv   2 Current            1 Term 13 37w0d  3.345 kg (7 lb 6 oz) F CS-LTranv  N LIVE BIRTH      Name: Megan      . Medical history:   Past Medical History:   Diagnosis Date     Abnormal Pap smear      Irregular heart beat 2016    has had stress test, echo and holtor monitoring.     . Gestational Age 35w0d. VSS. Fetal movement present. Patient denies , backache, , pelvic pressure, UTI symptoms, GI problems, bloody show, vaginal bleeding, edema, headache, visual disturbances, epigastric or URQ pain, abdominal pain, . Support persons Moody present. C/o no FM for \"awhile\". Ctx most of evening.  Action: Verbal consent for EFM. Triage assessment completed. EFM applied for fetal well  Being.. Uterine assessment shows ctx every 1.5-4. Fetal assessment: Presumed adequate fetal oxygenation documented (see flow record).   Response: Reba Freire CNM  informed of arrival and status. Plan per provider is amnisure which was positive. Patient verbalized agreement with plan. Patient transferred to room 456per w/c, oriented to room and call light.      "

## 2017-08-23 NOTE — PROVIDER NOTIFICATION
08/23/17 1500   Provider Notification   Provider Name/Title SYLWIA Ramírez   Method of Notification Electronic Page   Request Evaluate-Remote   Notification Reason Other   Please dc labor, outpatient and epidural order sets.

## 2017-08-23 NOTE — PLAN OF CARE
Problem: Goal Outcome Summary  Goal: Goal Outcome Summary  Outcome: Therapy, progress toward functional goals as expected  Progressed rapidly to complete after epidural. delivered viable male at 0338 with apgars of 8 at one minute and 9 at  Min. NICU in attendance see their note.

## 2017-08-23 NOTE — TELEPHONE ENCOUNTER
31 year old  at 35w0d - Ctx q3-5 minutes for the past 40 minutes.   Just emptied bladder.  32oz bottle of water - drank 2 or 3 of those so far today.   Fetal movement last around 20 minutes ago but hasn't done official kick count.  VB when just went to the bathroom - pinkish red noted in toilet.  No art VB.  Vaginal discharge yellowish/tan noted on underwear. Noted history of C/S, desires TOLAC.  Was seen in triage yesterday with no cervical change. Lives ~20 minutes away, has reliable transportation.   Encouraged 3-4 large glasses of water now, rest for next hour and perform fetal kick count.  Plan to come directly to hospital if not meeting fetal kick count, if CTX increase in frequency/intensity, art VB or LOF.  All pt's questions discussed and answered.  Pt verbalized understanding of and agreement to mahoney of care. Charge RN updated.   Reba RODRIGUEZ CNM

## 2017-08-23 NOTE — ANESTHESIA PROCEDURE NOTES
Combined Spinal/Epidural procedure note    Staff  Anesthesiologist:  WILLIE HEALY  Location:  OB  Timeout  patient identified, IV checked, site marked, risks and benefits discussed, informed consent, monitors and equipment checked, pre-op evaluation, at physician/surgeon's request and post-op pain management    Correct Patient: Yes    Correct Position: Yes    Correct Site: Yes    Correct Procedure: Yes    Correct Laterality:  Yes  Site Marked:  Yes    Procedure details  Procedure:  Combined Spinal/Epidural (CSE)  Sterile Prep: chloraprep, patient draped, mask and sterile gloves    Insertion site:  L3-4  Local skin infiltration:  1% lidocaine  amount (mL):  3  Approach:  Midline  Epidural Needle Type:  BonitaSoft  Needle gauge (G):  17  Needle length (in):  3.5  Injection Technique:  LORT saline  DARIUSZ at (cm):  7  Attempts:  1  Redirects:  0  Spinal Needle (G):  25  Spinal Needle Type:  Pencan  Spinal Needle Length (in):  5  Catheter gauge (G):  19  Catheter threaded easily: Yes    Threaded to skin (cm) :  11  Threaded in epidural space (cm):  4  Paresthesias:  No  CSF with Epidural needle: No    CSF with Spinal needle: Yes    Aspiration negative for Heme or CSF: Yes    Test dose (mL):  3  Local anesthetic for test dose:  Lidocaine 1.5% w/ 1:200,000 epinephrine  Test dose time:  01:19  Test dose negative for signs of intravascular, subdural or intrathecal injection: Yes

## 2017-08-23 NOTE — LACTATION NOTE
This note was copied from a baby's chart.  Met with mom at 1445 today, assist with latching well onto shield and breast massage before and during feeding. Several drops milk visible in shield, frequent swallowing. Her daughter was born @ 37 weeks and had difficulty with latching, used shield but only  for a few weeks.  Mom continued to pump for 11 months, reports had no trouble with supply. Reviewed recommendations for late pre-term feedings including massage before, feed using shield on-cue but at least every 3 hours, hand express after each feeding and pump 2-3 x/today but working up to 6x/day pumping after discharge, feeding back what she gets.  Did first pumping at time of visit and taught both finger/dropper and cup feeding for the 5.5mL elicited.  Reviewed feeding log and Breastfeeding resource list.  Offer support and encouragement, answered questions.

## 2017-08-23 NOTE — PLAN OF CARE
Problem: Goal Outcome Summary  Goal: Goal Outcome Summary  Outcome: Improving  No complaints of pain. Fundus firm and midline, U/U. Breastfeeding with assistance latching baby. Vss

## 2017-08-23 NOTE — PLAN OF CARE
Problem: Goal Outcome Summary  Goal: Goal Outcome Summary  Outcome: Improving  Pt initially unable to void after several attempts this am. Pt's uterus remained midline despite a bladder scan showing 665ml of urine. Pt did not feel an urge to void. Pt attempted to void using running water, trixie bottle and peppermint essential oil inhalation. She then tried to soak in the tub and immediately was able to void. She was able to void again since. Her uterus remains midline.  Pt did complain of some cramping which was relieved with ibuprofen. She has required assistance with breastfeeding her 35 week baby but is becoming more independent with breastfeeding with a shield and hand expression colostrum after feedings. She met with lactation today and will try to pump once this evening.  Plan: continue to assist with feedings as needed.

## 2017-08-23 NOTE — PROGRESS NOTES
Late entry    Normal Spontaneous Vaginal Delivery Note  2017     Called to see patient after spontaneous vaginal birth after  () delivery of liveborn male infant to assist with identification of type of perineal laceration and repair.    Pt with adequate epidural anesthesia to allow thorough inspection of perineum and vagina in delivery room.    Partial 3rd degree perineal laceration identified with brisk bleeding noted from perineal aspects.  Torn capsule reinforced with end to end sutures of 3-0 vicryl; internal sphincter musculature not damaged/intact (confirmed with rectal exam).  Rectal exam after repair of capsule revealed no sutures palpable in rectum, increased integrity of sphincter complex.  2nd degree repaired with 3-0 vicryl in the usual fashion achieving hemostasis.  No complications noted.  EBL from repair ~ 250 mL.     Martha Boyle MD MPH

## 2017-08-23 NOTE — PLAN OF CARE
Problem: Goal Outcome Summary  Goal: Goal Outcome Summary  Outcome: Improving  Patient arrived to unit at 0630, vital signs stable

## 2017-08-23 NOTE — PROGRESS NOTES
ADMIT NOTE  =================  35w1d  Ani Aquino is a 31 year old female     with an No LMP recorded. Patient is pregnant. and Estimated Date of Delivery: Sep 26, 2017 [facility] 2017 at 12:34 AM in active labor.   Fetal movement- decreased since 2300  ROM- yes, moderate clear   GBS- not done    HPI  ================  Pt had been having on/off contractions since 2100 - hydrated and rested but CTX continued and decreased fetal movement reported.  She is also leaking some clear fluid, unsure if it's discharge or ROM. No VB.   FOB- is involved, Moody, supportive at bedside.  Other labor support- None identified.  Family is watching her first child.     PRENATAL COURSE  =================  Prenatal course was   complicated by    Patient Active Problem List    Diagnosis Date Noted     Leakage of amniotic fluid 2017     Priority: Medium     , delivered 2017     Priority: Medium      labor 2017     Priority: Medium     Large for dates affecting management of mother, third trimester, not applicable or unspecified fetus 2017     Priority: Medium     17: Measuring S>D. Growth ultrasound: growth 83rd percentile, AC > 97th percentile , MAURO 14.4  -Repeat GCT per Dr. Cartagena - ordered  [ 127  ]  -Repeat growth in 4 weeks [   ]       SI (sacroiliac) joint dysfunction 2017     Priority: Medium     Hip pain, right 2017     Priority: Medium     17: PT referral placed, maternity belt prescription given       Rubella non-immune status, antepartum 2017     Priority: Medium     17: Equivocal with NOB labs, repeat with 3rd trimester labs, offer vaccine postpartum if non-immune.        Vitamin D deficiency 02/10/2017     Priority: Medium     17: Vit D = 28, encouraged supplementation  17- vit D-60.  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 Scanned in media.   17: TOLAC Consent Signed and scanned.       History of  2017     Priority: Medium     Hx of  in  due to fetal intolerance, HILLARY for op report sent  Desires TOLAC    17: EOB next visit- needs tolac consent   Need OP report  17: OP note reviewed.  Please review at HR rounds: uterine incision extended laterally on both sides, double layer closure.  OK for TOLAC?   17: Reviewed Op report with Dr. Boyle at HR Rounds. \"uterine incision extended laterally on both sides\" is a description of the routine way that hysterotomy is done.  Hx also reviewed. Okay for TOLAC.   17: TOLAC consent signed and scanned.        Irregular heart beat 2016     Priority: Medium     has had stress test, echo and holtor monitoring.        Regular care received at [facility].  Started care at 7 weeks gestation, 9 total visits.  TWG  252 - 229 =  23 #.  Pre pregnancy BMI 32.  Height 71in  Denies smoking, drug, etoh use during pregnancy.    HISTORIES  ============  Prenatal record reviewed  No Known Allergies  Past Medical History:   Diagnosis Date     Abnormal Pap smear      Irregular heart beat 2016    has had stress test, echo and holtor monitoring.       Past Surgical History:   Procedure Laterality Date      SECTION      low transverse   .  Family History   Problem Relation Age of Onset     DIABETES Maternal Aunt      DIABETES Maternal Uncle      Coronary Artery Disease Paternal Grandmother      Other Cancer Paternal Grandmother      Coronary Artery Disease Paternal Grandfather      Hypertension Father      Breast Cancer Mother      Depression Maternal Grandfather      Other Cancer Maternal Grandfather      Asthma Maternal Grandfather      OSTEOPOROSIS Maternal " "Grandfather      Social History   Substance Use Topics     Smoking status: Never Smoker     Smokeless tobacco: Never Used     Alcohol use No     Obstetric History       T1      L1     SAB0   TAB0   Ectopic0   Multiple0   Live Births1       # Outcome Date GA Lbr Wilbur/2nd Weight Sex Delivery Anes PTL Lv   2 Current            1 Term 13 37w0d  3.345 kg (7 lb 6 oz) F CS-LTranv  N LIVE BIRTH      Name: [facility]           LABS:   ===========  Rhogam not indicated  Lab Results   Component Value Date    ABO O 2017       Lab Results   Component Value Date    RH  Pos 2017     Rubella NON immune    HIV NR  Lab Results   Component Value Date    HGB 11.6 2017      Lab Results   Component Value Date    HEPBANG Nonreactive 2017     GBS - unknown  other labs- Passed glucose screening    ULTRA SOUND:  ==============  First trimester US- confirmed LMP dating, Second trimester screening US- WNL and MFM level 2 US- WNL.    ROS  =========  Pt denies significant respiratory, cardiovacular, GI, or muscular/skeletalcomplaints.    See RN data base ROS.       PHYSICAL EXAM:  ===============  Blood pressure 118/70, pulse 96, temperature 97.7  F (36.5  C), temperature source Oral, resp. rate 20, height 1.803 m (5' 11\"), weight 114.3 kg (252 lb), not currently breastfeeding.  General appearance: uncomfortable with contractions, requesting pain medication   Heart: RRR without murmur  Lungs: clear to auscultation   Neuro: denies headache and visual disturbances  Psych: Mentation normal and bright   Legs: 2+/2+, no clonus, no edema      Abdomen: gravid, single vertex fetus, non-tender between contractions.   Cephalic confirmed by BSUS  EFW-  6 [id number].   CONTACTIONS: Contractions every 2-4 minutes.  Palpate: moderate  FETAL HEART TONES: baseline 150 with moderate FHR variability and  pos accelerations. + non recurrent variable decelerations present.      PELVIC EXAM: / Mid/ soft/ 0   BLOODY SHOW: no "   ROM: Yes  FLUID: clear  AMNISURE: positive    ASSESSMENT:  ==============  IUP @ 35w1d in in active labor     Fetal Heart rate tracing Category two - moderate variability,+accels  GBS- not done - collected.   TOLAC with consent noted on file     PLAN:  ===========  Admit - see IP orders  Pain medication - Desires epidural, Anesthesia MD notified.   - Prophylactic antibiotic for unknown GBS status <37 weeks  MD consultant on call Tamar/ juice nguyenn     Reba Freire APRN, CNM

## 2017-08-23 NOTE — PROGRESS NOTES
"Labor progress note    S: Pt with rectal pressure, agreeable to SVE for plan of care discussion d/t fetal heart rate tracing.     O:  Blood pressure 138/64, pulse 96, temperature 98.3  F (36.8  C), temperature source Oral, resp. rate 20, height 1.803 m (5' 11\"), weight 114.3 kg (252 lb), SpO2 100 %, not currently breastfeeding.  General appearance: comfortable.  Contractions: Every 2-5 minutes. 50-90 seconds duration.  Palpate: moderate.  Soft resting tone.   FHT: Baseline 150 with moderate variability. Accelerations are present. +recurrent variable decelerations present.  ROM: light meconium fluid . Membranes have been ruptured for 3 hours.  Pelvic exam: 10/ 100%/+1- +2.  Molding/caput to +3 with pushing effort.         Pitocin- none,  Antibiotics- PCN  LR at maintenance rate  Epidural in situ    A:  31 year old  with IUP @ 35w1d second stage labor   Fetal Heart rate tracing Category category two  GBS- not done  Patient Active Problem List   Diagnosis     Supervision of high risk WHS CNM care     History of      Vitamin D deficiency     Rubella non-immune status, antepartum     Irregular heart beat     Hip pain, right     SI (sacroiliac) joint dysfunction     Large for dates affecting management of mother, third trimester, not applicable or unspecified fetus      labor     Leakage of amniotic fluid         P:  - Reviewed active second stage - pt with no strong urge to push now, no fetal descent with practice pushing.   - Continue comfort measures - pt to maternal R lateral with peanut ball. Plan labor down for 1-2 hours with maternal position change prn.  Resume active second stage earlier prn based on fetal status/maternal urge.   - Continue Prophylactic antibiotic for + GBS status  - reevaluate in 1-2 hours/PRN       Reba RODRIGUEZ, NICOLEM    "

## 2017-08-23 NOTE — TELEPHONE ENCOUNTER
31 year old  at 35w0d contacted CNM via  again that fetal movement has not increased and contractions are still occurring despite attempt at hydration and rest.  Pt recommended to come directly to L&D for assessment. Charge RN updated.  Reba RODRIGUEZ CNM

## 2017-08-23 NOTE — ANESTHESIA PREPROCEDURE EVALUATION
Anesthesia Evaluation       history and physical reviewed .      No history of anesthetic complications          ROS/MED HX    ENT/Pulmonary:  - neg pulmonary ROS     Neurologic:  - neg neurologic ROS     Cardiovascular:  - neg cardiovascular ROS       METS/Exercise Tolerance:     Hematologic:         Musculoskeletal:         GI/Hepatic:  - neg GI/hepatic ROS       Renal/Genitourinary:         Endo:         Psychiatric:         Infectious Disease:         Malignancy:         Other:                     Physical Exam      Airway   Mallampati: II  TM distance: > 3 FB  Neck ROM: full  Mouth opening: > 3 cm    Dental     Cardiovascular       Pulmonary           neg OB ROS                 Anesthesia Plan      History & Physical Review      ASA Status:  .  OB Epidural Asa: 2            Postoperative Care      Consents  Anesthetic plan, risks, benefits and alternatives discussed with:  Patient..

## 2017-08-23 NOTE — PLAN OF CARE
Data: Ani Aquino transferred to 7123 via wheelchair at 0610. Baby transferred via parent's arms.  Action: Receiving unit notified of transfer: Yes. Patient and family notified of room change. Report given to Hannah little RN at 0530. Belongings sent to receiving unit. Accompanied by Registered Nurse. Oriented patient to surroundings. Call light within reach. ID bands double-checked with receiving RN.  Response: Patient tolerated transfer and is stable.

## 2017-08-24 LAB — HGB BLD-MCNC: 10.7 G/DL (ref 11.7–15.7)

## 2017-08-24 PROCEDURE — 12000028 ZZH R&B OB UMMC

## 2017-08-24 PROCEDURE — 85018 HEMOGLOBIN: CPT | Performed by: ADVANCED PRACTICE MIDWIFE

## 2017-08-24 PROCEDURE — 36415 COLL VENOUS BLD VENIPUNCTURE: CPT | Performed by: ADVANCED PRACTICE MIDWIFE

## 2017-08-24 PROCEDURE — 25000132 ZZH RX MED GY IP 250 OP 250 PS 637: Performed by: ADVANCED PRACTICE MIDWIFE

## 2017-08-24 PROCEDURE — 90707 MMR VACCINE SC: CPT | Performed by: ADVANCED PRACTICE MIDWIFE

## 2017-08-24 PROCEDURE — 25000128 H RX IP 250 OP 636: Performed by: ADVANCED PRACTICE MIDWIFE

## 2017-08-24 RX ORDER — HYDROCORTISONE 2.5 %
CREAM (GRAM) TOPICAL 3 TIMES DAILY PRN
Qty: 15 G | Refills: 1 | Status: SHIPPED | OUTPATIENT
Start: 2017-08-24 | End: 2017-09-06

## 2017-08-24 RX ORDER — AMOXICILLIN 250 MG
1-2 CAPSULE ORAL 2 TIMES DAILY
Qty: 100 TABLET | Refills: 0 | Status: SHIPPED | OUTPATIENT
Start: 2017-08-24 | End: 2017-09-06

## 2017-08-24 RX ORDER — IBUPROFEN 400 MG/1
400-800 TABLET, FILM COATED ORAL EVERY 6 HOURS PRN
Qty: 120 TABLET | Refills: 0 | Status: SHIPPED | OUTPATIENT
Start: 2017-08-24 | End: 2024-03-06

## 2017-08-24 RX ADMIN — HYDROCORTISONE: 25 CREAM TOPICAL at 18:50

## 2017-08-24 RX ADMIN — IBUPROFEN 800 MG: 400 TABLET ORAL at 15:21

## 2017-08-24 RX ADMIN — IBUPROFEN 800 MG: 400 TABLET ORAL at 09:00

## 2017-08-24 RX ADMIN — MEASLES, MUMPS, AND RUBELLA VIRUS VACCINE LIVE 0.5 ML: 1000; 12500; 1000 INJECTION, POWDER, LYOPHILIZED, FOR SUSPENSION SUBCUTANEOUS at 12:23

## 2017-08-24 RX ADMIN — SENNOSIDES AND DOCUSATE SODIUM 1 TABLET: 8.6; 5 TABLET ORAL at 09:00

## 2017-08-24 RX ADMIN — IBUPROFEN 800 MG: 400 TABLET ORAL at 22:51

## 2017-08-24 RX ADMIN — SENNOSIDES AND DOCUSATE SODIUM 2 TABLET: 8.6; 5 TABLET ORAL at 22:51

## 2017-08-24 NOTE — H&P
ADMIT NOTE  =================  35w1d  Ani Aquino is a 31 year old female     with an No LMP recorded. Patient is pregnant. and Estimated Date of Delivery: Sep 26, 2017 [facility] on 2017 at 12:34 AM in active labor.   Fetal movement- decreased since 2300  ROM- yes, moderate clear   GBS- not done    HPI  ================  Pt had been having on/off contractions since 2100 - hydrated and rested but CTX continued and decreased fetal movement reported.  She is also leaking some clear fluid, unsure if it's discharge or ROM. No VB.   FOB- is involved, Moody, supportive at bedside.  Other labor support- None identified.  Family is watching her first child.     PRENATAL COURSE  =================  Prenatal course was   complicated by    Patient Active Problem List    Diagnosis Date Noted     Leakage of amniotic fluid 2017     Priority: Medium     , delivered 2017     Priority: Medium      labor 2017     Priority: Medium     Large for dates affecting management of mother, third trimester, not applicable or unspecified fetus 2017     Priority: Medium     17: Measuring S>D. Growth ultrasound: growth 83rd percentile, AC > 97th percentile , MAURO 14.4  -Repeat GCT per Dr. Cartagena - ordered  [ 127  ]  -Repeat growth in 4 weeks [   ]       SI (sacroiliac) joint dysfunction 2017     Priority: Medium     Hip pain, right 2017     Priority: Medium     17: PT referral placed, maternity belt prescription given       Rubella non-immune status, antepartum 2017     Priority: Medium     17: Equivocal with NOB labs, repeat with 3rd trimester labs, offer vaccine postpartum if non-immune.        Vitamin D deficiency 02/10/2017     Priority: Medium     17: Vit D = 28, encouraged supplementation  17- vit D-60.  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 Scanned in media.   17: TOLAC Consent Signed and scanned.       History of  2017     Priority: Medium     Hx of  in  due to fetal intolerance, HILLARY for op report sent  Desires TOLAC    17: EOB next visit- needs tolac consent   Need OP report  17: OP note reviewed.  Please review at HR rounds: uterine incision extended laterally on both sides, double layer closure.  OK for TOLAC?   17: Reviewed Op report with Dr. Boyle at HR Rounds. \"uterine incision extended laterally on both sides\" is a description of the routine way that hysterotomy is done.  Hx also reviewed. Okay for TOLAC.   17: TOLAC consent signed and scanned.        Irregular heart beat 2016     Priority: Medium     has had stress test, echo and holtor monitoring.        Regular care received at [facility] clinic.  Started care at 7 weeks gestation, 9 total visits.  TWG  252 - 229 =  23 #.  Pre pregnancy BMI 32.  Height 71in  Denies smoking, drug, etoh use during pregnancy.    HISTORIES  ============  Prenatal record reviewed  No Known Allergies  Past Medical History:   Diagnosis Date     Abnormal Pap smear      Irregular heart beat 2016    has had stress test, echo and holtor monitoring.       Past Surgical History:   Procedure Laterality Date      SECTION      low transverse   .  Family History   Problem Relation Age of Onset     DIABETES Maternal Aunt      DIABETES Maternal Uncle      Coronary Artery Disease Paternal Grandmother      Other Cancer Paternal Grandmother      Coronary Artery Disease Paternal Grandfather      Hypertension Father      Breast Cancer Mother      Depression Maternal Grandfather      Other Cancer Maternal Grandfather      Asthma Maternal Grandfather      OSTEOPOROSIS Maternal " "Grandfather      Social History   Substance Use Topics     Smoking status: Never Smoker     Smokeless tobacco: Never Used     Alcohol use No     Obstetric History       T1      L1     SAB0   TAB0   Ectopic0   Multiple0   Live Births1       # Outcome Date GA Lbr Wilbur/2nd Weight Sex Delivery Anes PTL Lv   2 Current            1 Term 13 37w0d  3.345 kg (7 lb 6 oz) F CS-LTranv  N LIVE BIRTH      Name: [facility]           LABS:   ===========  Rhogam not indicated  Lab Results   Component Value Date    ABO O 2017       Lab Results   Component Value Date    RH  Pos 2017     Rubella NON immune    HIV NR  Lab Results   Component Value Date    HGB 11.6 2017      Lab Results   Component Value Date    HEPBANG Nonreactive 2017     GBS - unknown  other labs- Passed glucose screening    ULTRA SOUND:  ==============  First trimester US- confirmed LMP dating, Second trimester screening US- WNL and MFM level 2 US- WNL.    ROS  =========  Pt denies significant respiratory, cardiovacular, GI, or muscular/skeletalcomplaints.    See RN data base ROS.       PHYSICAL EXAM:  ===============  Blood pressure 118/70, pulse 96, temperature 97.7  F (36.5  C), temperature source Oral, resp. rate 20, height 1.803 m (5' 11\"), weight 114.3 kg (252 lb), not currently breastfeeding.  General appearance: uncomfortable with contractions, requesting pain medication   Heart: RRR without murmur  Lungs: clear to auscultation   Neuro: denies headache and visual disturbances  Psych: Mentation normal and bright   Legs: 2+/2+, no clonus, no edema      Abdomen: gravid, single vertex fetus, non-tender between contractions.   Cephalic confirmed by BSUS  EFW-  6 [id number].   CONTACTIONS: Contractions every 2-4 minutes.  Palpate: moderate  FETAL HEART TONES: baseline 150 with moderate FHR variability and  pos accelerations. + non recurrent variable decelerations present.      PELVIC EXAM: / Mid/ soft/ 0   BLOODY SHOW: no "   ROM: Yes  FLUID: clear  AMNISURE: positive    ASSESSMENT:  ==============  IUP @ 35w1d in in active labor     Fetal Heart rate tracing Category two - moderate variability,+accels  GBS- not done - collected.   TOLAC with consent noted on file     PLAN:  ===========  Admit - see IP orders  Pain medication - Desires epidural, Anesthesia MD notified.   - Prophylactic antibiotic for unknown GBS status <37 weeks  MD consultant on call Tamar/ juice nguyenn     Reba Freire APRN, CNM

## 2017-08-24 NOTE — PLAN OF CARE
Problem: Goal Outcome Summary  Goal: Goal Outcome Summary  Outcome: Improving  VSS. Afebrile. Patient up to bathroom and states voiding. C/o perineum and hemorrhoidal pain/irritation. Lavendar salts given and pt soaked in tub. Pt states using tucks and ice pack with minimal comfort. Ordered up hydrocortisone and will give to pt. Ibuprofen given with some relief. Small drng on peripad and pt changed. Continues to breastfeed with nipple shield but baby fatigues easily. MMR shot given. Pt is pumping and aware to pump after feeds. Hidalgo done with very low score. Up independently and denies dizziness.  and mom present at different times and supportive. Call light in reach and able to make needs known.

## 2017-08-24 NOTE — PROGRESS NOTES
Post Partum Note    Ani Aquino      MRN#: [id number]  Age: 31 year old      YOB: 1985      Post-partum day #1    SIGNIFICANT PROBLEMS:  Patient Active Problem List    Diagnosis Date Noted     Leakage of amniotic fluid 2017     Priority: Medium     , delivered 2017     Priority: Medium      labor 2017     Priority: Medium     Large for dates affecting management of mother, third trimester, not applicable or unspecified fetus 2017     Priority: Medium     17: Measuring S>D. Growth ultrasound: growth 83rd percentile, AC > 97th percentile , MAURO 14.4  -Repeat GCT per Dr. Cartagena - ordered  [ 127  ]  -Repeat growth in 4 weeks [   ]       SI (sacroiliac) joint dysfunction 2017     Priority: Medium     Hip pain, right 2017     Priority: Medium     17: PT referral placed, maternity belt prescription given       Rubella non-immune status, antepartum 2017     Priority: Medium     17: Equivocal with NOB labs, repeat with 3rd trimester labs, offer vaccine postpartum if non-immune.        Vitamin D deficiency 02/10/2017     Priority: Medium     17: Vit D = 28, encouraged supplementation  17- vit D-60.  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 Scanned in media.   17: TOLAC Consent Signed and scanned.       History of  2017     Priority: Medium     Hx of  in  due to fetal intolerance, HILLARY for op report sent  Desires TOLAC    17: EOB next visit- needs tolac consent   Need OP report  17: OP note reviewed.  Please review at HR rounds: uterine incision extended laterally on both sides, double layer closure.  OK for TOLAC?  "  7/18/17: Reviewed Op report with Dr. Boyle at HR Rounds. \"uterine incision extended laterally on both sides\" is a description of the routine way that hysterotomy is done.  Hx also reviewed. Okay for TOLAC.   8/8/17: TOLAC consent signed and scanned.        Irregular heart beat 02/01/2016     Priority: Medium     has had stress test, echo and holtor monitoring.           INTERVAL HISTORY:  /67  Pulse 91  Temp 97.7  F (36.5  C) (Oral)  Resp 16  Ht 1.803 m (5' 11\")  Wt 114.3 kg (252 lb)  SpO2 99%  Breastfeeding? Unknown  BMI 35.15 kg/m2    Pt stable, baby is rooming in  Breast feeding status:initiated  Baby is 35 wks pt is also cup feeding and pumping   Complications since 2 hours post delivery: None  Patient is tolerating acitivity well Voiding without difficulty, cramping is relieved by Ibuprophen, lochia is decreasing and patient denies clots.  Perineal pain is is relieved by Ibuprophen.  The perineum laceration is well approximated  Without edema or ecchymosis,  1 med hemorrhoid noted     Normal postpartum exam day 1    Postpartum hemoglobin   Hemoglobin   Date Value Ref Range Status   08/24/2017 10.7 (L) 11.7 - 15.7 g/dL Final     Blood type   Lab Results   Component Value Date    ABO O 08/23/2017       Lab Results   Component Value Date    RH Pos 08/23/2017     Rubella status No results found for: RUBELLAABIGG    ASSESSMENT/PLAN:  Stable Post-partum day #1  Complications:none  Plan d/c home tomorrow  jper pt baby is doing well and meeting milestones hopes to go 8/25   RTC 6 weeks  Teaching done: D/C Instructions: Nutrition/Activity, Engorgement Management, Birth Control Options, Warning Signs/When to Call: Excessive Bleeding, Infection, PP Depression, Kegals and Crunches, RTC Clinic for PP Appointment and PNV    Postpartum warning s/s reviewed, including bleeding/clots, fever, mastitis, or depression    Birthcontrol planned:Progestin only pills  Reviewed LARC options  , pt does not have a " prescriptionor as d/c meds  Current Discharge Medication List      CONTINUE these medications which have NOT CHANGED    Details   Docosahexaenoic Acid (PRENATAL DHA PO) Take 1 tablet by mouth daily    Associated Diagnoses: Supervision of high risk pregnancy in first trimester      order for DME Maternity Belt order  Qty: 1 each, Refills: 0    Associated Diagnoses: Hip pain, right; Supervision of high risk pregnancy in first trimester             Lois French CNM APRN

## 2017-08-24 NOTE — PLAN OF CARE
Problem: Goal Outcome Summary  Goal: Goal Outcome Summary  Outcome: Improving  Motrin given for perineum pain. Tucks and ice packs used for hemmroids. Fundus firm and midline, U/1. Breastfeeding and pumping independently. Vss

## 2017-08-25 VITALS
RESPIRATION RATE: 18 BRPM | HEART RATE: 91 BPM | DIASTOLIC BLOOD PRESSURE: 66 MMHG | OXYGEN SATURATION: 98 % | WEIGHT: 252 LBS | TEMPERATURE: 98.3 F | SYSTOLIC BLOOD PRESSURE: 109 MMHG | BODY MASS INDEX: 35.28 KG/M2 | HEIGHT: 71 IN

## 2017-08-25 PROBLEM — O42.90 LEAKAGE OF AMNIOTIC FLUID: Status: RESOLVED | Noted: 2017-08-23 | Resolved: 2017-08-25

## 2017-08-25 PROCEDURE — 25000132 ZZH RX MED GY IP 250 OP 250 PS 637: Performed by: ADVANCED PRACTICE MIDWIFE

## 2017-08-25 RX ORDER — ACETAMINOPHEN AND CODEINE PHOSPHATE 120; 12 MG/5ML; MG/5ML
1 SOLUTION ORAL DAILY
Qty: 84 TABLET | Refills: 1 | Status: SHIPPED | OUTPATIENT
Start: 2017-08-25 | End: 2024-03-06

## 2017-08-25 RX ADMIN — IBUPROFEN 800 MG: 400 TABLET ORAL at 12:59

## 2017-08-25 RX ADMIN — IBUPROFEN 800 MG: 400 TABLET ORAL at 05:09

## 2017-08-25 NOTE — PLAN OF CARE
Problem: Goal Outcome Summary  Goal: Goal Outcome Summary  Outcome: Improving  VSS. Afebrile. Up ad fior. Will take shower later in afternoon. Small drng on peripad and pt changed. Breast WNL. Ibuprofen for pain. Hemorrhoids irritating and pt applied cream this morning. Pt understands baby's bili results are elevated and phototherapy will be stated and baby will not be discharged until tomorrow. Breastfeeding well and supplementation with finger. Call light in reach and able to make needs known.

## 2017-08-25 NOTE — PLAN OF CARE
Problem: Goal Outcome Summary  Goal: Goal Outcome Summary  Outcome: Improving  PP assessment WNL. Vital signs stable. Pt up ad fior, steady gait. Intake and output remains appropriate. Encouraged fluid intake. Pain managed with Ibuprofen for perineal pain including hemorrhoids. see MAR. Utilizing double electric breast pump. No further concerns, will continue with pt plan of care.

## 2017-08-25 NOTE — PLAN OF CARE
Problem: Discharge Planning  Goal: Discharge Planning (Adult, OB, Behavioral, Peds)  Outcome: Adequate for Discharge Date Met:  08/25/17  VSS. Afebrile. Discharge instructions given along with discharge meds and pt instructed with verbalization of understanding. Pt discharged to boarder status and understands she can order meals and that she can call the nurses to help with breastfeeding.

## 2017-08-25 NOTE — PLAN OF CARE
Problem: Goal Outcome Summary  Goal: Goal Outcome Summary  Outcome: No Change  VSS. Postpartum checks WDL. Up independently, voiding without difficulty. Pain controlled with ibuprofen. Using cream for hemorrhoids and tuck pads. Breastfeeding well independently, using nipple shield. Pumping and finger-feeding pumped breast milk after feedings. Will continue to monitor.

## 2017-08-25 NOTE — DISCHARGE SUMMARY
[facility] Discharge Summary    Ani Aquino MRN# [id number]   Age: 31 year old YOB: 1985     Date of Admission:  2017  Date of Discharge::  2017  Admitting Physician:  JENNIFER Zhou CNM  Discharge Physician:  Reba Freire CNM, MS      Home clinic: [facility]          Admission Diagnoses:   Maternity(lianna 17) Decreased Fetal Movement  Leakage of amniotic fluid  , delivered          Discharge Diagnosis:   Normal spontaneous vaginal delivery  Intrauterine pregnancy at 35 weeks gestation          Procedures:   Procedure(s): Repair of partial third degree perineal laceration       No other significant procedures performed during this admission           Medications Prior to Admission:     Prescriptions Prior to Admission   Medication Sig Dispense Refill Last Dose     Docosahexaenoic Acid (PRENATAL DHA PO) Take 1 tablet by mouth daily   2017 at Unknown time     order for [facility] Maternity Belt order 1 each 0 Taking             Discharge Medications:     Current Discharge Medication List      START taking these medications    Details   ibuprofen (ADVIL/MOTRIN) 400 MG tablet Take 1-2 tablets (400-800 mg) by mouth every 6 hours as needed for other (cramping)  Qty: 120 tablet, Refills: 0    Associated Diagnoses: , delivered      senna-docusate (SENOKOT-S;PERICOLACE) 8.6-50 MG per tablet Take 1-2 tablets by mouth 2 times daily  Qty: 100 tablet, Refills: 0    Associated Diagnoses: , delivered      hydrocortisone 2.5 % cream Place rectally 3 times daily as needed (hemorrhoids)  Qty: 15 g, Refills: 1    Associated Diagnoses: , delivered       Ani Aquino   [place] Medication Instructions CHRISTOPHER:[id number]    Printed on:17 0804   Medication Information                      Docosahexaenoic Acid (PRENATAL DHA PO)  Take 1 tablet by mouth daily             hydrocortisone 2.5 % cream  Place rectally 3 times daily as needed (hemorrhoids)              ibuprofen (ADVIL/MOTRIN) 400 MG tablet  Take 1-2 tablets (400-800 mg) by mouth every 6 hours as needed for other (cramping)             norethindrone (MICRONOR) 0.35 MG per tablet  Take 1 tablet (0.35 mg) by mouth daily             order for [facility]  Maternity Belt order             senna-docusate (SENOKOT-S;PERICOLACE) 8.6-50 MG per tablet  Take 1-2 tablets by mouth 2 times daily                CONTINUE these medications which have NOT CHANGED    Details   Docosahexaenoic Acid (PRENATAL DHA PO) Take 1 tablet by mouth daily    Associated Diagnoses: Supervision of high risk pregnancy in first trimester      order for DME Maternity Belt order  Qty: 1 each, Refills: 0    Associated Diagnoses: Hip pain, right; Supervision of high risk pregnancy in first trimester                   Consultations:   Consultation during this admission received from OB/GYN MD, lactation          Brief History of Labor:   Ani Aquino is a 31 year old   at  35w1d presented to labor floor with c/o PPROM and pre term contractions with desire for TOLAC.  Noted clear fluid, admit SVE /0.  GBS prophylaxis started for unknown <35 weeks after GBS swab obtained.  Pt requested and received epidural anesthesia and progressed to C/C/+1 with molding/caput to +2 at 0154.  Pt with urge to push so practice pushing started, pt lost urge to push and fetal head noted with caput to +2 but no descent of bones. Rested with peanut ball and resumed active second stage at 0250.  Pt oushed effectively with CNM and RN coaching. FHR with variable decels with pushing effort, periods of minimal/moderate variability throughout.  Meconium fluid noted so plan NICU for gestational age, meconium fluid and category 2 tracing so NICU called for delivery. Head delivered OA and restituted to SMITHA . No nuchal cord. Shoulders easily delivered under maternal effort.  Live male  delivered at 0338 over a perineal laceration under epidural anesthesia.  Well flexed,  HR>100. Infant directly to maternal abdomen, skin to skin.  Weak cry, spontaneous breath/vigorous cry with Dry/Stim. Delayed cord clamping for 3 minutes then clamped x2 and cut by FOB.   20 units of pitocin infusing via IV after baby.  Cord blood obtained for typing.  Cord segment for gases.  C Intact placenta spontaneously delivered via [place] at 0342.   3 vessel cord. Fundus firm @ U after massage.   Vagina, perineum, and rectum inspected.  Left labial laceration noted oozing - repaired with 4-0 vicryl on an SH, hemostasis achieved.  Partial 3rd degree laceration noted with shredded capsule.  Tamar WRIGHT and Alexis WRIGHT G2 to bedside to repair, see note.  CNM left bedside to attend another franny.  Hemostasis noted per MD.    Mother and infant stable, continued skin to skin.    Apgars 8/9.  Weight [id number].    mL    Cord gases pending        Delivery Note  IUP at 36 weeks gestation delivered on 2017.     delivery of a viable Male infant.  Weight : 6 pounds 13 ounces   Apgars of 8 at 1 minute and 9 at 5 minutes.  Labor was spontaneous.  Medications administered  in labor:  Pain Rx Epidural; Antibiotics Yes: PCN for GBS unknown; Other none  Perineum: Partial 3rd degree.  Left labiabl  Placenta-mechanism: spontaneous, intact,  with a 3 vessel cord. IV oxytocin was given.  Quantitative Blood Loss was 956.  Complications of pregnancy, labor and delivery: Hemorrhage > 500 cc, Prematurity (<37 wks) and Repetative variables (60x60)  Birth attendants  Reba Freire  APRN, CNM .  Tamar WRIGHT and Alexis WRIGHT for repair.       Normal Spontaneous Vaginal Delivery Note  2017      Called to see patient after spontaneous vaginal birth after  () delivery of liveborn male infant to assist with identification of type of perineal laceration and repair.     Pt with adequate epidural anesthesia to allow thorough inspection of perineum and vagina in delivery room.     Partial 3rd degree perineal laceration  "identified with brisk bleeding noted from perineal aspects.  Torn capsule reinforced with end to end sutures of 3-0 vicryl; internal sphincter musculature not damaged/intact (confirmed with rectal exam).  Rectal exam after repair of capsule revealed no sutures palpable in rectum, increased integrity of sphincter complex.  2nd degree repaired with 3-0 vicryl in the usual fashion achieving hemostasis.  No complications noted.  EBL from repair ~ 250 mL.    Martha Boyle MD MPH       Assessment Day of Discharge    Vital signs:  Temp: 98.3  F (36.8  C) Temp src: Oral BP: 109/66 Pulse: 91 Heart Rate: 85 Resp: 18 SpO2: 98 % O2 Device: None (Room air)   Height: 180.3 cm (5' 11\") Weight: 114.3 kg (252 lb)  Estimated body mass index is 35.15 kg/(m^2) as calculated from the following:    Height as of this encounter: 1.803 m (5' 11\").    Weight as of this encounter: 114.3 kg (252 lb).      Breasts: Soft, filling  Nipples: Intact, Non-tender  Abdomen: Soft, Non-tender    Diastatis Recti:  <1 FB  Uterus: Fundus Firm, Non-tender, located 1cm below the umbilicus   Lochia: Rubra, appropriate amount    Perineum:  Well-approximated, healing well. No bruising, swelling.  +medium external hemorrhoid.   Lower Extremities: trace Edema Bilateral LE to ankle, Negative Layla's Sign           Hospital Course:   The patient's hospital course was unremarkable.  On discharge, her pain was well controlled. Vaginal bleeding is similar to peak menstrual flow.  Voiding without difficulty.  Ambulating well and tolerating a normal diet.  No fever.  Breastfeeding well and breastpumping good amount of transitional milk.  Infant is stable.  + bowel movement x2 without incident, continues to use stool softener.  Mood stable. She was discharged on post-partum day #2.    Post-partum hemoglobin:   Hemoglobin   Date Value Ref Range Status   08/24/2017 10.7 (L) 11.7 - 15.7 g/dL Final        Rh:positive  Rubella status: Equivocal - received PP booster   Plan " for contraception:POP - has just moved for initial RX will be given today before discharge and then pt will update CNM in office which pharmacy she would like refills to be sent to.   Reviewed Chapter One of  [facility] including warning signs of postpartum, activity level, avoiding IC for 6 weeks, Tub soaks BID, Kegels, abdominal exercises, breast care,  postpartum depression/anxiety. Pt verbalized understanding with teach back.   - Reviewed how to establish/maintain milk supply, nipple care, pumping for storage, fore/hind milk, wet/dirty diapers to expect each day, resources prn if questions or concerns.     FMLA letter given per pt request.          Discharge Instructions and Follow-Up:   Discharge diet: Regular, Iron Rich, High Fiber, Minimum 80oz water daily   Discharge activity: Pelvic rest: abstain from intercourse and do not use tampons for 6-8 week(s)   Discharge follow-up: Follow up with CNM in 2 weeks for mood check/breastfeeding support  Follow up with CNM in 6-8 weeks for postpartum exam and prn   Wound care: Drink plenty of fluids  Ice to area for comfort  Keep wound clean and dry  Avoid constipation           Discharge Disposition:   Discharged to home        JENNIFER White CNM

## 2017-09-06 ENCOUNTER — OFFICE VISIT (OUTPATIENT)
Dept: OBGYN | Facility: CLINIC | Age: 32
End: 2017-09-06
Attending: ADVANCED PRACTICE MIDWIFE
Payer: COMMERCIAL

## 2017-09-06 VITALS — WEIGHT: 226.3 LBS | SYSTOLIC BLOOD PRESSURE: 115 MMHG | DIASTOLIC BLOOD PRESSURE: 77 MMHG | BODY MASS INDEX: 31.56 KG/M2

## 2017-09-06 PROCEDURE — 99211 OFF/OP EST MAY X REQ PHY/QHP: CPT | Mod: ZF

## 2017-09-06 ASSESSMENT — ANXIETY QUESTIONNAIRES
GAD7 TOTAL SCORE: 1
6. BECOMING EASILY ANNOYED OR IRRITABLE: SEVERAL DAYS
3. WORRYING TOO MUCH ABOUT DIFFERENT THINGS: NOT AT ALL
IF YOU CHECKED OFF ANY PROBLEMS ON THIS QUESTIONNAIRE, HOW DIFFICULT HAVE THESE PROBLEMS MADE IT FOR YOU TO DO YOUR WORK, TAKE CARE OF THINGS AT HOME, OR GET ALONG WITH OTHER PEOPLE: NOT DIFFICULT AT ALL
1. FEELING NERVOUS, ANXIOUS, OR ON EDGE: NOT AT ALL
2. NOT BEING ABLE TO STOP OR CONTROL WORRYING: NOT AT ALL
5. BEING SO RESTLESS THAT IT IS HARD TO SIT STILL: NOT AT ALL
7. FEELING AFRAID AS IF SOMETHING AWFUL MIGHT HAPPEN: NOT AT ALL

## 2017-09-06 ASSESSMENT — PAIN SCALES - GENERAL: PAINLEVEL: NO PAIN (0)

## 2017-09-06 ASSESSMENT — PATIENT HEALTH QUESTIONNAIRE - PHQ9
SUM OF ALL RESPONSES TO PHQ QUESTIONS 1-9: 0
5. POOR APPETITE OR OVEREATING: NOT AT ALL

## 2017-09-06 NOTE — NURSING NOTE
Chief Complaint   Patient presents with     Post-partum Follow Up Call   2 week post partum mood check.

## 2017-09-06 NOTE — LETTER
2017       RE: Ani Aquino  [address] [facility] [address]     Dear Colleague,    Thank you for referring your patient, Ani Aquino, to the [facility] at [facility]. Please see a copy of my visit note below.    SUBJECTIVE:  Ani Aquino 31 year old year old female, , who presents for 2 week postpartum mood check.  Pt reports things have been going well during postpartum period. Denies any changes in mood, denies depression or anxiety. Pt has been pumping exclusively, denies any pain or issues. Feels like it is less stressful than worrying about latchDenies any pain, dysuria, constipation or perineal pain. Has progestin-only pills, has not started taking yet.  Is planning for FOB to have vasectomy.    OBJECTIVE:   /77  Wt 102.6 kg (226 lb 4.8 oz)  Breastfeeding? Yes  BMI 31.56 kg/m2  She appears well, afebrile.    Pelvic Exam:  Deferred    ASSESSMENT:   2 weeks postpartum after     PLAN:   Reviewed postpartum depression signs/symptoms, pt aware of resources in clinic.   Return to clinic in 4-6 weeks for routine postpartum exam      Again, thank you for allowing me to participate in the care of your patient.      Sincerely,    JENNIFER Otero CNM

## 2017-09-06 NOTE — MR AVS SNAPSHOT
After Visit Summary   9/6/2017    Ani Aquino    MRN: [id number]           Patient Information     Date Of Birth          1985        Visit Information        Provider Department      9/6/2017 2:15 PM Gatito Carson APRN CNM [facility]        Today's Diagnoses     Routine postpartum follow-up    -  1       Follow-ups after your visit        Follow-up notes from your care team     Return in about 4 weeks (around 10/4/2017) for Postpartum visit.      Your next 10 appointments already scheduled     Oct 17, 2017  9:00 AM CDT   [facility] Post-Partum with JENNIFER Ritter CNM   [facility] ([facility])    [facility] [address]  [address]  [facility] [address] [phone]              Who to contact     Please call your clinic at [phone] to:    Ask questions about your health    Make or cancel appointments    Discuss your medicines    Learn about your test results    Speak to your doctor   If you have compliments or concerns about an experience at your clinic, or if you wish to file a complaint, please contact [facility] Patient Relations at [phone] or email us at [email or web address]         Additional Information About Your Visit        [employer] Information     [employer] gives you secure access to your electronic health record. If you see a primary care provider, you can also send messages to your care team and make appointments. If you have questions, please call your primary care clinic.  If you do not have a primary care provider, please call [phone] and they will assist you.      [employer] is an electronic gateway that provides easy, online access to your medical records. With [employer], you can request a clinic appointment, read your test results, renew a prescription or communicate with your care team.     To access your existing account, please contact  your [facility] or call [phone] for assistance.        Care EveryWhere ID     This is your Care EveryWhere ID. This could be used by other organizations to access your [place] medical records  [id number]        Your Vitals Were     Breastfeeding? BMI (Body Mass Index)                Yes 31.56 kg/m2           Blood Pressure from Last 3 Encounters:   09/06/17 115/77   08/25/17 109/66   08/21/17 133/71    Weight from Last 3 Encounters:   09/06/17 102.6 kg (226 lb 4.8 oz)   08/22/17 114.3 kg (252 lb)   08/21/17 114.3 kg (252 lb)              Today, you had the following     No orders found for display         Today's Medication Changes          These changes are accurate as of: 9/6/17 11:59 PM.  If you have any questions, ask your nurse or doctor.               Stop taking these medicines if you haven't already. Please contact your care team if you have questions.     hydrocortisone 2.5 % cream   Stopped by:  Gatito Carson APRN CNM           order for DME   Stopped by:  Gatito Carson APRN CNM           senna-docusate 8.6-50 MG per tablet   Commonly known as:  SENOKOT-S;PERICOLACE   Stopped by:  Gatito Carson APRN CNM                    Primary Care Provider    Physician No Ref-Primary       No address on file        Equal Access to Services     CHIQUIS CALDERON AH: Kimberley butto Sodorita, waaxda luqadaha, qaybta kaalmada adeegyada, digna comer. So [facility] [phone].    ATENCIÓN: Si habla español, tiene a wiggins disposición servicios gratuitos de asistencia lingüística. Llame al [phone].    We comply with applicable federal civil rights laws and [place] laws. We do not discriminate on the basis of race, color, national origin, age, disability sex, sexual orientation or gender identity.            Thank you!     Thank you for choosing [facility]  for your care. Our goal is always to provide you with excellent care. Hearing  back from our patients is one way we can continue to improve our services. Please take a few minutes to complete the written survey that you may receive in the mail after your visit with us. Thank you!             Your Updated Medication List - Protect others around you: Learn how to safely use, store and throw away your medicines at www.disposemymeds.org.          This list is accurate as of: 17 11:59 PM.  Always use your most recent med list.                   Brand Name Dispense Instructions for use Diagnosis    ibuprofen 400 MG tablet    ADVIL/MOTRIN    120 tablet    Take 1-2 tablets (400-800 mg) by mouth every 6 hours as needed for other (cramping)    , delivered       norethindrone 0.35 MG per tablet    MICRONOR    84 tablet    Take 1 tablet (0.35 mg) by mouth daily    Encounter for initial prescription of contraceptive pills       PRENATAL DHA PO      Take 1 tablet by mouth daily    Supervision of high risk pregnancy in first trimester

## 2017-09-06 NOTE — PROGRESS NOTES
SUBJECTIVE:  Ani Aquino 31 year old year old female, , who presents for 2 week postpartum mood check.  Pt reports things have been going well during postpartum period. Denies any changes in mood, denies depression or anxiety. Pt has been pumping exclusively, denies any pain or issues. Feels like it is less stressful than worrying about latchDenies any pain, dysuria, constipation or perineal pain. Has progestin-only pills, has not started taking yet.  Is planning for FOB to have vasectomy.    OBJECTIVE:   /77  Wt 102.6 kg (226 lb 4.8 oz)  Breastfeeding? Yes  BMI 31.56 kg/m2  She appears well, afebrile.    Pelvic Exam:  Deferred    ASSESSMENT:   2 weeks postpartum after     PLAN:   Reviewed postpartum depression signs/symptoms, pt aware of resources in clinic.   Return to clinic in 4-6 weeks for routine postpartum exam

## 2017-09-06 NOTE — LETTER
Date:September 11, 2017      Patient was self referred, no letter generated. Do not send.        [facility] Health Information

## 2017-09-07 ASSESSMENT — ANXIETY QUESTIONNAIRES: GAD7 TOTAL SCORE: 1

## 2017-10-17 ENCOUNTER — OFFICE VISIT (OUTPATIENT)
Dept: OBGYN | Facility: CLINIC | Age: 32
End: 2017-10-17
Attending: ADVANCED PRACTICE MIDWIFE
Payer: COMMERCIAL

## 2017-10-17 VITALS
SYSTOLIC BLOOD PRESSURE: 126 MMHG | DIASTOLIC BLOOD PRESSURE: 83 MMHG | HEART RATE: 93 BPM | WEIGHT: 225.7 LBS | HEIGHT: 71 IN | BODY MASS INDEX: 31.6 KG/M2

## 2017-10-17 DIAGNOSIS — Z30.41 SURVEILLANCE OF CONTRACEPTIVE PILL: ICD-10-CM

## 2017-10-17 PROBLEM — O09.899 RUBELLA NON-IMMUNE STATUS, ANTEPARTUM: Status: RESOLVED | Noted: 2017-02-22 | Resolved: 2017-10-17

## 2017-10-17 PROBLEM — O60.00 PRETERM LABOR: Status: RESOLVED | Noted: 2017-08-21 | Resolved: 2017-10-17

## 2017-10-17 PROBLEM — O34.219 VBAC, DELIVERED: Status: RESOLVED | Noted: 2017-08-23 | Resolved: 2017-10-17

## 2017-10-17 PROBLEM — O36.63X0 LARGE FOR DATES AFFECTING MANAGEMENT OF MOTHER, THIRD TRIMESTER, NOT APPLICABLE OR UNSPECIFIED FETUS: Status: RESOLVED | Noted: 2017-08-08 | Resolved: 2017-10-17

## 2017-10-17 PROBLEM — E55.9 VITAMIN D DEFICIENCY: Status: RESOLVED | Noted: 2017-02-10 | Resolved: 2017-10-17

## 2017-10-17 PROBLEM — Z28.39 RUBELLA NON-IMMUNE STATUS, ANTEPARTUM: Status: RESOLVED | Noted: 2017-02-22 | Resolved: 2017-10-17

## 2017-10-17 RX ORDER — ACETAMINOPHEN AND CODEINE PHOSPHATE 120; 12 MG/5ML; MG/5ML
1 SOLUTION ORAL DAILY
Qty: 84 TABLET | Refills: 3 | Status: SHIPPED | OUTPATIENT
Start: 2017-10-17 | End: 2018-09-10

## 2017-10-17 NOTE — NURSING NOTE
Chief Complaint   Patient presents with     Post Partum Exam     SUBJECTIVE:   Ani Aquino is here for her 6-week postpartum checkup.     PHQ-9 score: 2  Hx of Abuse:  No    Delivery Date: 2017.    Delivering provider:  Reba Freire CNM.    Type of delivery:  .  Perineum:   tear, with repair.     Delivery complications: Hemorrhage > 500 cc, Prematurity (<37 wks) and Repetative variables (60x60)  Infant gender:  boy, weight 6 pounds 13 oz.  Feeding Method:  Bottlefed.  Complications reported with feeding:  none, infant thriving .    Bleeding:  Moderate.  Duration:  3 week.  Menses resumed:  Yes   Bowel/Urinary problems:  No    Contraception Planned:  vasectomy--current partner  She  has not had intercourse since delivery..

## 2017-10-17 NOTE — LETTER
10/17/2017       RE: Ani Aquino  [address] [address]     Dear Colleague,    Thank you for referring your patient, Ani Aquino, to the [facility] at [facility]. Please see a copy of my visit note below.    Nursing Notes:   Jayashree Garcia CMA  10/17/2017  8:53 AM  Signed  Chief Complaint   Patient presents with     Post Partum Exam     SUBJECTIVE:   Ani Aquino is here for her 6-week postpartum checkup.     PHQ-9 score: 2  Hx of Abuse:  No    Delivery Date: 2017.    Delivering provider:  Reba Freire CNM.    Type of delivery:  .  Perineum:   tear, with repair.     Delivery complications: Hemorrhage > 500 cc, Prematurity (<37 wks) and Repetative variables (60x60)  Infant gender:  boy, weight 6 pounds 13 oz.  Feeding Method:  Bottlefed.  Complications reported with feeding:  none, infant thriving .    Bleeding:  Moderate.  Duration:  3 week.  Menses resumed:  Yes   Bowel/Urinary problems:  No    Contraception Planned:  vasectomy--current partner  She  has not had intercourse since delivery..       ================================================================  Reports things going overall well in postpartum period.   Bleeding stopped at 3-4 weeks, has noticed occasional spotting since. No regular menses.   Pumping exclusively, no supplementation. Denies issues or pain.   Reports stable mood, denies depression or anxiety. Getting 2-4 hour stretches of sleep at night.  Denies dysuria, incontinence, constipation, hemorrhoids, or perineal pain.   Has not had intercourse since delivery. Using POPs for birth control, planning vasectomy for partner long-term.   Pap up to date, due in   ================================================================  ROS: 10 point ROS neg other than the symptoms noted above in the HPI.   CONSTITUTIONAL: negative  RESPIRATORY: negative  CARDIOVASCULAR: negative  : negative for dysuria and  "incontinence-stress  GI: negative for constipation, hemorrhoids  PSYCHIATRIC: negative, anxiety and depression    EXAM:  /83  Pulse 93  Ht 1.803 m (5' 10.98\")  Wt 102.4 kg (225 lb 11.2 oz)  BMI 31.49 kg/m2  General: healthy, alert and no distress  Psych: NEGATIVE, anxiety and depression  Last PHQ-9 score on record= No Value exists for the : HP#PHQ9  Breasts:  Nipples intact with no lesions  Abdomen: Benign, Soft, flat, non-tender, No masses, organomegaly and Diastasis less than 1-2 FB  Incision:  None   Vulva:  Normal genitalia and Bartholin's, Urethra, [place]'s normal  Vagina:  normal with good muscle tone  Cervix:  moist, closed, neg CMT.    Uterus:  fully involuted and non-tender    Adnexa:  Within normal limits and No masses, nodularity, tenderness  Recto-vaginal:   anus normal    Assessment:  Encounter Diagnosis   Name Primary?     Routine postpartum follow-up Yes      Normal postpartum exam after   Pregnancy was complicated by:   delivery, 3rd degree laceration.      Plan:  Contraception methods discussed. Medication instructions reviewed. Refill sent to pharmacy.  Signs and symptoms of postpartum depression/anxiety discussed.  Discussed calcium intake, vitamins and supplements including Vitamin D  Exercise encouraged  Follow up in 1 year for annual exam    Again, thank you for allowing me to participate in the care of your patient.      Sincerely,    JENNIFER Otero CNM      "

## 2017-10-17 NOTE — LETTER
Date:October 19, 2017      Patient was self referred, no letter generated. Do not send.        [facility] Information

## 2017-10-17 NOTE — MR AVS SNAPSHOT
After Visit Summary   10/17/2017    Ani Aquino    MRN: [id number]           Patient Information     Date Of Birth          1985        Visit Information        Provider Department      10/17/2017 9:00 AM Gatito Carson APRN CNM [facility]        Today's Diagnoses     Routine postpartum follow-up    -  1    Surveillance of contraceptive pill           Follow-ups after your visit        Follow-up notes from your care team     Return in about 1 year (around 10/17/2018).      Who to contact     Please call your clinic at [phone] to:    Ask questions about your health    Make or cancel appointments    Discuss your medicines    Learn about your test results    Speak to your doctor   If you have compliments or concerns about an experience at your clinic, or if you wish to file a complaint, please contact [facility] Patient Relations at [phone] or email us at [email or web address]         Additional Information About Your Visit        MyChart Information     [employer] gives you secure access to your electronic health record. If you see a primary care provider, you can also send messages to your care team and make appointments. If you have questions, please call your primary care clinic.  If you do not have a primary care provider, please call [phone] and they will assist you.      [employer] is an electronic gateway that provides easy, online access to your medical records. With [employer], you can request a clinic appointment, read your test results, renew a prescription or communicate with your care team.     To access your existing account, please contact your [facility] or call [phone] for assistance.        Care EveryWhere ID     This is your Care EveryWhere ID. This could be used by other organizations to access your [place] medical records  [id number]        Your Vitals Were     Pulse Height  "BMI (Body Mass Index)             93 1.803 m (5' 10.98\") 31.49 kg/m2          Blood Pressure from Last 3 Encounters:   10/17/17 126/83   09/06/17 115/77   08/25/17 109/66    Weight from Last 3 Encounters:   10/17/17 102.4 kg (225 lb 11.2 oz)   09/06/17 102.6 kg (226 lb 4.8 oz)   08/22/17 114.3 kg (252 lb)              Today, you had the following     No orders found for display         Today's Medication Changes          These changes are accurate as of: 10/17/17 10:02 AM.  If you have any questions, ask your nurse or doctor.               These medicines have changed or have updated prescriptions.        Dose/Directions    * norethindrone 0.35 MG per tablet   Commonly known as:  MICRONOR   This may have changed:  Another medication with the same name was added. Make sure you understand how and when to take each.   Used for:  Encounter for initial prescription of contraceptive pills        Dose:  1 tablet   Take 1 tablet (0.35 mg) by mouth daily   Quantity:  84 tablet   Refills:  1       * norethindrone 0.35 MG per tablet   Commonly known as:  MICRONOR   This may have changed:  You were already taking a medication with the same name, and this prescription was added. Make sure you understand how and when to take each.   Used for:  Surveillance of contraceptive pill   Changed by:  Gatito Carson APRN CNM        Dose:  1 tablet   Take 1 tablet (0.35 mg) by mouth daily   Quantity:  84 tablet   Refills:  3       * Notice:  This list has 2 medication(s) that are the same as other medications prescribed for you. Read the directions carefully, and ask your doctor or other care provider to review them with you.         Where to get your medicines      These medications were sent to [facility] [address]  [address] [address]     Phone:  [phone]     norethindrone 0.35 MG per tablet                Primary Care Provider    Physician No " Ref-Primary       NO REF-PRIMARY PHYSICIAN        Equal Access to Services     CHIQUIS CALDERON : Hadii nathaly ivey dimas Blandali, walaurenda aminaesauha, qazachta michaelmonicadigna antoine. So [facility] [phone].    ATENCIÓN: Si habla español, tiene a wiggins disposición servicios gratuitos de asistencia lingüística. Crescencioame al [phone].    We comply with applicable federal civil rights laws and [place] laws. We do not discriminate on the basis of race, color, national origin, age, disability, sex, sexual orientation, or gender identity.            Thank you!     Thank you for choosing [facility]  for your care. Our goal is always to provide you with excellent care. Hearing back from our patients is one way we can continue to improve our services. Please take a few minutes to complete the written survey that you may receive in the mail after your visit with us. Thank you!             Your Updated Medication List - Protect others around you: Learn how to safely use, store and throw away your medicines at www.disposemymeds.org.          This list is accurate as of: 10/17/17 10:02 AM.  Always use your most recent med list.                   Brand Name Dispense Instructions for use Diagnosis    ibuprofen 400 MG tablet    ADVIL/MOTRIN    120 tablet    Take 1-2 tablets (400-800 mg) by mouth every 6 hours as needed for other (cramping)    , delivered       * norethindrone 0.35 MG per tablet    MICRONOR    84 tablet    Take 1 tablet (0.35 mg) by mouth daily    Encounter for initial prescription of contraceptive pills       * norethindrone 0.35 MG per tablet    MICRONOR    84 tablet    Take 1 tablet (0.35 mg) by mouth daily    Surveillance of contraceptive pill       PRENATAL DHA PO      Take 1 tablet by mouth daily    Supervision of high risk pregnancy in first trimester       * Notice:  This list has 2 medication(s) that are the same as other medications prescribed for you.  Read the directions carefully, and ask your doctor or other care provider to review them with you.

## 2017-10-17 NOTE — PROGRESS NOTES
"Nursing Notes:   Jayashree Garcia [facility]  10/17/2017  8:53 AM  Signed  Chief Complaint   Patient presents with     Post Partum Exam     SUBJECTIVE:   Ani Aquino is here for her 6-week postpartum checkup.     PHQ-9 score: 2  Hx of Abuse:  No    Delivery Date: 2017.    Delivering provider:  Reba Freire CNM.    Type of delivery:  .  Perineum:   tear, with repair.     Delivery complications: Hemorrhage > 500 cc, Prematurity (<37 wks) and Repetative variables (60x60)  Infant gender:  boy, weight 6 pounds 13 oz.  Feeding Method:  Bottlefed.  Complications reported with feeding:  none, infant thriving .    Bleeding:  Moderate.  Duration:  3 week.  Menses resumed:  Yes   Bowel/Urinary problems:  No    Contraception Planned:  vasectomy--current partner  She  has not had intercourse since delivery..       ================================================================  Reports things going overall well in postpartum period.   Bleeding stopped at 3-4 weeks, has noticed occasional spotting since. No regular menses.   Pumping exclusively, no supplementation. Denies issues or pain.   Reports stable mood, denies depression or anxiety. Getting 2-4 hour stretches of sleep at night.  Denies dysuria, incontinence, constipation, hemorrhoids, or perineal pain.   Has not had intercourse since delivery. Using POPs for birth control, planning vasectomy for partner long-term.   Pap up to date, due in   ================================================================  ROS: 10 point ROS neg other than the symptoms noted above in the HPI.   CONSTITUTIONAL: negative  RESPIRATORY: negative  CARDIOVASCULAR: negative  : negative for dysuria and incontinence-stress  GI: negative for constipation, hemorrhoids  PSYCHIATRIC: negative, anxiety and depression    EXAM:  /83  Pulse 93  Ht 1.803 m (5' 10.98\")  Wt 102.4 kg (225 lb 11.2 oz)  BMI 31.49 kg/m2  General: healthy, alert and no distress  Psych: NEGATIVE, anxiety and " depression  Last PHQ-9 score on record= No Value exists for the : HP#PHQ9  Breasts:  Nipples intact with no lesions  Abdomen: Benign, Soft, flat, non-tender, No masses, organomegaly and Diastasis less than 1-2 FB  Incision:  None   Vulva:  Normal genitalia and Bartholin's, Urethra, [place]'s normal  Vagina:  normal with good muscle tone  Cervix:  moist, closed, neg CMT.    Uterus:  fully involuted and non-tender    Adnexa:  Within normal limits and No masses, nodularity, tenderness  Recto-vaginal:   anus normal    Assessment:  Encounter Diagnosis   Name Primary?     Routine postpartum follow-up Yes      Normal postpartum exam after   Pregnancy was complicated by:   delivery, 3rd degree laceration.      Plan:  Contraception methods discussed. Medication instructions reviewed. Refill sent to pharmacy.  Signs and symptoms of postpartum depression/anxiety discussed.  Discussed calcium intake, vitamins and supplements including Vitamin D  Exercise encouraged  Follow up in 1 year for annual exam

## 2018-01-07 ENCOUNTER — HEALTH MAINTENANCE LETTER (OUTPATIENT)
Age: 33
End: 2018-01-07

## 2018-03-03 ENCOUNTER — MYC MEDICAL ADVICE (OUTPATIENT)
Dept: OBGYN | Facility: CLINIC | Age: 33
End: 2018-03-03

## 2018-03-12 RX ORDER — BREAST PUMP
1 EACH MISCELLANEOUS DAILY PRN
Qty: 1 EACH | Refills: 0 | Status: SHIPPED | OUTPATIENT
Start: 2018-03-12 | End: 2024-03-06

## 2018-07-10 ENCOUNTER — OFFICE VISIT - HEALTHEAST (OUTPATIENT)
Dept: FAMILY MEDICINE | Facility: CLINIC | Age: 33
End: 2018-07-10

## 2018-07-10 DIAGNOSIS — L98.9 SKIN LESION OF FOOT: ICD-10-CM

## 2018-07-10 DIAGNOSIS — R21 SKIN MACULE OR MACULAR RASH: ICD-10-CM

## 2018-07-10 ASSESSMENT — MIFFLIN-ST. JEOR: SCORE: 1798.25

## 2018-09-10 DIAGNOSIS — Z30.41 SURVEILLANCE OF CONTRACEPTIVE PILL: ICD-10-CM

## 2018-09-10 RX ORDER — NORETHINDRONE 0.35 MG
KIT ORAL
Qty: 84 TABLET | Refills: 3 | Status: SHIPPED | OUTPATIENT
Start: 2018-09-10

## 2019-05-23 ENCOUNTER — OFFICE VISIT (OUTPATIENT)
Dept: OBGYN | Facility: CLINIC | Age: 34
End: 2019-05-23
Attending: ADVANCED PRACTICE MIDWIFE
Payer: COMMERCIAL

## 2019-05-23 VITALS
DIASTOLIC BLOOD PRESSURE: 84 MMHG | BODY MASS INDEX: 34.79 KG/M2 | WEIGHT: 243 LBS | HEART RATE: 76 BPM | HEIGHT: 70 IN | SYSTOLIC BLOOD PRESSURE: 126 MMHG

## 2019-05-23 DIAGNOSIS — Z00.00 ANNUAL PHYSICAL EXAM: Primary | ICD-10-CM

## 2019-05-23 PROCEDURE — G0463 HOSPITAL OUTPT CLINIC VISIT: HCPCS | Mod: ZF

## 2019-05-23 PROCEDURE — G0145 SCR C/V CYTO,THINLAYER,RESCR: HCPCS | Performed by: ADVANCED PRACTICE MIDWIFE

## 2019-05-23 PROCEDURE — 87624 HPV HI-RISK TYP POOLED RSLT: CPT | Performed by: ADVANCED PRACTICE MIDWIFE

## 2019-05-23 ASSESSMENT — ANXIETY QUESTIONNAIRES
6. BECOMING EASILY ANNOYED OR IRRITABLE: SEVERAL DAYS
2. NOT BEING ABLE TO STOP OR CONTROL WORRYING: NOT AT ALL
1. FEELING NERVOUS, ANXIOUS, OR ON EDGE: NOT AT ALL
5. BEING SO RESTLESS THAT IT IS HARD TO SIT STILL: NOT AT ALL
7. FEELING AFRAID AS IF SOMETHING AWFUL MIGHT HAPPEN: NOT AT ALL
3. WORRYING TOO MUCH ABOUT DIFFERENT THINGS: NOT AT ALL
GAD7 TOTAL SCORE: 1

## 2019-05-23 ASSESSMENT — MIFFLIN-ST. JEOR: SCORE: 1897.49

## 2019-05-23 ASSESSMENT — PATIENT HEALTH QUESTIONNAIRE - PHQ9: 5. POOR APPETITE OR OVEREATING: NOT AT ALL

## 2019-05-23 NOTE — LETTER
2019       RE: Ani Aquino  [address] [facility] [address]     Dear Colleague,    Thank you for referring your patient, Ani Aquino, to the [facility] at [facility]. Please see a copy of my visit note below.    SUBJECTIVE:  Ani Aquino is an 33 year old  Female, , who requests an Annual GYN Preventive Health Care Exam. Ani is a . , in mutually monogamous relationship. States no concern regarding STIs.     Concerns to day include: considering pregnancy. Not currently using contraception. LMP 19. Was taking micronor but having periods twice per month. Lasting 4-5 days, heavy. Stopped micronor mid march. Has not started PNV.     Last PAP: 2016.   Abnormal pap history: one abnormal pap      Mammogram: screening mammogram age 37 - mother with hx of breast CA    HISTORY:  Prescription Medications as of 2019       Rx Number Disp Refills Start End Last Dispensed Date Next Fill Date Owning Pharmacy    Docosahexaenoic Acid (PRENATAL DHA PO)            Sig: Take 1 tablet by mouth daily    Class: Historical    Route: Oral    DEBLITANE 0.35 MG per tablet  84 tablet 3 9/10/2018    [facility] [place], [place] - [address]    Sig: TAKE ONE TABLET BY MOUTH EVERY DAY    Class: E-Prescribe    ibuprofen (ADVIL/MOTRIN) 400 MG tablet  120 tablet 0 2017    [place], [place] - [address]    Sig: Take 1-2 tablets (400-800 mg) by mouth every 6 hours as needed for other (cramping)    Class: E-Prescribe    Route: Oral    Misc. Devices (BREAST PUMP) MISC  1 each 0 3/12/2018    [facility]    Si each daily as needed    Class: Local Print    Route: Does not apply    norethindrone (MICRONOR) 0.35 MG per tablet  84 tablet 1 2017    [facility] [address]    Sig: Take 1 tablet (0.35 mg)  by mouth daily    Class: E-Prescribe    Route: Oral        No Known Allergies  Immunization History   Administered Date(s) Administered     Influenza Vaccine IM 3yrs+ 4 Valent IIV4 2017     MMR 2017       OB History    Para Term  AB Living   2 2 1 1 0 1   SAB TAB Ectopic Multiple Live Births   0 0 0 0 2      # Outcome Date GA Lbr Wilbur/2nd Weight Sex Delivery Anes PTL Lv   2  17 35w1d 02:54 / 01:44 3.09 kg (6 lb 13 oz) M  EPI Y YVAN      Complications: Fetal Intolerance      Name: KALEY DAY      Apgar1: 8  Apgar5: 9   1 Term 13 37w0d  3.345 kg (7 lb 6 oz) F CS-LTranv  N LIVE BIRTH      Name: Megan     Past Medical History:   Diagnosis Date     Abnormal Pap smear      Irregular heart beat 2016    has had stress test, echo and holtor monitoring.       Past Surgical History:   Procedure Laterality Date      SECTION      low transverse     Family History   Problem Relation Age of Onset     Diabetes Maternal Aunt      Diabetes Maternal Uncle      Coronary Artery Disease Paternal Grandmother      Other Cancer Paternal Grandmother      Coronary Artery Disease Paternal Grandfather      Hypertension Father      Breast Cancer Mother 40     Depression Maternal Grandfather      Other Cancer Maternal Grandfather      Asthma Maternal Grandfather      Osteoporosis Maternal Grandfather      Social History     Socioeconomic History     Marital status:      Spouse name: Moody     Number of children: None     Years of education: None     Highest education level: None   Occupational History     Occupation: teacher     Employer: [employer]   Social Needs     Financial resource strain: None     Food insecurity:     Worry: None     Inability: None     Transportation needs:     Medical: None     Non-medical: None   Tobacco Use     Smoking status: Never Smoker     Smokeless tobacco: Never Used   Substance and Sexual Activity     Alcohol use: No     Alcohol/week: 0.0 oz  "    Drug use: No     Sexual activity: Yes     Partners: Male     Birth control/protection: None   Lifestyle     Physical activity:     Days per week: None     Minutes per session: None     Stress: None   Relationships     Social connections:     Talks on phone: None     Gets together: None     Attends Taoism service: None     Active member of club or organization: None     Attends meetings of clubs or organizations: None     Relationship status: None     Intimate partner violence:     Fear of current or ex partner: None     Emotionally abused: None     Physically abused: None     Forced sexual activity: None   Other Topics Concern      Service No     Blood Transfusions No     Caffeine Concern Yes     Comment: 1 soda     Occupational Exposure No     Hobby Hazards No     Sleep Concern No     Stress Concern No     Weight Concern No     Special Diet No     Back Care No     Exercise Yes     Comment: every day 30 mintues     Bike Helmet Yes     Seat Belt Yes     Self-Exams No   Social History Narrative    How much exercise per week? 30 min a day    How much calcium per day? In prenatals  And dairy       How much caffeine per day? 1 can soda    How much vitamin D per day? pernatals    Do you/your family wear seatbelts?  No    Do you/your family use safety helmets? Yes    Do you/your family use sunscreen? Yes    Do you/your family keep firearms in the home? No    Do you/your family have a smoke detector(s)? Yes           REVIEW of SYSTEMS:  General: none  Head/Eyes: none  Ears/Nose/Throat: none  Cardiovascular: none  Respiratory: none  Gastrointestinal: none  Breast: none  Genitourinary: none  Sexual Function: none  Musculoskeletal: none  Skin: none  Neurological: none  Endocrine: none  Mental Health: none  Today's PHQ-9: 3 FRANCESCO -7: 1    EXAM:  Blood pressure 126/84, pulse 76, height 1.778 m (5' 10\"), weight 110.2 kg (243 lb), not currently breastfeeding. Body mass index is 34.87 kg/m .  General - pleasant female " in no acute distress.  Skin - no suspicious lesions or rashes  EENT-  PERRLA, euthyroid with out palpable nodules  Neck - supple without lymphadenopathy.  Lungs - clear to auscultation bilaterally.  Heart - regular rate and rhythm without murmur.  Abdomen - soft, nontender, nondistended, no masses or organomegaly noted.  Musculoskeletal - no gross deformities.  Neurological - normal strength, sensation, and mental status.    Breast Exam:  Breast: Without visible skin changes. No dimpling or lesions seen.   Breasts supple, non-tender with palpation, no dominant mass, nodularity, or nipple discharge noted bilaterally. Axillary nodes negative.         PELVIC EXAM:  EG/BUS: Normal genital architecture without lesions, erythema or abnormal secretions. Bartholin's, Urethra, [place]'s glands are normal.   Vagina: moist, pink, rugae with creamy, white and odorless secretions  Cervix: pink, moist, closed, without lesion or CMT  Uterus: midposition, and small, smooth, firm, mobile w/o pain  Adnexa: Within normal limits and No masses, nodularity, tenderness  Rectum:anus normal    ASSESSMENT:  33 year old Female Annual Preventive Care Exam       PLAN:   - Genprobe offered: declined.   Orders Placed This Encounter   Procedures     Pap imaged thin layer screen with HPV - recommended age 30 - 65 years (select HPV order below)     Pap imaged thin layer screen with HPV - recommended age 30 - 65 years (select HPV order below)    Answer REQUIRED pap questions below:    LMP (date):  No LMP recorded. (Menstrual status: Irregular Periods).  PAP SOURCE:  Cervical - Endocervical  PREVIOUS PAP RESULTS:  No results found for: PAP  PERTINENT CLINICAL HISTORY:  NONE     Order Specific Question:   Clinical indications for testing     Answer:   Screening     HPV High Risk Types DNA Cervical     Lipid Profile     Standing Status:   Future     Standing Expiration Date:   5/23/2020     Order Specific Question:   Perform labs while fasting or  non-fasting?     Answer:   Fasting     TSH with free T4 reflex     Last Lab Result: No results found for: TSH     Standing Status:   Future     Standing Expiration Date:   5/23/2020     25- [place]-Vitamin D     Standing Status:   Future     Standing Expiration Date:   5/23/2020     Current Outpatient Medications   Medication Sig Dispense Refill     ibuprofen (ADVIL/MOTRIN) 400 MG tablet Take 1-2 tablets (400-800 mg) by mouth every 6 hours as needed for other (cramping) (Patient not taking: Reported on 9/6/2017) 120 tablet 0     - Encouraged starting PNV  - Encouraged continued exercise, healthy diet choices.   - Discussed risks/benefits and treatment options of prescribed medications and/or other treatment modalities.    Return in one year/PRN for preventive care or problems/concerns.     Verbalized understanding and agreement with visit plan. JENNIFER Willis CNM      Again, thank you for allowing me to participate in the care of your patient.      Sincerely,    JENNIFER Willis CNM

## 2019-05-23 NOTE — LETTER
Date:May 24, 2019      Patient was self referred, no letter generated. Do not send.        [facility] Information

## 2019-05-23 NOTE — PROGRESS NOTES
SUBJECTIVE:  Ani Aquino is an 33 year old  Female, , who requests an Annual GYN Preventive Health Care Exam. Ani is a . , in mutually monogamous relationship. States no concern regarding STIs.     Concerns to day include: considering pregnancy. Not currently using contraception. LMP 19. Was taking micronor but having periods twice per month. Lasting 4-5 days, heavy. Stopped micronor mid march. Has not started PNV.     Last PAP: 2016.   Abnormal pap history: one abnormal pap      Mammogram: screening mammogram age 37 - mother with hx of breast CA    HISTORY:  Prescription Medications as of 2019       Rx Number Disp Refills Start End Last Dispensed Date Next Fill Date Owning Pharmacy    Docosahexaenoic Acid (PRENATAL DHA PO)            Sig: Take 1 tablet by mouth daily    Class: Historical    Route: Oral    DEBLITANE 0.35 MG per tablet  84 tablet 3 9/10/2018    [place], [place] - [address]    Sig: TAKE ONE TABLET BY MOUTH EVERY DAY    Class: E-Prescribe    ibuprofen (ADVIL/MOTRIN) 400 MG tablet  120 tablet 0 2017    [place], [place] - [address]    Sig: Take 1-2 tablets (400-800 mg) by mouth every 6 hours as needed for other (cramping)    Class: E-Prescribe    Route: Oral    Misc. Devices (BREAST PUMP) MISC  1 each 0 3/12/2018    [facility]    Si each daily as needed    Class: Local Print    Route: Does not apply    norethindrone (MICRONOR) 0.35 MG per tablet  84 tablet 1 2017    [place], [place] - [address]    Sig: Take 1 tablet (0.35 mg) by mouth daily    Class: E-Prescribe    Route: Oral        No Known Allergies  Immunization History   Administered Date(s) Administered     Influenza Vaccine IM 3yrs+ 4 Valent IIV4 2017     MMR 2017       OB History    Para Term  AB Living   2 2 1 1 0  1   SAB TAB Ectopic Multiple Live Births   0 0 0 0 2      # Outcome Date GA Lbr Wilbur/2nd Weight Sex Delivery Anes PTL Lv   2  17 35w1d 02:54 / 01:44 3.09 kg (6 lb 13 oz) M  EPI Y YVAN      Complications: Fetal Intolerance      Name: KALEY DAY      Apgar1: 8  Apgar5: 9   1 Term 13 37w0d  3.345 kg (7 lb 6 oz) F CS-LTranv  N LIVE BIRTH      [facility]     Past Medical History:   Diagnosis Date     Abnormal Pap smear      Irregular heart beat 2016    has had stress test, echo and holtor monitoring.       Past Surgical History:   Procedure Laterality Date      SECTION      low transverse     Family History   Problem Relation Age of Onset     Diabetes Maternal Aunt      Diabetes Maternal Uncle      Coronary Artery Disease Paternal Grandmother      Other Cancer Paternal Grandmother      Coronary Artery Disease Paternal Grandfather      Hypertension Father      Breast Cancer Mother 40     Depression Maternal Grandfather      Other Cancer Maternal Grandfather      Asthma Maternal Grandfather      Osteoporosis Maternal Grandfather      Social History     Socioeconomic History     Marital status:      Spouse name: Moody     Number of children: None     Years of education: None     Highest education level: None   Occupational History     Occupation: teacher     Employer: [employer]   Social Needs     Financial resource strain: None     Food insecurity:     Worry: None     Inability: None     Transportation needs:     Medical: None     Non-medical: None   Tobacco Use     Smoking status: Never Smoker     Smokeless tobacco: Never Used   Substance and Sexual Activity     Alcohol use: No     Alcohol/week: 0.0 oz     Drug use: No     Sexual activity: Yes     Partners: Male     Birth control/protection: None   Lifestyle     Physical activity:     Days per week: None     Minutes per session: None     Stress: None   Relationships     Social connections:     Talks on phone: None     Gets  "together: None     Attends Confucianism service: None     Active member of club or organization: None     Attends meetings of clubs or organizations: None     Relationship status: None     Intimate partner violence:     Fear of current or ex partner: None     Emotionally abused: None     Physically abused: None     Forced sexual activity: None   Other Topics Concern      Service No     Blood Transfusions No     Caffeine Concern Yes     Comment: 1 soda     Occupational Exposure No     Hobby Hazards No     Sleep Concern No     Stress Concern No     Weight Concern No     Special Diet No     Back Care No     Exercise Yes     Comment: every day 30 mintues     Bike Helmet Yes     Seat Belt Yes     Self-Exams No   Social History Narrative    How much exercise per week? 30 min a day    How much calcium per day? In prenatals  And dairy       How much caffeine per day? 1 can soda    How much vitamin D per day? pernatals    Do you/your family wear seatbelts?  No    Do you/your family use safety helmets? Yes    Do you/your family use sunscreen? Yes    Do you/your family keep firearms in the home? No    Do you/your family have a smoke detector(s)? Yes           REVIEW of SYSTEMS:  General: none  Head/Eyes: none  Ears/Nose/Throat: none  Cardiovascular: none  Respiratory: none  Gastrointestinal: none  Breast: none  Genitourinary: none  Sexual Function: none  Musculoskeletal: none  Skin: none  Neurological: none  Endocrine: none  Mental Health: none  Today's PHQ-9: 3 FRANCESCO -7: 1    EXAM:  Blood pressure 126/84, pulse 76, height 1.778 m (5' 10\"), weight 110.2 kg (243 lb), not currently breastfeeding. Body mass index is 34.87 kg/m .  General - pleasant female in no acute distress.  Skin - no suspicious lesions or rashes  EENT-  PERRLA, euthyroid with out palpable nodules  Neck - supple without lymphadenopathy.  Lungs - clear to auscultation bilaterally.  Heart - regular rate and rhythm without murmur.  Abdomen - soft, nontender, " nondistended, no masses or organomegaly noted.  Musculoskeletal - no gross deformities.  Neurological - normal strength, sensation, and mental status.    Breast Exam:  Breast: Without visible skin changes. No dimpling or lesions seen.   Breasts supple, non-tender with palpation, no dominant mass, nodularity, or nipple discharge noted bilaterally. Axillary nodes negative.         PELVIC EXAM:  EG/BUS: Normal genital architecture without lesions, erythema or abnormal secretions. Bartholin's, Urethra, [place]'s glands are normal.   Vagina: moist, pink, rugae with creamy, white and odorless secretions  Cervix: pink, moist, closed, without lesion or CMT  Uterus: midposition, and small, smooth, firm, mobile w/o pain  Adnexa: Within normal limits and No masses, nodularity, tenderness  Rectum:anus normal    ASSESSMENT:  33 year old Female Annual Preventive Care Exam       PLAN:   - Genprobe offered: declined.   Orders Placed This Encounter   Procedures     Pap imaged thin layer screen with HPV - recommended age 30 - 65 years (select HPV order below)     Pap imaged thin layer screen with HPV - recommended age 30 - 65 years (select HPV order below)    Answer REQUIRED pap questions below:    LMP (date):  No LMP recorded. (Menstrual status: Irregular Periods).  PAP SOURCE:  Cervical - Endocervical  PREVIOUS PAP RESULTS:  No results found for: PAP  PERTINENT CLINICAL HISTORY:  NONE     Order Specific Question:   Clinical indications for testing     Answer:   Screening     HPV High Risk Types DNA Cervical     Lipid Profile     Standing Status:   Future     Standing Expiration Date:   5/23/2020     Order Specific Question:   Perform labs while fasting or non-fasting?     Answer:   Fasting     TSH with free T4 reflex     Last Lab Result: No results found for: TSH     Standing Status:   Future     Standing Expiration Date:   5/23/2020     25- [place]-Vitamin D     Standing Status:   Future     Standing Expiration Date:   5/23/2020      Current Outpatient Medications   Medication Sig Dispense Refill     ibuprofen (ADVIL/MOTRIN) 400 MG tablet Take 1-2 tablets (400-800 mg) by mouth every 6 hours as needed for other (cramping) (Patient not taking: Reported on 9/6/2017) 120 tablet 0     - Encouraged starting PNV  - Encouraged continued exercise, healthy diet choices.   - Discussed risks/benefits and treatment options of prescribed medications and/or other treatment modalities.    Return in one year/PRN for preventive care or problems/concerns.     Verbalized understanding and agreement with visit plan. JENNIFER WillisM

## 2019-05-24 ASSESSMENT — ANXIETY QUESTIONNAIRES: GAD7 TOTAL SCORE: 1

## 2019-05-29 LAB
COPATH REPORT: ABNORMAL
PAP: ABNORMAL

## 2019-05-30 LAB
FINAL DIAGNOSIS: NORMAL
HPV HR 12 DNA CVX QL NAA+PROBE: NEGATIVE
HPV16 DNA SPEC QL NAA+PROBE: NEGATIVE
HPV18 DNA SPEC QL NAA+PROBE: NEGATIVE
SPECIMEN DESCRIPTION: NORMAL
SPECIMEN SOURCE CVX/VAG CYTO: NORMAL

## 2019-06-16 NOTE — LETTER
2017        Ani Aquino  [address] [facility] [address]    To Whom it May Concern:    Ani Aquino had a Vaginal Birth After  () on 17.  Due to infant prematurity and need for additional breastfeeding resources we recommend return to work no earlier than 12 weeks from today and only after medical clearance from our Obstetric providers.     Please contact us at [phone] should you require additional information or clarification.     Sincerely,        JENNIFER Garnica CNM           no fever/no chills/no dizziness/no tingling/no weakness

## 2020-02-11 NOTE — DISCHARGE SUMMARY
"TRIAGE DISCHARGE NOTE  ===================    CHIEF COMPLAINT  ========================  Ani Aquino is a 31 year old patient presenting today at 34w6d for evaluation of uterine contractions, vaginal bleeding.    No LMP recorded. Patient is pregnant.  Estimated Date of Delivery: Sep 26, 2017     HPI  ==================   Pt presented to BP for evaluation of contractions and vaginal bleeding.  No bleeding noted on speculum exam. Labs completed and within normal limits. Pt has continued to have irregular contractions lasting 20 secs.     EXAM  ============  /71  Pulse 95  Temp 98.4  F (36.9  C) (Oral)  Resp 16  Ht 1.803 m (5' 11\")  Wt 114.3 kg (252 lb)  BMI 35.15 kg/m2    CONTRACTIONS: irreg, mild and every 2-6 minutes; short in duration  FETAL HEART TONES: continuous EFM- baseline 130 with moderate variability and positive accelerations. No decelerations.  NST: REACTIVE  EFW: 7 lbs, difficult to assess due to habitus    PELVIC EXAM: 2/ long/ Posterior/ -4, unchanged from previous exam 1.5 hours ago  BACON SCORE: n/a  PRESENTATION: VERTEX  BLOOD: no  DISCHARGE: tan discharge    ROM: no  AMNISURE: not done    LABS: UA, UC, Wet prep and GC/ Chlamydia, abruption labs  Lab results reviewed- wet prep and abruption labs within normal limits   UC and GC/CT pending    DIAGNOSIS  ============  34w6d seen on the Birthplace Triage not in  labor  NST: REACTIVE  Fetal Heart rate tracing:category one    PLAN  ============  Encouraged rest and hydration at home. Thoroughly reviewed precautions for when to return/call.  Discharge to home with PTL instructions per discharge instruction form  Call or return to the Birthplace with contractions, cramping, abdominal or pelvic pain, vaginal bleeding, leaking fluid or decreased fetal movement.  Follow up at your next clinic visit- clinic RNs will call to schedule earlier appt.  Reviewed plan with Dr. Harrison, agrees with plan.     JENNIFER Otero CNM  " No

## 2020-03-10 ENCOUNTER — HEALTH MAINTENANCE LETTER (OUTPATIENT)
Age: 35
End: 2020-03-10

## 2020-12-27 ENCOUNTER — HEALTH MAINTENANCE LETTER (OUTPATIENT)
Age: 35
End: 2020-12-27

## 2021-01-28 NOTE — LETTER
Date:March 23, 2017      Patient was self referred, no letter generated. Do not send.        [facility] Health Information       (0) indicator not present

## 2021-04-24 ENCOUNTER — HEALTH MAINTENANCE LETTER (OUTPATIENT)
Age: 36
End: 2021-04-24

## 2021-06-01 VITALS — BODY MASS INDEX: 30.9 KG/M2 | HEIGHT: 71 IN | WEIGHT: 220.7 LBS

## 2021-06-19 NOTE — PROGRESS NOTES
Assessment/Plan:      1. Skin lesion of foot  Ambulatory referral to Dermatology   2. Skin macule or macular rash  Ambulatory referral to Dermatology     Unclear etiology of the macular skin lesion of her foot.  Without other symptoms that are present it is unclear what to specifically test for.  I did discuss possible biopsy with the patient here in clinic versus going to dermatology and she would prefer to go to dermatology for further evaluation.  I am suspicious for possible autoimmune disorder however without other systemic symptoms will defer blood work at this time.  I discussed the patient to monitor for other skin lesions or other systemic symptoms that may be occurring.  Otherwise plan on further workup with dermatology.  She is in agreement with this plan.    Subjective:      Ani Aquino is a 32 y.o. female who presents for concerns of skin color change on feet.  She is a new patient to [facility].  These are not raised, non-itchy, overall not bothersome. First noticed 3-4 months ago. Initially thought it was a bruise from kicking a ball. Did not resolve then noticed other spots that were present on both feet. She has not seen anyone for this previously.  She has no significant past medical history.  She has had 2 previous pregnancies she has a 5-year-old and a 10-month-old.  She is on progesterone only pills.  She does not take any additional supplements or other medications.  She denies any other rashes or easy bruising.  She denies any fever, fatigue, body aches, or other systemic symptoms.  No family history of autoimmune disorders.  She comes in today wanting to have this evaluated as it is new.     The following portions of the patient's history were reviewed and updated as appropriate: allergies, current medications, past family history, past medical history, past social history, past surgical history and problem list.  History reviewed. No pertinent past medical history.  Past Surgical  "History:   Procedure Laterality Date      SECTION, CLASSIC       Social History     Social History     Marital status:      Spouse name: N/A     Number of children: N/A     Years of education: N/A     Occupational History     Not on file.     Social History Main Topics     Smoking status: Never Smoker     Smokeless tobacco: Never Used     Alcohol use Not on file      Comment: rare     Drug use: No     Sexual activity: Yes     Partners: Male     Other Topics Concern     Not on file     Social History Narrative     No narrative on file     Current Outpatient Prescriptions   Medication Sig Note     norethindrone (MICRONOR) 0.35 mg tablet Take 0.35 mg by mouth. 7/10/2018: Received from: [facility] Received Sig: Take 1 tablet (0.35 mg) by mouth daily     Family History   Problem Relation Age of Onset     Breast cancer Mother 38     Hypertension Father      Hyperlipidemia Father      Diabetes Maternal Grandmother      Heart disease Paternal Grandmother      Hypertension Paternal Grandmother      Heart disease Paternal Grandfather      Hypertension Paternal Grandfather      Stroke Neg Hx      Clotting disorder Neg Hx        Review of Systems   Pertinent items are noted in HPI.      Objective:      /82 (Patient Site: Right Arm, Patient Position: Sitting, Cuff Size: Adult Large)  Pulse 68  Resp 16  Ht 5' 10.75\" (1.797 m)  Wt 220 lb 11.2 oz (100.1 kg)  LMP 2018 (Approximate)  Breastfeeding? Yes  BMI 31 kg/m2    General appearance: alert, appears stated age and cooperative  Head: Normocephalic, without obvious abnormality, atraumatic  Lungs: clear to auscultation bilaterally  Heart: regular rate and rhythm, S1, S2 normal, no murmur, click, rub or gallop  Skin: Skin color, texture, turgor normal. No rashes or lesions or positive for macular skin lesion on bilateral feet x4. Size varies largest is approximately 4cm in diameter. Lesions are not round. Boarders are smooth. No petechia is present. " skin color change is blanchable. No pain is present. No erythemia is present. No papular lesions present.   Neurologic: Grossly normal

## 2021-08-07 ENCOUNTER — OFFICE VISIT (OUTPATIENT)
Dept: FAMILY MEDICINE | Facility: CLINIC | Age: 36
End: 2021-08-07
Payer: COMMERCIAL

## 2021-08-07 VITALS
TEMPERATURE: 98.4 F | RESPIRATION RATE: 18 BRPM | OXYGEN SATURATION: 98 % | HEART RATE: 82 BPM | DIASTOLIC BLOOD PRESSURE: 86 MMHG | SYSTOLIC BLOOD PRESSURE: 132 MMHG

## 2021-08-07 DIAGNOSIS — R59.1 LA (LYMPHADENOPATHY): Primary | ICD-10-CM

## 2021-08-07 DIAGNOSIS — J02.0 STREPTOCOCCAL SORE THROAT: ICD-10-CM

## 2021-08-07 LAB
DEPRECATED S PYO AG THROAT QL EIA: NEGATIVE
GROUP A STREP BY PCR: NOT DETECTED

## 2021-08-07 PROCEDURE — 99203 OFFICE O/P NEW LOW 30 MIN: CPT | Performed by: NURSE PRACTITIONER

## 2021-08-07 PROCEDURE — 87651 STREP A DNA AMP PROBE: CPT | Performed by: NURSE PRACTITIONER

## 2021-08-07 NOTE — PROGRESS NOTES
Assessment & Plan     LA (lymphadenopathy)    - Streptococcus A Rapid Screen w/Reflex to PCR - Clinic Collect    Sore throat    - Streptococcus A Rapid Screen w/Reflex to PCR - Clinic Collect    Intermittent ear pain and sore throat.  Ears are normal today.  Strep test negative.  Symptoms have been intermittent for about a week.             No follow-ups on file.    Serene Watts CNP  [facility] RAMA Law is a 35 year old female who presents to clinic today for the following health issues:  Chief Complaint   Patient presents with     Otalgia     ER PAIN STARTED LAST WEEK      HPI    Intermittent bilateral ear pain and sore throat.  Is feeling pain specifically behind the ears.  No recent URI.  Mild postnasal drainage.  No known strep exposure.  Pain off and on for the last week.        Review of Systems   All other systems reviewed and are negative.          Objective    /86 (BP Location: Right arm, Patient Position: Sitting, Cuff Size: Adult Large)   Pulse 82   Temp 98.4  F (36.9  C)   Resp 18   SpO2 98%   Physical Exam  Constitutional:       General: She is not in acute distress.     Appearance: She is well-developed.   HENT:      Mouth/Throat:      Pharynx: Posterior oropharyngeal erythema (Purplish pink) present.      Tonsils: No tonsillar abscesses. 1+ on the right. 1+ on the left.   Eyes:      General:         Right eye: No discharge.         Left eye: No discharge.      Conjunctiva/sclera: Conjunctivae normal.   Pulmonary:      Effort: Pulmonary effort is normal.   Musculoskeletal:         General: Normal range of motion.   Skin:     General: Skin is warm and dry.      Capillary Refill: Capillary refill takes less than 2 seconds.   Neurological:      Mental Status: She is alert and oriented to person, place, and time.   Psychiatric:         Mood and Affect: Mood normal.         Behavior: Behavior normal.         Thought Content: Thought content normal.          Judgment: Judgment normal.            Results for orders placed or performed in visit on 08/07/21 (from the past 24 hour(s))   Streptococcus A Rapid Screen w/Reflex to PCR - Clinic Collect    Specimen: Throat; Swab   Result Value Ref Range    Group A Strep antigen Negative Negative

## 2021-08-07 NOTE — PATIENT INSTRUCTIONS
Ears look okay today.  Strep is negative.    Recheck if pain is more persistent or you develop a fever over 100.4.

## 2021-10-09 ENCOUNTER — HEALTH MAINTENANCE LETTER (OUTPATIENT)
Age: 36
End: 2021-10-09

## 2022-02-17 PROBLEM — O09.91 SUPERVISION OF HIGH RISK PREGNANCY IN FIRST TRIMESTER: Status: RESOLVED | Noted: 2017-02-08 | Resolved: 2017-10-17

## 2022-03-18 ENCOUNTER — TELEPHONE (OUTPATIENT)
Dept: FAMILY MEDICINE | Facility: CLINIC | Age: 37
End: 2022-03-18
Payer: COMMERCIAL

## 2022-03-18 NOTE — TELEPHONE ENCOUNTER
Writer called and LM on VM that appointment was scheduled at 1000 on 3/30 for her and her previous appointment was canceled.

## 2022-03-18 NOTE — TELEPHONE ENCOUNTER
Reason for Call:  Same Day Appointment, Requested Provider:  anyone    PCP: No Ref-Primary, Physician    Reason for visit: establish care    Duration of symptoms: n/a    Have you been treated for this in the past? Yes    Additional comments: would like to be seen 3/30-4/1/22 due to time off    Can we leave a detailed message on this number? YES    Phone number patient can be reached at: Home number on file [phone] (home)    Best Time: anytime    Call taken on 3/18/2022 at 2:04 PM by Phuong Viveros

## 2022-03-30 ENCOUNTER — OFFICE VISIT (OUTPATIENT)
Dept: FAMILY MEDICINE | Facility: CLINIC | Age: 37
End: 2022-03-30
Payer: COMMERCIAL

## 2022-03-30 VITALS
WEIGHT: 250 LBS | HEART RATE: 64 BPM | DIASTOLIC BLOOD PRESSURE: 68 MMHG | OXYGEN SATURATION: 96 % | RESPIRATION RATE: 18 BRPM | TEMPERATURE: 98.3 F | BODY MASS INDEX: 35.79 KG/M2 | SYSTOLIC BLOOD PRESSURE: 122 MMHG | HEIGHT: 70 IN

## 2022-03-30 DIAGNOSIS — M79.89 LEG SWELLING: ICD-10-CM

## 2022-03-30 DIAGNOSIS — R76.8 POSITIVE ANA (ANTINUCLEAR ANTIBODY): ICD-10-CM

## 2022-03-30 DIAGNOSIS — R21 SKIN RASH: Primary | ICD-10-CM

## 2022-03-30 DIAGNOSIS — R22.41 SKIN LUMP OF LEG, RIGHT: ICD-10-CM

## 2022-03-30 LAB
ALBUMIN SERPL-MCNC: 4.2 G/DL (ref 3.5–5)
ALP SERPL-CCNC: 95 U/L (ref 45–120)
ALT SERPL W P-5'-P-CCNC: 15 U/L (ref 0–45)
ANION GAP SERPL CALCULATED.3IONS-SCNC: 12 MMOL/L (ref 5–18)
AST SERPL W P-5'-P-CCNC: 18 U/L (ref 0–40)
BILIRUB SERPL-MCNC: 0.7 MG/DL (ref 0–1)
BUN SERPL-MCNC: 11 MG/DL (ref 8–22)
CALCIUM SERPL-MCNC: 9.7 MG/DL (ref 8.5–10.5)
CHLORIDE BLD-SCNC: 106 MMOL/L (ref 98–107)
CO2 SERPL-SCNC: 24 MMOL/L (ref 22–31)
CREAT SERPL-MCNC: 0.8 MG/DL (ref 0.6–1.1)
ERYTHROCYTE [DISTWIDTH] IN BLOOD BY AUTOMATED COUNT: 12.5 % (ref 10–15)
ERYTHROCYTE [SEDIMENTATION RATE] IN BLOOD BY WESTERGREN METHOD: 36 MM/HR (ref 0–20)
GFR SERPL CREATININE-BSD FRML MDRD: >90 ML/MIN/1.73M2
GLUCOSE BLD-MCNC: 95 MG/DL (ref 70–125)
HCT VFR BLD AUTO: 42.4 % (ref 35–47)
HGB BLD-MCNC: 14.4 G/DL (ref 11.7–15.7)
MCH RBC QN AUTO: 30.1 PG (ref 26.5–33)
MCHC RBC AUTO-ENTMCNC: 34 G/DL (ref 31.5–36.5)
MCV RBC AUTO: 89 FL (ref 78–100)
PLATELET # BLD AUTO: 295 10E3/UL (ref 150–450)
POTASSIUM BLD-SCNC: 4.4 MMOL/L (ref 3.5–5)
PROT SERPL-MCNC: 7.8 G/DL (ref 6–8)
RBC # BLD AUTO: 4.78 10E6/UL (ref 3.8–5.2)
SODIUM SERPL-SCNC: 142 MMOL/L (ref 136–145)
TSH SERPL DL<=0.005 MIU/L-ACNC: 1.03 UIU/ML (ref 0.3–5)
WBC # BLD AUTO: 7.5 10E3/UL (ref 4–11)

## 2022-03-30 PROCEDURE — 84443 ASSAY THYROID STIM HORMONE: CPT | Performed by: FAMILY MEDICINE

## 2022-03-30 PROCEDURE — 80053 COMPREHEN METABOLIC PANEL: CPT | Performed by: FAMILY MEDICINE

## 2022-03-30 PROCEDURE — 36415 COLL VENOUS BLD VENIPUNCTURE: CPT | Performed by: FAMILY MEDICINE

## 2022-03-30 PROCEDURE — 86038 ANTINUCLEAR ANTIBODIES: CPT | Performed by: FAMILY MEDICINE

## 2022-03-30 PROCEDURE — 85027 COMPLETE CBC AUTOMATED: CPT | Performed by: FAMILY MEDICINE

## 2022-03-30 PROCEDURE — 99214 OFFICE O/P EST MOD 30 MIN: CPT | Performed by: FAMILY MEDICINE

## 2022-03-30 PROCEDURE — 85652 RBC SED RATE AUTOMATED: CPT | Performed by: FAMILY MEDICINE

## 2022-03-30 PROCEDURE — 86039 ANTINUCLEAR ANTIBODIES (ANA): CPT | Performed by: FAMILY MEDICINE

## 2022-03-30 NOTE — PROGRESS NOTES
Assessment & Plan     Skin rash  Exam findings were discussed  Consider heat rash versus, underlying metabolic process    Plan:  - TSH with free T4 reflex; Future  - Comprehensive metabolic panel (BMP + Alb, Alk Phos, ALT, AST, Total. Bili, TP); Future  - CBC with platelets; Future  - ESR: Erythrocyte sedimentation rate; Future  - Anti Nuclear Norma IgG by IFA with Reflex; Future    Result:   Positive BELINDA  Rheumatology referral     Skin lump of leg, right  - US Lower Extremity Non Vascular Right    Leg swelling  - US Lower Extremity Venous Duplex Bilateral  We will follow-up to the results of the study and manage accordingly.                            No follow-ups on file.    Isiah Anderson MD  [facility]    Rubio Law is a 36 year old female here for healthcare establishment, and presenting with having an nonpruritic erythematous rash on her chest for the past few months which seem aggravated with type of clothing or heat, but then over the past month has noted a few red patches extending up to her anterior neck without any itching or burning symptoms.    She also has a lump on the other side of her right thigh close to the knee present for a while without any associated pain or size enlargement.  She notes that ever since her last pregnancy a few years back, her calves have become bigger, and sometimes hurt.      History of Present Illness       Reason for visit:  Patches on my neck  Symptom onset:  More than a month  Symptom intensity:  Mild  Symptom progression:  Staying the same  Had these symptoms before:  No    She eats 0-1 servings of fruits and vegetables daily.She consumes 1 sweetened beverage(s) daily.She exercises with enough effort to increase her heart rate 9 or less minutes per day.  She exercises with enough effort to increase her heart rate 3 or less days per week.   She is taking medications regularly.      Review of Systems         Objective    /68    "Pulse 64   Temp 98.3  F (36.8  C)   Resp 18   Ht 1.778 m (5' 10\")   Wt 113.4 kg (250 lb)   SpO2 96%   BMI 35.87 kg/m    Body mass index is 35.87 kg/m .     Physical Exam   GENERAL: healthy, alert and no distress  Skin: Blanchable nonraised, erythematous rash of the anterior chest, patchy on the anterior neck  NECK: no adenopathy, no asymmetry, masses, or scars and mild anterior neck swelling   RESP: lungs clear to auscultation - no rales, rhonchi or wheezes  CV: regular rate and rhythm, normal S1 S2, no S3 or S4, no murmur, click or rub, no peripheral edema and peripheral pulses strong  ABDOMEN: soft, nontender, no hepatosplenomegaly, no masses and bowel sounds normal  MS: palpable lump on the lateral aspect of the distal right thigh, no other gross musculoskeletal defects noted, no significant edema                "

## 2022-04-01 LAB
ANA PAT SER IF-IMP: ABNORMAL
ANA SER QL IF: POSITIVE
ANA TITR SER IF: ABNORMAL {TITER}

## 2022-05-10 ENCOUNTER — OFFICE VISIT (OUTPATIENT)
Dept: RHEUMATOLOGY | Facility: CLINIC | Age: 37
End: 2022-05-10
Attending: FAMILY MEDICINE
Payer: COMMERCIAL

## 2022-05-10 VITALS
SYSTOLIC BLOOD PRESSURE: 130 MMHG | WEIGHT: 249 LBS | HEIGHT: 70 IN | DIASTOLIC BLOOD PRESSURE: 86 MMHG | BODY MASS INDEX: 35.65 KG/M2

## 2022-05-10 DIAGNOSIS — R76.8 POSITIVE ANA (ANTINUCLEAR ANTIBODY): Primary | ICD-10-CM

## 2022-05-10 DIAGNOSIS — R21 SKIN RASH: ICD-10-CM

## 2022-05-10 DIAGNOSIS — R22.1 NECK SWELLING: ICD-10-CM

## 2022-05-10 LAB
ALBUMIN UR-MCNC: NEGATIVE MG/DL
APPEARANCE UR: CLEAR
BACTERIA #/AREA URNS HPF: ABNORMAL /HPF
BILIRUB UR QL STRIP: NEGATIVE
COLOR UR AUTO: YELLOW
CRP SERPL-MCNC: <2.9 MG/L (ref 0–8)
ERYTHROCYTE [SEDIMENTATION RATE] IN BLOOD BY WESTERGREN METHOD: 28 MM/HR (ref 0–20)
GLUCOSE UR STRIP-MCNC: NEGATIVE MG/DL
HGB UR QL STRIP: ABNORMAL
KETONES UR STRIP-MCNC: NEGATIVE MG/DL
LEUKOCYTE ESTERASE UR QL STRIP: NEGATIVE
NITRATE UR QL: NEGATIVE
PH UR STRIP: 5.5 [PH] (ref 5–7)
RBC #/AREA URNS AUTO: ABNORMAL /HPF
SP GR UR STRIP: 1.02 (ref 1–1.03)
SQUAMOUS #/AREA URNS AUTO: ABNORMAL /LPF
UROBILINOGEN UR STRIP-ACNC: 0.2 E.U./DL
WBC #/AREA URNS AUTO: ABNORMAL /HPF

## 2022-05-10 PROCEDURE — 86803 HEPATITIS C AB TEST: CPT | Performed by: PHYSICIAN ASSISTANT

## 2022-05-10 PROCEDURE — 36415 COLL VENOUS BLD VENIPUNCTURE: CPT | Performed by: PHYSICIAN ASSISTANT

## 2022-05-10 PROCEDURE — 87389 HIV-1 AG W/HIV-1&-2 AB AG IA: CPT | Performed by: PHYSICIAN ASSISTANT

## 2022-05-10 PROCEDURE — 85652 RBC SED RATE AUTOMATED: CPT | Performed by: PHYSICIAN ASSISTANT

## 2022-05-10 PROCEDURE — 86140 C-REACTIVE PROTEIN: CPT | Performed by: PHYSICIAN ASSISTANT

## 2022-05-10 PROCEDURE — 86225 DNA ANTIBODY NATIVE: CPT | Performed by: PHYSICIAN ASSISTANT

## 2022-05-10 PROCEDURE — 81001 URINALYSIS AUTO W/SCOPE: CPT | Performed by: PHYSICIAN ASSISTANT

## 2022-05-10 PROCEDURE — 86160 COMPLEMENT ANTIGEN: CPT | Performed by: PHYSICIAN ASSISTANT

## 2022-05-10 PROCEDURE — 99204 OFFICE O/P NEW MOD 45 MIN: CPT | Performed by: PHYSICIAN ASSISTANT

## 2022-05-10 PROCEDURE — 86235 NUCLEAR ANTIGEN ANTIBODY: CPT | Performed by: PHYSICIAN ASSISTANT

## 2022-05-10 NOTE — PATIENT INSTRUCTIONS
After Visit Instructions:     Thank you for coming to [facility] for your care. It is my goal to partner with you to help you reach your optimal state of health.       Plan:     Schedule follow-up with Melissa Hameed PA-C as needed.    Imaging: xray of the neck  Labs: SAM panel, dsDNA, complement levels, urine, Hepatitis C, HIV, CRP and ESR  Other: referral to dermatology       Melissa Hameed PA-C  [facility]  [facility]    Contact information: [facility]  Clinic Number:  [phone]  Please call or send a Lizhi message with any questions about your care

## 2022-05-10 NOTE — PROGRESS NOTES
"Rheumatology Clinic Visit  [facility]  JOCY Sanchez     Ani Aquino MRN# [id number]   YOB: 1985 Age: 36 year old   Date of Visit: 05/10/2022  Primary care provider: Isiah Anderson          Assessment and Plan:     1.  Positive BELINDA  2.  Skin rash  3.  Neck swelling    Patient presents today for an evaluation of a rash on her neck.  She states that she noticed this as of January 2022 and feels that it has been worsening.  She does find that the rash gets \"irritated\" with heat and that it become slightly itchy.  No facial rashes.  Physical examination was remarkable for an erythematous rash with some small satellite lesions in the anterior aspect of her neck.  She also seems to have soft tissue swelling of the neck as well and reports trouble swallowing certain foods.  No synovitis, dactylitis, tenosynovitis or enthesitis.  Full range of motion of her joints.  Laboratory examination from 3/30/2022 was reviewed and showed a sed rate of 36 and an BELINDA positive with a titer of 1:160.  TSH was normal.    Reviewed with the patient that having a positive BELINDA is of unknown significance.  Given the symptoms of her worsening rash we will further investigate with a laboratory evaluation.  Would also like to get an x-ray of her neck to evaluate the soft tissues given the swelling and reports of trouble swallowing certain foods.  If the x-ray is negative she may need further work-up with her primary care provider for this.  I would also like her to see dermatology for further evaluation of this rash.    Plan:     1. Schedule follow-up with Melissa Hameed PA-C as needed.    2. Imaging: xray of the neck  3. Labs: SAM panel, dsDNA, complement levels, urine, Hepatitis C, HIV, CRP and ESR  4. Other: referral to dermatology     JOCY Sanchez  Rheumatology         History of Present Illness:   Ani Aquino presents for evaluation of skin rash and positive BELINDA.     She reports red patches on her " "neck. These are new as of January 2022. The rash occasionally itches. When she got in the car to come to this visit, the heat \"irritated\" the rash, causing it to be slightly itchy. She states that her legs feel tight and heavy at times, she has had this after having children. With her 1st pregnancy she had swelling in her legs. She feels that it has thinned out since then. The rash is just on the neck. She has not seen dermatology. No fascial rashes, but she does get some mild redness. No rashes after being in the sun. No mouth sores. No hair loss or balding spots. No seizures, blood clots or miscarriages. No dry eyes. She has some dry mouth, but has not noticed this as bothersome. She states that she has had issues with cavities in the past. No Raynauds. She occasionally gets sharp and tingling sensations in the tips of her fingers, R>L. She hurt her knees playing basketball and volleyball. She has pain and stiffness in her feet when she gets up. This lasts until she walks to her bathroom which is about 20ft. No joint swelling. She has noticed some shortness of breath with activity, but does not find this prohibitive. No coughing, chest pain or pleurisy. She does find certain foods harder to eat such as chewy candy (skittles) as they do not seem to go down as easily. No fevers. She reports getting sweaty at night but no real night sweats.     No known family history of rheumatoid arthritis or lupus. No personal or family history of psoriasis, ulcerative colitis or crohn's.          Review of Systems:     Constitutional: negative  Skin: As above  Eyes: negative  Ears/Nose/Throat: negative  Respiratory: No shortness of breath, dyspnea on exertion, cough, or hemoptysis  Cardiovascular: negative  Gastrointestinal: negative  Genitourinary: negative  Musculoskeletal: As above  Neurologic: negative  Psychiatric: negative  Hematologic/Lymphatic/Immunologic: negative  Endocrine: negative         Active Problem List: " "    Patient Active Problem List    Diagnosis Date Noted     SI (sacroiliac) joint dysfunction 2017     Priority: Medium     Hip pain, right 2017     Priority: Medium     17: PT referral placed, maternity belt prescription given       History of  2017     Priority: Medium     Hx of  in  due to fetal intolerance, HILLARY for op report sent  Desires TOLAC    17: EOB next visit- needs tolac consent   Need OP report  17: OP note reviewed.  Please review at HR rounds: uterine incision extended laterally on both sides, double layer closure.  OK for TOLAC?   17: Reviewed Op report with Dr. Boyle at HR Rounds. \"uterine incision extended laterally on both sides\" is a description of the routine way that hysterotomy is done.  Hx also reviewed. Okay for TOLAC.   17: TOLAC consent signed and scanned.        Irregular heart beat 2016     Priority: Medium     has had stress test, echo and holtor monitoring.                Past Medical History:     Past Medical History:   Diagnosis Date     Abnormal Pap smear 2009     Irregular heart beat 2016    has had stress test, echo and holtor monitoring.       Past Surgical History:   Procedure Laterality Date      SECTION      low transverse      SECTION              Social History:     Social History     Socioeconomic History     Marital status:      Spouse name: Moody     Number of children: 2     Years of education: Not on file     Highest education level: Not on file   Occupational History     Occupation: teacher     Employer: [employer]   Tobacco Use     Smoking status: Never Smoker     Smokeless tobacco: Never Used   Substance and Sexual Activity     Alcohol use: No     Alcohol/week: 0.0 standard drinks     Drug use: No     Sexual activity: Yes     Partners: Male     Birth control/protection: None   Other Topics Concern      Service No     Blood Transfusions No     Caffeine Concern Yes     " Comment: 1 soda     Occupational Exposure No     Hobby Hazards No     Sleep Concern No     Stress Concern No     Weight Concern No     Special Diet No     Back Care No     Exercise Yes     Comment: every day 30 mintues     Bike Helmet Yes     Seat Belt Yes     Self-Exams No   Social History Narrative    How much exercise per week? Walking 1-2 times weekly     How much calcium per day? 2 servings daily       How much caffeine per day? 1 can daily     How much vitamin D per day? No     Do you/your family wear seatbelts? Yes     Do you/your family use safety helmets? NA     Do you/your family use sunscreen? No     Do you/your family keep firearms in the home? No    Do you/your family have a smoke detector(s)? Yes        JENNIFER Willis CNM    5/23/19         Social Determinants of Health     Financial Resource Strain: Not on file   Food Insecurity: Not on file   Transportation Needs: Not on file   Physical Activity: Not on file   Stress: Not on file   Social Connections: Not on file   Intimate Partner Violence: Not on file   Housing Stability: Not on file          Family History:     Family History   Problem Relation Age of Onset     Diabetes Maternal Aunt      Diabetes Maternal Uncle      Coronary Artery Disease Paternal Grandmother      Other Cancer Paternal Grandmother      Coronary Artery Disease Paternal Grandfather      Hypertension Father      Breast Cancer Mother 38.00     Depression Maternal Grandfather      Other Cancer Maternal Grandfather      Asthma Maternal Grandfather      Osteoporosis Maternal Grandfather      Hyperlipidemia Father      Diabetes Maternal Grandmother      Heart Disease Paternal Grandmother      Hypertension Paternal Grandmother      Heart Disease Paternal Grandfather      Hypertension Paternal Grandfather      Cerebrovascular Disease No family hx of      Clotting Disorder No family hx of             Allergies:   No Known Allergies         Medications:     Current  Outpatient Medications   Medication Sig Dispense Refill     DEBLITANE 0.35 MG per tablet TAKE ONE TABLET BY MOUTH EVERY DAY (Patient not taking: Reported on 5/23/2019) 84 tablet 3     Docosahexaenoic Acid (PRENATAL DHA PO) Take 1 tablet by mouth daily (Patient not taking: Reported on 8/7/2021)       ibuprofen (ADVIL/MOTRIN) 400 MG tablet Take 1-2 tablets (400-800 mg) by mouth every 6 hours as needed for other (cramping) (Patient not taking: Reported on 9/6/2017) 120 tablet 0     Misc. Devices (BREAST PUMP) MISC 1 each daily as needed (Patient not taking: Reported on 8/7/2021) 1 each 0     norethindrone (MICRONOR) 0.35 MG per tablet Take 1 tablet (0.35 mg) by mouth daily (Patient not taking: Reported on 9/6/2017) 84 tablet 1            Physical Exam:   not currently breastfeeding.  Wt Readings from Last 6 Encounters:   03/30/22 113.4 kg (250 lb)   05/23/19 110.2 kg (243 lb)   07/10/18 100.1 kg (220 lb 11.2 oz)   10/17/17 102.4 kg (225 lb 11.2 oz)   09/06/17 102.6 kg (226 lb 4.8 oz)   08/22/17 114.3 kg (252 lb)     Constitutional: well-developed, appearing stated age; cooperative  Eyes: nl EOM, PERRLA, vision, conjunctiva, sclera  ENT: nl external ears, nose, hearing, lips, teeth, gums, throat. No mucositis.   No mucous membrane lesions, normal saliva pool  Neck: no mass or thyroid enlargement.  She does have what appears to be soft tissue swelling.  Not necessarily associated with the rash.  Resp: lungs clear to auscultation, nl to palpation  CV: RRR, no murmurs, rubs or gallops, no edema  Lymph: no cervical, supraclavicular or epitrochlear nodes  MS: The TMJ, neck, shoulder, elbow, wrist, MCP/PIP/DIP, spine, hip, knee, ankle, and foot MTP/IP joints were examined and found normal. No active synovitis or altered joint anatomy. Full joint ROM. No dactylitis,  tenosynovitis, enthesopathy.   Skin: no nail pitting, alopecia.  She has an erythematous raised rash on the anterior aspect of her neck with some satellite  lesions towards the right lateral aspect.  No ulcerations or open sores.  Neuro: nl cranial nerves.   Psych: nl judgement, orientation, memory, affect.           Data:   Imaging:  N/A    Laboratory:  3/30/2022  Creatinine 0.8, GFR greater than 80  ALT 15, AST 18  TSH 1.03  CBC within normal parameters  Sed rate 36  BELINDA positive with a speckled pattern and a titer of 1:160

## 2022-05-11 LAB
C3 SERPL-MCNC: 138 MG/DL (ref 81–157)
C4 SERPL-MCNC: 30 MG/DL (ref 13–39)
DSDNA AB SER-ACNC: 0.8 IU/ML
ENA SM IGG SER IA-ACNC: <1.6 U/ML
ENA SM IGG SER IA-ACNC: NEGATIVE
ENA SS-A AB SER IA-ACNC: <0.5 U/ML
ENA SS-A AB SER IA-ACNC: NEGATIVE
ENA SS-B IGG SER IA-ACNC: <0.6 U/ML
ENA SS-B IGG SER IA-ACNC: NEGATIVE
HCV AB SERPL QL IA: NONREACTIVE
HIV 1+2 AB+HIV1 P24 AG SERPL QL IA: NONREACTIVE
U1 SNRNP IGG SER IA-ACNC: 1.4 U/ML
U1 SNRNP IGG SER IA-ACNC: NEGATIVE

## 2022-05-16 ENCOUNTER — HEALTH MAINTENANCE LETTER (OUTPATIENT)
Age: 37
End: 2022-05-16

## 2022-09-11 ENCOUNTER — HEALTH MAINTENANCE LETTER (OUTPATIENT)
Age: 37
End: 2022-09-11

## 2022-12-18 ENCOUNTER — NURSE TRIAGE (OUTPATIENT)
Dept: NURSING | Facility: CLINIC | Age: 37
End: 2022-12-18

## 2022-12-18 NOTE — TELEPHONE ENCOUNTER
loosr stools for 11 days, 1-2 x/day, pure liquid, no vomiting, no abdominal pain, able to drink and eat, no other symptoms    Nurse Triage SBAR    Is this a 2nd Level Triage? NO    Situation: Patient calling with questions about Diarrhea.      Consent: not needed    Background: Pt has had diarrhea for past 11 days    Assessment:  1-2 x/day, pure liquid, no vomiting, mild  abdominal pain intermittantly, able to drink and eat, no other symptoms    Protocol Recommended Disposition:   See PCP Within 24 Hours    Recommendation: Advised patient to Go to urgent care . Reviewed concerning symptoms and when to call back.     Does the patient meet one of the following criteria for ADS visit consideration? No     Pt states understanding of need to see provider in next 24 hours, will most likely go to Urgent Care tomorrow morning    Leelee Amor RN [facility] 12/18/2022 4:45 PM                                Reason for Disposition    Abdominal pain  (Exception: Pain clears with each passage of diarrhea stool)    Additional Information    Negative: Shock suspected (e.g., cold/pale/clammy skin, too weak to stand, low BP, rapid pulse)    Negative: Difficult to awaken or acting confused (e.g., disoriented, slurred speech)    Negative: Sounds like a life-threatening emergency to the triager    Negative: Vomiting also present and worse than the diarrhea    Negative: [1] Blood in stool AND [2] without diarrhea    Negative: Diarrhea in a cancer patient who is currently (or recently) receiving chemotherapy or radiation therapy, or cancer patient who has metastatic or end-stage cancer and is receiving palliative care    Negative: [1] SEVERE abdominal pain (e.g., excruciating) AND [2] present > 1 hour    Negative: [1] SEVERE abdominal pain AND [2] age > 60 years    Negative: [1] Blood in the stool AND [2] moderate or large amount of blood    Negative: Black or tarry bowel movements (Exception: chronic-unchanged black-grey  bowel movements AND is taking iron pills or Pepto-bismol)    Negative: [1] Drinking very little AND [2] dehydration suspected (e.g., no urine > 12 hours, very dry mouth, very lightheaded)    Negative: Patient sounds very sick or weak to the triager    Negative: [1] SEVERE diarrhea (e.g., 7 or more times / day more than normal) AND [2] age > 60 years    Negative: [1] Constant abdominal pain AND [2] present > 2 hours    Negative: [1] Fever > 103 F (39.4 C) AND [2] not able to get the fever down using Fever Care Advice    Negative: [1] SEVERE diarrhea (e.g., 7 or more times / day more than normal) AND [2] present > 24 hours (1 day)    Negative: [1] MODERATE diarrhea (e.g., 4-6 times / day more than normal) AND [2] present > 48 hours (2 days)    Negative: [1] MODERATE diarrhea (e.g., 4-6 times / day more than normal) AND [2] age > 70 years    Negative: Fever > 101 F (38.3 C)    Negative: Fever present > 3 days (72 hours)    Protocols used: DIARRHEA-A-AH

## 2022-12-19 ENCOUNTER — OFFICE VISIT (OUTPATIENT)
Dept: FAMILY MEDICINE | Facility: CLINIC | Age: 37
End: 2022-12-19
Payer: COMMERCIAL

## 2022-12-19 VITALS
HEART RATE: 83 BPM | BODY MASS INDEX: 35.97 KG/M2 | OXYGEN SATURATION: 98 % | WEIGHT: 250.7 LBS | TEMPERATURE: 97.7 F | SYSTOLIC BLOOD PRESSURE: 129 MMHG | DIASTOLIC BLOOD PRESSURE: 82 MMHG

## 2022-12-19 DIAGNOSIS — R10.32 ABDOMINAL PAIN, LEFT LOWER QUADRANT: Primary | ICD-10-CM

## 2022-12-19 DIAGNOSIS — R19.7 DIARRHEA, UNSPECIFIED TYPE: ICD-10-CM

## 2022-12-19 LAB
ALBUMIN UR-MCNC: NEGATIVE MG/DL
APPEARANCE UR: CLEAR
BACTERIA #/AREA URNS HPF: ABNORMAL /HPF
BASOPHILS # BLD AUTO: 0 10E3/UL (ref 0–0.2)
BASOPHILS NFR BLD AUTO: 0 %
BILIRUB UR QL STRIP: NEGATIVE
COLOR UR AUTO: YELLOW
EOSINOPHIL # BLD AUTO: 0.1 10E3/UL (ref 0–0.7)
EOSINOPHIL NFR BLD AUTO: 1 %
ERYTHROCYTE [DISTWIDTH] IN BLOOD BY AUTOMATED COUNT: 12.1 % (ref 10–15)
GLUCOSE UR STRIP-MCNC: NEGATIVE MG/DL
HCG UR QL: NEGATIVE
HCT VFR BLD AUTO: 42.2 % (ref 35–47)
HGB BLD-MCNC: 14.2 G/DL (ref 11.7–15.7)
HGB UR QL STRIP: ABNORMAL
IMM GRANULOCYTES # BLD: 0 10E3/UL
IMM GRANULOCYTES NFR BLD: 0 %
KETONES UR STRIP-MCNC: NEGATIVE MG/DL
LEUKOCYTE ESTERASE UR QL STRIP: NEGATIVE
LYMPHOCYTES # BLD AUTO: 4.2 10E3/UL (ref 0.8–5.3)
LYMPHOCYTES NFR BLD AUTO: 46 %
MCH RBC QN AUTO: 29.6 PG (ref 26.5–33)
MCHC RBC AUTO-ENTMCNC: 33.6 G/DL (ref 31.5–36.5)
MCV RBC AUTO: 88 FL (ref 78–100)
MONOCYTES # BLD AUTO: 0.6 10E3/UL (ref 0–1.3)
MONOCYTES NFR BLD AUTO: 7 %
NEUTROPHILS # BLD AUTO: 4.1 10E3/UL (ref 1.6–8.3)
NEUTROPHILS NFR BLD AUTO: 46 %
NITRATE UR QL: NEGATIVE
PH UR STRIP: 6 [PH] (ref 5–8)
PLATELET # BLD AUTO: 287 10E3/UL (ref 150–450)
RBC # BLD AUTO: 4.79 10E6/UL (ref 3.8–5.2)
RBC #/AREA URNS AUTO: ABNORMAL /HPF
SP GR UR STRIP: 1.01 (ref 1–1.03)
SQUAMOUS #/AREA URNS AUTO: ABNORMAL /LPF
UROBILINOGEN UR STRIP-ACNC: 0.2 E.U./DL
WBC # BLD AUTO: 9.1 10E3/UL (ref 4–11)
WBC #/AREA URNS AUTO: ABNORMAL /HPF

## 2022-12-19 PROCEDURE — 36415 COLL VENOUS BLD VENIPUNCTURE: CPT | Performed by: PHYSICIAN ASSISTANT

## 2022-12-19 PROCEDURE — 85025 COMPLETE CBC W/AUTO DIFF WBC: CPT | Performed by: PHYSICIAN ASSISTANT

## 2022-12-19 PROCEDURE — 81001 URINALYSIS AUTO W/SCOPE: CPT | Performed by: PHYSICIAN ASSISTANT

## 2022-12-19 PROCEDURE — 81025 URINE PREGNANCY TEST: CPT | Performed by: PHYSICIAN ASSISTANT

## 2022-12-19 PROCEDURE — 99213 OFFICE O/P EST LOW 20 MIN: CPT | Performed by: PHYSICIAN ASSISTANT

## 2022-12-19 NOTE — PROGRESS NOTES
Assessment & Plan     Abdominal pain, left lower quadrant  Lab work reassuring, advised patient to avoid meats and dairy for about a week and follow up with primary if ssx persist. Given red flags of fever, chills, and blood in the stool and she will follow up sooner if they occur. Patient understands and is amenable to plan.    - CBC with platelets and differential; Future  - UA macro with reflex to Microscopic and Culture - Clinc Collect  - HCG qualitative urine; Future  - CBC with platelets and differential  - HCG qualitative urine  - Urine Microscopic    Diarrhea, unspecified type  As above      Meghan Thomson PA-C  [facility]    Rubio Law is a 37 year old presenting for the following health issues:  Diarrhea (Ongoing x11 days. /)  She states she has had diarrhea - 1-2 times a day, low abdominal ache/urge and she has a loose stool. She is not having formed stool in between recently. There was one day last week that seemed to be improving initially, but then loose stools came back. Patient can't think of any triggers. She states she was bloated/constipated before this started, so she took a stool softener, stools returned to normal for 4 days and then the diarrhea started. She experienced nausea initially, but that is improved. She took a pregnancy test last week that was negative. LMP about 3 weeks ago. She is not on birth control, she is sexually active. She has noticed a slight increased urge in urination.     HPI       Review of Systems   Constitutional, HEENT, cardiovascular, pulmonary, gi and gu systems are negative, except as otherwise noted.      Objective    /82 (BP Location: Right arm, Patient Position: Sitting, Cuff Size: Adult Large)   Pulse 83   Temp 97.7  F (36.5  C) (Tympanic)   Wt 113.7 kg (250 lb 11.2 oz)   SpO2 98%   BMI 35.97 kg/m    Body mass index is 35.97 kg/m .  Physical Exam   GENERAL: healthy, alert and no distress  RESP: lungs clear  to auscultation - no rales, rhonchi or wheezes  CV: regular rate and rhythm, normal S1 S2, no S3 or S4, no murmur, click or rub, no peripheral edema and peripheral pulses strong  ABDOMEN: tenderness RLQ and LLQ, no organomegaly or masses, bowel sounds normal and no palpable or pulsatile masses  MS: no gross musculoskeletal defects noted, no edema    Results for orders placed or performed in visit on 12/19/22 (from the past 24 hour(s))   CBC with platelets and differential    Narrative    The following orders were created for panel order CBC with platelets and differential.  Procedure                               Abnormality         Status                     ---------                               -----------         ------                     CBC with platelets and d...[[id number]]                      Final result                 Please view results for these tests on the individual orders.   CBC with platelets and differential   Result Value Ref Range    WBC Count 9.1 4.0 - 11.0 10e3/uL    RBC Count 4.79 3.80 - 5.20 10e6/uL    Hemoglobin 14.2 11.7 - 15.7 g/dL    Hematocrit 42.2 35.0 - 47.0 %    MCV 88 78 - 100 fL    MCH 29.6 26.5 - 33.0 pg    MCHC 33.6 31.5 - 36.5 g/dL    RDW 12.1 10.0 - 15.0 %    Platelet Count 287 150 - 450 10e3/uL    % Neutrophils 46 %    % Lymphocytes 46 %    % Monocytes 7 %    % Eosinophils 1 %    % Basophils 0 %    % Immature Granulocytes 0 %    Absolute Neutrophils 4.1 1.6 - 8.3 10e3/uL    Absolute Lymphocytes 4.2 0.8 - 5.3 10e3/uL    Absolute Monocytes 0.6 0.0 - 1.3 10e3/uL    Absolute Eosinophils 0.1 0.0 - 0.7 10e3/uL    Absolute Basophils 0.0 0.0 - 0.2 10e3/uL    Absolute Immature Granulocytes 0.0 <=0.4 10e3/uL   UA macro with reflex to Microscopic and Culture - Clinc Collect    Specimen: Urine, Midstream   Result Value Ref Range    Color Urine Yellow Colorless, Straw, Light Yellow, Yellow    Appearance Urine Clear Clear    Glucose Urine Negative Negative mg/dL    Bilirubin Urine  Negative Negative    Ketones Urine Negative Negative mg/dL    Specific Gravity Urine 1.010 1.005 - 1.030    Blood Urine Small (A) Negative    pH Urine 6.0 5.0 - 8.0    Protein Albumin Urine Negative Negative mg/dL    Urobilinogen Urine 0.2 0.2, 1.0 E.U./dL    Nitrite Urine Negative Negative    Leukocyte Esterase Urine Negative Negative   HCG qualitative urine   Result Value Ref Range    hCG Urine Qualitative Negative Negative   Urine Microscopic   Result Value Ref Range    Bacteria Urine Few (A) None Seen /HPF    RBC Urine 0-2 0-2 /HPF /HPF    WBC Urine None Seen 0-5 /HPF /HPF    Squamous Epithelials Urine Moderate (A) None Seen /LPF    Narrative    Urine Culture not indicated

## 2023-01-31 ENCOUNTER — OFFICE VISIT (OUTPATIENT)
Dept: DERMATOLOGY | Facility: CLINIC | Age: 38
End: 2023-01-31
Payer: COMMERCIAL

## 2023-01-31 DIAGNOSIS — R21 SKIN RASH: ICD-10-CM

## 2023-01-31 DIAGNOSIS — L30.9 DERMATITIS: Primary | ICD-10-CM

## 2023-01-31 PROCEDURE — 88305 TISSUE EXAM BY PATHOLOGIST: CPT | Performed by: DERMATOLOGY

## 2023-01-31 PROCEDURE — 88341 IMHCHEM/IMCYTCHM EA ADD ANTB: CPT | Performed by: DERMATOLOGY

## 2023-01-31 PROCEDURE — 11102 TANGNTL BX SKIN SINGLE LES: CPT | Performed by: DERMATOLOGY

## 2023-01-31 PROCEDURE — 99243 OFF/OP CNSLTJ NEW/EST LOW 30: CPT | Mod: 25 | Performed by: DERMATOLOGY

## 2023-01-31 PROCEDURE — 88312 SPECIAL STAINS GROUP 1: CPT | Performed by: DERMATOLOGY

## 2023-01-31 PROCEDURE — 88342 IMHCHEM/IMCYTCHM 1ST ANTB: CPT | Performed by: DERMATOLOGY

## 2023-01-31 NOTE — PATIENT INSTRUCTIONS
Two zyrtec at bedtime    Two claritin in the morning          Wound Care Instructions     FOR SUPERFICIAL WOUNDS     [facility] [phone]    [facility] [phone]                       AFTER 24 HOURS YOU SHOULD REMOVE THE BANDAGE AND BEGIN DAILY DRESSING CHANGES AS FOLLOWS:     1) Remove Dressing.     2) Clean and dry the area with tap water using a Q-tip or sterile gauze pad.     3) Apply Vaseline, Aquaphor, Polysporin ointment or Bacitracin ointment over entire wound.  Do NOT use Neosporin ointment.     4) Cover the wound with a band-aid, or a sterile non-stick gauze pad and micropore paper tape      REPEAT THESE INSTRUCTIONS AT LEAST ONCE A DAY UNTIL THE WOUND HAS COMPLETELY HEALED.    It is an old wives tale that a wound heals better when it is exposed to air and allowed to dry out. The wound will heal faster with a better cosmetic result if it is kept moist with ointment and covered with a bandage.    **Do not let the wound dry out.**      Supplies Needed:      *Cotton tipped applicators (Q-tips)    *Polysporin Ointment or Bacitracin Ointment (NOT NEOSPORIN)    *Band-aids or non-stick gauze pads and micropore paper tape.      PATIENT INFORMATION:    During the healing process you will notice a number of changes. All wounds develop a small halo of redness surrounding the wound.  This means healing is occurring. Severe itching with extensive redness usually indicates sensitivity to the ointment or bandage tape used to dress the wound.  You should call our office if this develops.      Swelling  and/or discoloration around your surgical site is common, particularly when performed around the eye.    All wounds normally drain.  The larger the wound the more drainage there will be.  After 7-10 days, you will notice the wound beginning to shrink and new skin will begin to grow.  The wound is healed when you can see skin has formed over the entire area.  A healed wound has a healthy,  shiny look to the surface and is red to dark pink in color to normalize.  Wounds may take approximately 4-6 weeks to heal.  Larger wounds may take 6-8 weeks.  After the wound is healed you may discontinue dressing changes.    You may experience a sensation of tightness as your wound heals. This is normal and will gradually subside.    Your healed wound may be sensitive to temperature changes. This sensitivity improves with time, but if you re having a lot of discomfort, try to avoid temperature extremes.    Patients frequently experience itching after their wound appears to have healed because of the continue healing under the skin.  Plain Vaseline will help relieve the itching.        POSSIBLE COMPLICATIONS    BLEEDING:    Leave the bandage in place.  Use tightly rolled up gauze or a cloth to apply direct pressure over the bandage for 30  minutes.  Reapply pressure for an additional 30 minutes if necessary  Use additional gauze and tape to maintain pressure once the bleeding has stopped.

## 2023-01-31 NOTE — PROGRESS NOTES
Ani Aquino , a 37 year old year old female patient, I was asked to see by HUGO Hameed , for rash on chest.  Patient states this has been present for months.  Patient reports the following symptoms:  Red.   .  Patient reports the following previous treatments none.  Patient reports the following modifying factors none.  Associated symptoms: none.  POsitive BELINDA but systemic workup negative for lupus.  .  Patient has no other skin complaints today.  Remainder of the HPI, Meds, PMH, Allergies, FH, and SH was reviewed in chart.      Past Medical History:   Diagnosis Date     Abnormal Pap smear      Irregular heart beat 2016    has had stress test, echo and holtor monitoring.         Past Surgical History:   Procedure Laterality Date      SECTION      low transverse      SECTION          Family History   Problem Relation Age of Onset     Diabetes Maternal Aunt      Diabetes Maternal Uncle      Coronary Artery Disease Paternal Grandmother      Other Cancer Paternal Grandmother      Coronary Artery Disease Paternal Grandfather      Hypertension Father      Breast Cancer Mother 38.00     Depression Maternal Grandfather      Other Cancer Maternal Grandfather      Asthma Maternal Grandfather      Osteoporosis Maternal Grandfather      Hyperlipidemia Father      Diabetes Maternal Grandmother      Heart Disease Paternal Grandmother      Hypertension Paternal Grandmother      Heart Disease Paternal Grandfather      Hypertension Paternal Grandfather      Cerebrovascular Disease No family hx of      Clotting Disorder No family hx of        Social History     Socioeconomic History     Marital status:      Spouse name: Moody     Number of children: 2     Years of education: Not on file     Highest education level: Not on file   Occupational History     Occupation: teacher     Employer: [employer]   Tobacco Use     Smoking status: Never     Smokeless tobacco: Never   Substance and Sexual Activity     Alcohol  use: No     Alcohol/week: 0.0 standard drinks     Drug use: No     Sexual activity: Yes     Partners: Male     Birth control/protection: None   Other Topics Concern      Service No     Blood Transfusions No     Caffeine Concern Yes     Comment: 1 soda     Occupational Exposure No     Hobby Hazards No     Sleep Concern No     Stress Concern No     Weight Concern No     Special Diet No     Back Care No     Exercise Yes     Comment: every day 30 mintues     Bike Helmet Yes     Seat Belt Yes     Self-Exams No   Social History Narrative    How much exercise per week? Walking 1-2 times weekly     How much calcium per day? 2 servings daily       How much caffeine per day? 1 can daily     How much vitamin D per day? No     Do you/your family wear seatbelts? Yes     Do you/your family use safety helmets? NA     Do you/your family use sunscreen? No     Do you/your family keep firearms in the home? No    Do you/your family have a smoke detector(s)? Yes        JENNIFER Willis CNM    5/23/19         Social Determinants of Health     Financial Resource Strain: Not on file   Food Insecurity: Not on file   Transportation Needs: Not on file   Physical Activity: Not on file   Stress: Not on file   Social Connections: Not on file   Intimate Partner Violence: Not on file   Housing Stability: Not on file       Outpatient Encounter Medications as of 1/31/2023   Medication Sig Dispense Refill     DEBLITANE 0.35 MG per tablet TAKE ONE TABLET BY MOUTH EVERY DAY (Patient not taking: Reported on 5/23/2019) 84 tablet 3     Docosahexaenoic Acid (PRENATAL DHA PO) Take 1 tablet by mouth daily (Patient not taking: Reported on 8/7/2021)       ibuprofen (ADVIL/MOTRIN) 400 MG tablet Take 1-2 tablets (400-800 mg) by mouth every 6 hours as needed for other (cramping) (Patient not taking: Reported on 9/6/2017) 120 tablet 0     Misc. Devices (BREAST PUMP) MISC 1 each daily as needed (Patient not taking: Reported on 8/7/2021) 1  each 0     norethindrone (MICRONOR) 0.35 MG per tablet Take 1 tablet (0.35 mg) by mouth daily (Patient not taking: Reported on 9/6/2017) 84 tablet 1     No facility-administered encounter medications on file as of 1/31/2023.             Review Of Systems  Skin: As above  Eyes: negative  Ears/Nose/Throat: negative  Respiratory: No shortness of breath, dyspnea on exertion, cough, or hemoptysis  Cardiovascular: negative  Gastrointestinal: negative  Genitourinary: negative  Musculoskeletal: negative  Neurologic: negative  Psychiatric: negative  Hematologic/Lymphatic/Immunologic: negative  Endocrine: negative      O:   NAD, WDWN, Alert & Oriented, Mood & Affect wnl, Vitals stable   Here today alone   General appearance seema ii   Vitals stable   Alert, oriented and in no acute distress   dermatographism on trunk   L neck and chest red plaque      Eyes: Conjunctivae/lids:Normal     ENT: Lips, buccal mucosa, tongue: normal    MSK:Normal    Cardiovascular: peripheral edema none    Pulm: Breathing Normal    Neuro/Psych: Orientation:Normal; Mood/Affect:Normal      A/P:  1. Urticaria v Tumid lupus  L chest   TANGENTIAL BIOPSY SENT OUT:  After consent, anesthesia with LEC and prep, tangential excision performed and specimen sent out for permanent section histology.  No complications and routine wound care. Patient told to call our office in 1-2 weeks for result.       claritin two in am  Two zyrtec bedtime  It was a pleasure speaking to Ani Aquino today.  Previous clinic  notes and pertinent laboratory tests were reviewed prior to Ani Aquino's visit.  Return to clinic 2 weeks virtual

## 2023-01-31 NOTE — LETTER
2023         RE: Ani Aquino  [address] [facility] [address] [facility] [address]        Dear Colleague,    Thank you for referring your patient, Ani Aquino, to the [facility]. Please see a copy of my visit note below.    Ani Aquino , a 37 year old year old female patient, I was asked to see by HUGO Hameed , for rash on chest.  Patient states this has been present for months.  Patient reports the following symptoms:  Red.   .  Patient reports the following previous treatments none.  Patient reports the following modifying factors none.  Associated symptoms: none.  POsitive BELINDA but systemic workup negative for lupus.  .  Patient has no other skin complaints today.  Remainder of the HPI, Meds, PMH, Allergies, FH, and SH was reviewed in chart.      Past Medical History:   Diagnosis Date     Abnormal Pap smear      Irregular heart beat 2016    has had stress test, echo and holtor monitoring.         Past Surgical History:   Procedure Laterality Date      SECTION      low transverse      SECTION          Family History   Problem Relation Age of Onset     Diabetes Maternal Aunt      Diabetes Maternal Uncle      Coronary Artery Disease Paternal Grandmother      Other Cancer Paternal Grandmother      Coronary Artery Disease Paternal Grandfather      Hypertension Father      Breast Cancer Mother 38.00     Depression Maternal Grandfather      Other Cancer Maternal Grandfather      Asthma Maternal Grandfather      Osteoporosis Maternal Grandfather      Hyperlipidemia Father      Diabetes Maternal Grandmother      Heart Disease Paternal Grandmother      Hypertension Paternal Grandmother      Heart Disease Paternal Grandfather      Hypertension Paternal Grandfather      Cerebrovascular Disease No family hx of      Clotting Disorder No family hx of        Social History     Socioeconomic History     Marital status:      Spouse name: Moody     Number of children: 2     Years of  education: Not on file     Highest education level: Not on file   Occupational History     Occupation: teacher     Employer: [employer]   Tobacco Use     Smoking status: Never     Smokeless tobacco: Never   Substance and Sexual Activity     Alcohol use: No     Alcohol/week: 0.0 standard drinks     Drug use: No     Sexual activity: Yes     Partners: Male     Birth control/protection: None   Other Topics Concern      Service No     Blood Transfusions No     Caffeine Concern Yes     Comment: 1 soda     Occupational Exposure No     Hobby Hazards No     Sleep Concern No     Stress Concern No     Weight Concern No     Special Diet No     Back Care No     Exercise Yes     Comment: every day 30 mintues     Bike Helmet Yes     Seat Belt Yes     Self-Exams No   Social History Narrative    How much exercise per week? Walking 1-2 times weekly     How much calcium per day? 2 servings daily       How much caffeine per day? 1 can daily     How much vitamin D per day? No     Do you/your family wear seatbelts? Yes     Do you/your family use safety helmets? NA     Do you/your family use sunscreen? No     Do you/your family keep firearms in the home? No    Do you/your family have a smoke detector(s)? Yes        JENNIFER Willis CNM    5/23/19         Social Determinants of Health     Financial Resource Strain: Not on file   Food Insecurity: Not on file   Transportation Needs: Not on file   Physical Activity: Not on file   Stress: Not on file   Social Connections: Not on file   Intimate Partner Violence: Not on file   Housing Stability: Not on file       Outpatient Encounter Medications as of 1/31/2023   Medication Sig Dispense Refill     DEBLITANE 0.35 MG per tablet TAKE ONE TABLET BY MOUTH EVERY DAY (Patient not taking: Reported on 5/23/2019) 84 tablet 3     Docosahexaenoic Acid (PRENATAL DHA PO) Take 1 tablet by mouth daily (Patient not taking: Reported on 8/7/2021)       ibuprofen (ADVIL/MOTRIN) 400 MG tablet Take  1-2 tablets (400-800 mg) by mouth every 6 hours as needed for other (cramping) (Patient not taking: Reported on 9/6/2017) 120 tablet 0     Misc. Devices (BREAST PUMP) MISC 1 each daily as needed (Patient not taking: Reported on 8/7/2021) 1 each 0     norethindrone (MICRONOR) 0.35 MG per tablet Take 1 tablet (0.35 mg) by mouth daily (Patient not taking: Reported on 9/6/2017) 84 tablet 1     No facility-administered encounter medications on file as of 1/31/2023.             Review Of Systems  Skin: As above  Eyes: negative  Ears/Nose/Throat: negative  Respiratory: No shortness of breath, dyspnea on exertion, cough, or hemoptysis  Cardiovascular: negative  Gastrointestinal: negative  Genitourinary: negative  Musculoskeletal: negative  Neurologic: negative  Psychiatric: negative  Hematologic/Lymphatic/Immunologic: negative  Endocrine: negative      O:   NAD, WDWN, Alert & Oriented, Mood & Affect wnl, Vitals stable   Here today alone   General appearance seema ii   Vitals stable   Alert, oriented and in no acute distress   dermatographism on trunk   L neck and chest red plaque      Eyes: Conjunctivae/lids:Normal     ENT: Lips, buccal mucosa, tongue: normal    MSK:Normal    Cardiovascular: peripheral edema none    Pulm: Breathing Normal    Neuro/Psych: Orientation:Normal; Mood/Affect:Normal      A/P:  1. Urticaria v Tumid lupus  L chest   TANGENTIAL BIOPSY SENT OUT:  After consent, anesthesia with LEC and prep, tangential excision performed and specimen sent out for permanent section histology.  No complications and routine wound care. Patient told to call our office in 1-2 weeks for result.       claritin two in am  Two zyrtec bedtime  It was a pleasure speaking to Ani Aquino today.  Previous clinic  notes and pertinent laboratory tests were reviewed prior to Ani Aquino's visit.  Return to clinic 2 weeks virtual         Again, thank you for allowing me to participate in the care of your patient.         Sincerely,        Serafin Jaramillo MD

## 2023-02-03 LAB
PATH REPORT.COMMENTS IMP SPEC: NORMAL
PATH REPORT.FINAL DX SPEC: NORMAL
PATH REPORT.GROSS SPEC: NORMAL
PATH REPORT.MICROSCOPIC SPEC OTHER STN: NORMAL
PATH REPORT.RELEVANT HX SPEC: NORMAL

## 2023-02-14 ENCOUNTER — VIRTUAL VISIT (OUTPATIENT)
Dept: DERMATOLOGY | Facility: CLINIC | Age: 38
End: 2023-02-14
Payer: COMMERCIAL

## 2023-02-14 DIAGNOSIS — L50.9 URTICARIA: Primary | ICD-10-CM

## 2023-02-14 PROCEDURE — 99212 OFFICE O/P EST SF 10 MIN: CPT | Mod: 95 | Performed by: DERMATOLOGY

## 2023-02-14 NOTE — PROGRESS NOTES
"    Ani Aquino is a 37 year old female who is being evaluated via a phone  visit.      The patient has been notified of following:     \"This phone  visit will be conducted via a call between you and your physician/provider. We have found that certain health care needs can be provided without the need for an in-person physical exam.  This service lets us provide the care you need with a video conversation.  If a prescription is necessary we can send it directly to your pharmacy.  If lab work is needed we can place an order for that and you can then stop by our lab to have the test done at a later time.    Phone visits are billed at different rates depending on your insurance coverage.  Please reach out to your insurance provider with any questions.    If during the course of the call the physician/provider feels a phone visit is not appropriate, you will not be charged for this service.\"    Patient has given verbal consent for phone visit? Yes    How would you like to obtain your AVS? Leonid    Ani Aquino is a 37 year old year old female patient here today for f/u rash.  Bx showed not much maybe urticaria.  Blood work autoimmune disease negative.  Using zyrtec, frequency has decreased.  Patient has no other skin complaints today.  Remainder of the HPI, Meds, PMH, Allergies, FH, and SH was reviewed in chart.      Past Medical History:   Diagnosis Date     Abnormal Pap smear      Irregular heart beat 2016    has had stress test, echo and holtor monitoring.         Past Surgical History:   Procedure Laterality Date      SECTION      low transverse      SECTION          Family History   Problem Relation Age of Onset     Diabetes Maternal Aunt      Diabetes Maternal Uncle      Coronary Artery Disease Paternal Grandmother      Other Cancer Paternal Grandmother      Coronary Artery Disease Paternal Grandfather      Hypertension Father      Breast Cancer Mother 38.00     Depression Maternal " Grandfather      Other Cancer Maternal Grandfather      Asthma Maternal Grandfather      Osteoporosis Maternal Grandfather      Hyperlipidemia Father      Diabetes Maternal Grandmother      Heart Disease Paternal Grandmother      Hypertension Paternal Grandmother      Heart Disease Paternal Grandfather      Hypertension Paternal Grandfather      Cerebrovascular Disease No family hx of      Clotting Disorder No family hx of        Social History     Socioeconomic History     Marital status:      Spouse name: Moody     Number of children: 2     Years of education: Not on file     Highest education level: Not on file   Occupational History     Occupation: teacher     Employer: [employer]   Tobacco Use     Smoking status: Never     Smokeless tobacco: Never   Substance and Sexual Activity     Alcohol use: No     Alcohol/week: 0.0 standard drinks     Drug use: No     Sexual activity: Yes     Partners: Male     Birth control/protection: None   Other Topics Concern      Service No     Blood Transfusions No     Caffeine Concern Yes     Comment: 1 soda     Occupational Exposure No     Hobby Hazards No     Sleep Concern No     Stress Concern No     Weight Concern No     Special Diet No     Back Care No     Exercise Yes     Comment: every day 30 mintues     Bike Helmet Yes     Seat Belt Yes     Self-Exams No   Social History Narrative    How much exercise per week? Walking 1-2 times weekly     How much calcium per day? 2 servings daily       How much caffeine per day? 1 can daily     How much vitamin D per day? No     Do you/your family wear seatbelts? Yes     Do you/your family use safety helmets? NA     Do you/your family use sunscreen? No     Do you/your family keep firearms in the home? No    Do you/your family have a smoke detector(s)? Yes        JENNIFER Willis CNM    5/23/19         Social Determinants of Health     Financial Resource Strain: Not on file   Food Insecurity: Not on file    Transportation Needs: Not on file   Physical Activity: Not on file   Stress: Not on file   Social Connections: Not on file   Intimate Partner Violence: Not on file   Housing Stability: Not on file       Outpatient Encounter Medications as of 2/14/2023   Medication Sig Dispense Refill     DEBLITANE 0.35 MG per tablet TAKE ONE TABLET BY MOUTH EVERY DAY (Patient not taking: Reported on 5/23/2019) 84 tablet 3     Docosahexaenoic Acid (PRENATAL DHA PO) Take 1 tablet by mouth daily (Patient not taking: Reported on 8/7/2021)       ibuprofen (ADVIL/MOTRIN) 400 MG tablet Take 1-2 tablets (400-800 mg) by mouth every 6 hours as needed for other (cramping) (Patient not taking: Reported on 9/6/2017) 120 tablet 0     Misc. Devices (BREAST PUMP) MISC 1 each daily as needed (Patient not taking: Reported on 8/7/2021) 1 each 0     norethindrone (MICRONOR) 0.35 MG per tablet Take 1 tablet (0.35 mg) by mouth daily (Patient not taking: Reported on 9/6/2017) 84 tablet 1     No facility-administered encounter medications on file as of 2/14/2023.             Review Of Systems  Skin: As above  Eyes: negative  Ears/Nose/Throat: negative  Respiratory: No shortness of breath, dyspnea on exertion, cough, or hemoptysis  Cardiovascular: negative  Gastrointestinal: negative  Genitourinary: negative  Musculoskeletal: negative  Neurologic: negative  Psychiatric: negative  Hematologic/Lymphatic/Immunologic: negative  Endocrine: negative      O:   Alert & Orientedx3, Mood & Affect wnl,    General appearance normal   Alert, oriented and in no acute distress     Photos:clear chest with healing bx site       Pulm: Breathing Normal, talking in normal sentences, no shortness of breath during conversation    Neuro/Psych: Orientation:Alert and Orientedx3 ; Mood/Affect:normal ; no anxiety or depression     A/P:  1.Favor urticaria  Pathophysiology discussed with pateint   Cont zyrtec  Return to clinic prn   It was a pleasure speaking to Ani Aquino  today.  Previous clinic  notes and pertinent laboratory tests were reviewed prior to Ani Aquino's visit.  Photos good    Teledermatology information:  - Location of patient: home  - Location of teledermatologist: [facility]    - Reason teledermatology is appropriate: of National Emergency Regarding Coronavirus disease (COVID 19) Outbreak  - The patient's condition can safely be assessed using telemedicine: yes  - Method of transmission: store and forward teledermatology  - In-person dermatology visit recommendation: no  - Service start time:1015am/pm  - Service end time:1033am/pm  - Date of report: 02/14/23

## 2023-02-14 NOTE — LETTER
"    2023         RE: Ani Aquino  [address] [address]        Dear Colleague,    Thank you for referring your patient, Ani Aquino, to the [facility]. Please see a copy of my visit note below.        Ani Aquino is a 37 year old female who is being evaluated via a phone  visit.      The patient has been notified of following:     \"This phone  visit will be conducted via a call between you and your physician/provider. We have found that certain health care needs can be provided without the need for an in-person physical exam.  This service lets us provide the care you need with a video conversation.  If a prescription is necessary we can send it directly to your pharmacy.  If lab work is needed we can place an order for that and you can then stop by our lab to have the test done at a later time.    Phone visits are billed at different rates depending on your insurance coverage.  Please reach out to your insurance provider with any questions.    If during the course of the call the physician/provider feels a phone visit is not appropriate, you will not be charged for this service.\"    Patient has given verbal consent for phone visit? Yes    How would you like to obtain your AVS? MyChart    Ani Aquino is a 37 year old year old female patient here today for f/u rash.  Bx showed not much maybe urticaria.  Blood work autoimmune disease negative.  Using zyrtec, frequency has decreased.  Patient has no other skin complaints today.  Remainder of the HPI, Meds, PMH, Allergies, FH, and SH was reviewed in chart.      Past Medical History:   Diagnosis Date     Abnormal Pap smear      Irregular heart beat 2016    has had stress test, echo and holtor monitoring.         Past Surgical History:   Procedure Laterality Date      SECTION      low transverse      SECTION          Family History   Problem Relation Age of Onset     Diabetes Maternal Aunt      Diabetes " Maternal Uncle      Coronary Artery Disease Paternal Grandmother      Other Cancer Paternal Grandmother      Coronary Artery Disease Paternal Grandfather      Hypertension Father      Breast Cancer Mother 38.00     Depression Maternal Grandfather      Other Cancer Maternal Grandfather      Asthma Maternal Grandfather      Osteoporosis Maternal Grandfather      Hyperlipidemia Father      Diabetes Maternal Grandmother      Heart Disease Paternal Grandmother      Hypertension Paternal Grandmother      Heart Disease Paternal Grandfather      Hypertension Paternal Grandfather      Cerebrovascular Disease No family hx of      Clotting Disorder No family hx of        Social History     Socioeconomic History     Marital status:      Spouse name: Moody     Number of children: 2     Years of education: Not on file     Highest education level: Not on file   Occupational History     Occupation: teacher     Employer: [email or web address]   Tobacco Use     Smoking status: Never     Smokeless tobacco: Never   Substance and Sexual Activity     Alcohol use: No     Alcohol/week: 0.0 standard drinks     Drug use: No     Sexual activity: Yes     Partners: Male     Birth control/protection: None   Other Topics Concern      Service No     Blood Transfusions No     Caffeine Concern Yes     Comment: 1 soda     Occupational Exposure No     Hobby Hazards No     Sleep Concern No     Stress Concern No     Weight Concern No     Special Diet No     Back Care No     Exercise Yes     Comment: every day 30 mintues     Bike Helmet Yes     Seat Belt Yes     Self-Exams No   Social History Narrative    How much exercise per week? Walking 1-2 times weekly     How much calcium per day? 2 servings daily       How much caffeine per day? 1 can daily     How much vitamin D per day? No     Do you/your family wear seatbelts? Yes     Do you/your family use safety helmets? NA     Do you/your family use sunscreen? No     Do you/your family keep firearms in the  home? No    Do you/your family have a smoke detector(s)? Yes        Zeenat Ray JENNIFER Tineo CNM    5/23/19         Social Determinants of Health     Financial Resource Strain: Not on file   Food Insecurity: Not on file   Transportation Needs: Not on file   Physical Activity: Not on file   Stress: Not on file   Social Connections: Not on file   Intimate Partner Violence: Not on file   Housing Stability: Not on file       Outpatient Encounter Medications as of 2/14/2023   Medication Sig Dispense Refill     DEBLITANE 0.35 MG per tablet TAKE ONE TABLET BY MOUTH EVERY DAY (Patient not taking: Reported on 5/23/2019) 84 tablet 3     Docosahexaenoic Acid (PRENATAL DHA PO) Take 1 tablet by mouth daily (Patient not taking: Reported on 8/7/2021)       ibuprofen (ADVIL/MOTRIN) 400 MG tablet Take 1-2 tablets (400-800 mg) by mouth every 6 hours as needed for other (cramping) (Patient not taking: Reported on 9/6/2017) 120 tablet 0     Misc. Devices (BREAST PUMP) MISC 1 each daily as needed (Patient not taking: Reported on 8/7/2021) 1 each 0     norethindrone (MICRONOR) 0.35 MG per tablet Take 1 tablet (0.35 mg) by mouth daily (Patient not taking: Reported on 9/6/2017) 84 tablet 1     No facility-administered encounter medications on file as of 2/14/2023.             Review Of Systems  Skin: As above  Eyes: negative  Ears/Nose/Throat: negative  Respiratory: No shortness of breath, dyspnea on exertion, cough, or hemoptysis  Cardiovascular: negative  Gastrointestinal: negative  Genitourinary: negative  Musculoskeletal: negative  Neurologic: negative  Psychiatric: negative  Hematologic/Lymphatic/Immunologic: negative  Endocrine: negative      O:   Alert & Orientedx3, Mood & Affect wnl,    General appearance normal   Alert, oriented and in no acute distress     Photos:clear chest with healing bx site       Pulm: Breathing Normal, talking in normal sentences, no shortness of breath during conversation    Neuro/Psych:  Orientation:Alert and Orientedx3 ; Mood/Affect:normal ; no anxiety or depression     A/P:  1.Favor urticaria  Pathophysiology discussed with pateint   Cont zyrtec  Return to clinic prn   It was a pleasure speaking to Ani Aquino today.  Previous clinic  notes and pertinent laboratory tests were reviewed prior to Ani Aquino's visit.  Photos good    Teledermatology information:  - Location of patient: home  - Location of teledermatologist: [facility] [place]    - Reason teledermatology is appropriate: of National Emergency Regarding Coronavirus disease (COVID 19) Outbreak  - The patient's condition can safely be assessed using telemedicine: yes  - Method of transmission: store and forward teledermatology  - In-person dermatology visit recommendation: no  - Service start time:1015am/pm  - Service end time:1033am/pm  - Date of report: 02/14/23        Again, thank you for allowing me to participate in the care of your patient.        Sincerely,        Serafin Jaramillo MD

## 2023-06-03 ENCOUNTER — HEALTH MAINTENANCE LETTER (OUTPATIENT)
Age: 38
End: 2023-06-03

## 2023-07-17 ENCOUNTER — OFFICE VISIT (OUTPATIENT)
Dept: FAMILY MEDICINE | Facility: CLINIC | Age: 38
End: 2023-07-17
Payer: COMMERCIAL

## 2023-07-17 VITALS
OXYGEN SATURATION: 99 % | WEIGHT: 254.2 LBS | RESPIRATION RATE: 16 BRPM | BODY MASS INDEX: 36.47 KG/M2 | HEART RATE: 102 BPM | SYSTOLIC BLOOD PRESSURE: 130 MMHG | TEMPERATURE: 98.6 F | DIASTOLIC BLOOD PRESSURE: 84 MMHG

## 2023-07-17 DIAGNOSIS — J02.0 STREPTOCOCCAL PHARYNGITIS: Primary | ICD-10-CM

## 2023-07-17 DIAGNOSIS — R07.0 THROAT PAIN: ICD-10-CM

## 2023-07-17 DIAGNOSIS — R68.83 CHILLS: ICD-10-CM

## 2023-07-17 LAB
DEPRECATED S PYO AG THROAT QL EIA: POSITIVE
SARS-COV-2 RNA RESP QL NAA+PROBE: NEGATIVE

## 2023-07-17 PROCEDURE — 87880 STREP A ASSAY W/OPTIC: CPT | Performed by: NURSE PRACTITIONER

## 2023-07-17 PROCEDURE — 87635 SARS-COV-2 COVID-19 AMP PRB: CPT | Performed by: NURSE PRACTITIONER

## 2023-07-17 PROCEDURE — 99213 OFFICE O/P EST LOW 20 MIN: CPT | Performed by: NURSE PRACTITIONER

## 2023-07-17 RX ORDER — AMOXICILLIN 500 MG/1
1000 CAPSULE ORAL DAILY
Qty: 20 CAPSULE | Refills: 0 | Status: SHIPPED | OUTPATIENT
Start: 2023-07-17 | End: 2023-07-27

## 2023-07-17 ASSESSMENT — ENCOUNTER SYMPTOMS: FEVER: 0

## 2023-07-17 NOTE — LETTER
July 17, 2023      Ani Aquino  [address] [address]        To Whom It May Concern:    Ani Aquino  was seen on 7/17.  Please excuse her  until 7/19 due to illness - strep throat.        Sincerely,        Serene Watts, CNP

## 2023-07-17 NOTE — PROGRESS NOTES
Assessment & Plan     Throat pain    - Streptococcus A Rapid Screen w/Reflex to PCR - Clinic Collect    Chills    - Symptomatic COVID-19 Virus (Coronavirus) by PCR Nose    Streptococcal pharyngitis    - amoxicillin (AMOXIL) 500 MG capsule  Dispense: 20 capsule; Refill: 0     Very painful, brightly erythematous throat today.  Patient worse with preschoolers.    Strep is positive today.      No in-person work/school for at least 24 hours following start of treatment or until fever less than 100.4.        Push fluids.  Ibuprofen or Tylenol for pain as directed on package as needed for pain or fever.        Return to clinic if not feeling much better in 2 or 3 days or new symptoms develop.                No follow-ups on file.    Serene Watts [facility]     Ani is a 37 year old female who presents to clinic today for the following health issues:  Chief Complaint   Patient presents with     Throat Pain     X yesterday night. Chills. Pain when swallow.     Ear Problem     Lt ear ache x yesterday.     HPI    Throat pain starting yesterday night rated 10 out of 10 with chills.  No cough or congestion.  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    Able to swallow.        Review of Systems   Constitutional: Negative for fever.           Objective    /84   Pulse 102   Temp 98.6  F (37  C) (Oral)   Resp 16   Wt 115.3 kg (254 lb 3.2 oz)   SpO2 99%   BMI 36.47 kg/m    Physical Exam  Constitutional:       Appearance: Normal appearance.   HENT:      Mouth/Throat:      Mouth: Mucous membranes are moist.      Pharynx: Posterior oropharyngeal erythema (Bright pink erythema) present. No oropharyngeal exudate.      Tonsils: No tonsillar exudate or tonsillar abscesses. 2+ on the right. 2+ on the left.   Neurological:      Mental Status: She is alert.            Results for orders placed or performed in visit on 07/17/23 (from the past 24 hour(s))   Streptococcus A Rapid Screen  w/Reflex to PCR - Clinic Collect    Specimen: Throat; Swab   Result Value Ref Range    Group A Strep antigen Positive (A) Negative

## 2023-08-06 ENCOUNTER — OFFICE VISIT (OUTPATIENT)
Dept: FAMILY MEDICINE | Facility: CLINIC | Age: 38
End: 2023-08-06
Payer: COMMERCIAL

## 2023-08-06 VITALS
BODY MASS INDEX: 35.87 KG/M2 | WEIGHT: 250 LBS | SYSTOLIC BLOOD PRESSURE: 126 MMHG | HEART RATE: 80 BPM | OXYGEN SATURATION: 97 % | RESPIRATION RATE: 16 BRPM | DIASTOLIC BLOOD PRESSURE: 84 MMHG | TEMPERATURE: 98.1 F

## 2023-08-06 DIAGNOSIS — R35.0 URINARY FREQUENCY: ICD-10-CM

## 2023-08-06 DIAGNOSIS — N30.00 ACUTE CYSTITIS WITHOUT HEMATURIA: Primary | ICD-10-CM

## 2023-08-06 LAB
ALBUMIN UR-MCNC: NEGATIVE MG/DL
AMORPH CRY #/AREA URNS HPF: ABNORMAL /HPF
APPEARANCE UR: ABNORMAL
BACTERIA #/AREA URNS HPF: ABNORMAL /HPF
BILIRUB UR QL STRIP: NEGATIVE
COLOR UR AUTO: YELLOW
GLUCOSE UR STRIP-MCNC: NEGATIVE MG/DL
HGB UR QL STRIP: ABNORMAL
KETONES UR STRIP-MCNC: NEGATIVE MG/DL
LEUKOCYTE ESTERASE UR QL STRIP: ABNORMAL
NITRATE UR QL: NEGATIVE
PH UR STRIP: 7.5 [PH] (ref 5–8)
RBC #/AREA URNS AUTO: ABNORMAL /HPF
SP GR UR STRIP: 1.02 (ref 1–1.03)
SQUAMOUS #/AREA URNS AUTO: ABNORMAL /LPF
UROBILINOGEN UR STRIP-ACNC: 1 E.U./DL
WBC #/AREA URNS AUTO: ABNORMAL /HPF
WBC CLUMPS #/AREA URNS HPF: PRESENT /HPF

## 2023-08-06 PROCEDURE — 87086 URINE CULTURE/COLONY COUNT: CPT | Performed by: PHYSICIAN ASSISTANT

## 2023-08-06 PROCEDURE — 81001 URINALYSIS AUTO W/SCOPE: CPT | Performed by: PHYSICIAN ASSISTANT

## 2023-08-06 PROCEDURE — 87186 SC STD MICRODIL/AGAR DIL: CPT | Performed by: PHYSICIAN ASSISTANT

## 2023-08-06 PROCEDURE — 99213 OFFICE O/P EST LOW 20 MIN: CPT | Performed by: PHYSICIAN ASSISTANT

## 2023-08-06 RX ORDER — NITROFURANTOIN 25; 75 MG/1; MG/1
100 CAPSULE ORAL 2 TIMES DAILY
Qty: 10 CAPSULE | Refills: 0 | Status: SHIPPED | OUTPATIENT
Start: 2023-08-06 | End: 2023-08-11

## 2023-08-06 ASSESSMENT — ENCOUNTER SYMPTOMS
CHILLS: 0
ABDOMINAL PAIN: 1
FLANK PAIN: 0
FEVER: 0
VOMITING: 0
HEMATURIA: 0
FREQUENCY: 1
DYSURIA: 0
NAUSEA: 0

## 2023-08-06 NOTE — PROGRESS NOTES
Patient presents with:  Urinary Problem: Urinary Frequency and Pressure          ICD-10-CM    1. Acute cystitis without hematuria  N30.00 nitroFURantoin macrocrystal-monohydrate (MACROBID) 100 MG capsule      2. Urinary frequency  R35.0 UA Macroscopic with reflex to Microscopic and Culture - Clinic Collect     Urine Microscopic Exam     Urine Culture          Patient Instructions   1. Increase fluid intake  2. Complete antibiotic regimen as prescribed. You will be notified if the treatment plan needs to be changed based on the urine culture results.   3. Follow if you have a fever of 100.4 F (38 C) or higher, no improvement after three days of treatment, trouble urinating because of pain,new or increased discharge from the vagina, rash or joint pain, Increased back or abdominal pain.      HPI:  Ani Aquino is a 37 year old female who presents today complaining of urinary frequency and bladder pressure x 1 day. Patient has never had a UTI before. Patient denies any chance of pregnancy and is not breast feeding. She denies any concern for STD.     History obtained from the patient.    Problem List:  2017: Leakage of amniotic fluid  2017: , delivered  2017: Decreased fetal movement  2017:  labor  2017: Large for dates affecting management of mother, third   trimester, not applicable or unspecified fetus  2017: SI (sacroiliac) joint dysfunction  2017: Hip pain, right  2017: Rubella non-immune status, antepartum  2017: Vitamin D deficiency  2017: Supervision of high risk [facility] CN care  2017: History of   2016: Irregular heart beat      Past Medical History:   Diagnosis Date    Abnormal Pap smear 2009    Irregular heart beat 2016    has had stress test, echo and holtor monitoring.         Social History     Tobacco Use    Smoking status: Never    Smokeless tobacco: Never   Substance Use Topics    Alcohol use: No     Alcohol/week: 0.0 standard drinks of alcohol        Review of Systems   Constitutional:  Negative for chills and fever.   Gastrointestinal:  Positive for abdominal pain (low crampy, pressure sensation). Negative for nausea and vomiting.   Genitourinary:  Positive for frequency. Negative for dysuria, flank pain, hematuria, vaginal discharge and vaginal pain.       Vitals:    08/06/23 0916   BP: 126/84   Pulse: 80   Resp: 16   Temp: 98.1  F (36.7  C)   TempSrc: Oral   SpO2: 97%   Weight: 113.4 kg (250 lb)       Physical Exam  Vitals and nursing note reviewed.   Constitutional:       General: She is not in acute distress.     Appearance: She is not ill-appearing.   HENT:      Head: Normocephalic and atraumatic.      Right Ear: External ear normal.      Left Ear: External ear normal.   Eyes:      Conjunctiva/sclera: Conjunctivae normal.   Neurological:      Mental Status: She is alert.   Psychiatric:         Mood and Affect: Mood normal.         Behavior: Behavior normal.         Thought Content: Thought content normal.         Judgment: Judgment normal.         Results:  Results for orders placed or performed in visit on 08/06/23   UA Macroscopic with reflex to Microscopic and Culture - Clinic Collect     Status: Abnormal    Specimen: Urine, Clean Catch   Result Value Ref Range    Color Urine Yellow Colorless, Straw, Light Yellow, Yellow    Appearance Urine Slightly Cloudy (A) Clear    Glucose Urine Negative Negative mg/dL    Bilirubin Urine Negative Negative    Ketones Urine Negative Negative mg/dL    Specific Gravity Urine 1.020 1.005 - 1.030    Blood Urine Moderate (A) Negative    pH Urine 7.5 5.0 - 8.0    Protein Albumin Urine Negative Negative mg/dL    Urobilinogen Urine 1.0 0.2, 1.0 E.U./dL    Nitrite Urine Negative Negative    Leukocyte Esterase Urine Moderate (A) Negative   Urine Microscopic Exam     Status: Abnormal   Result Value Ref Range    Bacteria Urine Few (A) None Seen /HPF    RBC Urine 10-25 (A) 0-2 /HPF /HPF    WBC Urine 25-50 (A) 0-5 /HPF /HPF     Squamous Epithelials Urine Few (A) None Seen /LPF    WBC Clumps Urine Present (A) None Seen /HPF    Amorphous Crystals Urine Moderate (A) None Seen /HPF         At the end of the encounter, I discussed results, diagnosis, medications. Discussed red flags for immediate return to clinic/ER, as well as indications for follow up if no improvement. Patient understood and agreed to plan. Patient was stable for discharge.

## 2023-08-07 LAB — BACTERIA UR CULT: ABNORMAL

## 2023-09-05 ENCOUNTER — TELEPHONE (OUTPATIENT)
Dept: FAMILY MEDICINE | Facility: CLINIC | Age: 38
End: 2023-09-05

## 2023-09-05 NOTE — TELEPHONE ENCOUNTER
Patient Returning Call    Reason for call:  Calling back - might be about insurance but unsure so needing to clarify    Information relayed to patient:  n/a    Patient has additional questions:  No      Could we send this information to you in [employer] or would you prefer to receive a phone call?:   Patient would prefer a phone call   Okay to leave a detailed message?: Yes at Cell number on file:    Telephone Information:   Mobile [phone]

## 2023-09-05 NOTE — TELEPHONE ENCOUNTER
Called and spoke to mom. Updated all the information through epic.     TJ Dennis, [facility] September 5, 2023 5:06 PM

## 2023-12-24 ENCOUNTER — OFFICE VISIT (OUTPATIENT)
Dept: FAMILY MEDICINE | Facility: CLINIC | Age: 38
End: 2023-12-24
Payer: COMMERCIAL

## 2023-12-24 VITALS
WEIGHT: 252.1 LBS | DIASTOLIC BLOOD PRESSURE: 83 MMHG | HEART RATE: 70 BPM | RESPIRATION RATE: 16 BRPM | SYSTOLIC BLOOD PRESSURE: 123 MMHG | BODY MASS INDEX: 36.17 KG/M2 | OXYGEN SATURATION: 97 % | TEMPERATURE: 98.3 F

## 2023-12-24 DIAGNOSIS — H10.023 PINK EYE DISEASE OF BOTH EYES: Primary | ICD-10-CM

## 2023-12-24 PROCEDURE — 99213 OFFICE O/P EST LOW 20 MIN: CPT | Performed by: PHYSICIAN ASSISTANT

## 2023-12-24 RX ORDER — OFLOXACIN 3 MG/ML
1-2 SOLUTION/ DROPS OPHTHALMIC
Qty: 5 ML | Refills: 0 | Status: SHIPPED | OUTPATIENT
Start: 2023-12-24 | End: 2023-12-31

## 2023-12-24 NOTE — PROGRESS NOTES
Chief Complaint   Patient presents with    Red Eye Both Eyes     X3d, crusting today       ASSESSMENT/PLAN:  Ani was seen today for red eye both eyes.    Diagnoses and all orders for this visit:    Pink eye disease of both eyes  -     ofloxacin (OCUFLOX) 0.3 % ophthalmic solution; Place 1-2 drops into both eyes every 2 hours (while awake) for 7 days    Difficult to say if is viral versus bacterial.  Will start on drops above or could watch and wait for the next few days and start them if symptoms do not resolve.    Negrito Alvarado PA-C      SUBJECTIVE:  Ani is a 38 year old female who presents to urgent care with 2 to 3 days of eye redness, irritation, feels like there is a film over it and then blinks and there is a discharge and waking up with her eyes crusted shut.  Does wear contacts.  Has had some mild nasal congestion.    ROS: Pertinent ROS neg other than the symptoms noted above in the HPI.     OBJECTIVE:  /83   Pulse 70   Temp 98.3  F (36.8  C) (Oral)   Resp 16   Wt 114.4 kg (252 lb 1.6 oz)   SpO2 97%   BMI 36.17 kg/m     GENERAL: healthy, alert and no distress  EYES: PERRLA, no photophobia, no foreign body on eyelid eversion, minimal conjunctival erythema, dried discharge over the course of eyes  HENT: No significant oropharynx erythema, nose and mouth without ulcers or lesions    DIAGNOSTICS    No results found for any visits on 23.     Current Outpatient Medications   Medication    DEBLITANE 0.35 MG per tablet    Docosahexaenoic Acid (PRENATAL DHA PO)    ibuprofen (ADVIL/MOTRIN) 400 MG tablet    Misc. Devices (BREAST PUMP) MISC    norethindrone (MICRONOR) 0.35 MG per tablet     No current facility-administered medications for this visit.      Patient Active Problem List   Diagnosis    History of     Irregular heart beat    Hip pain, right    SI (sacroiliac) joint dysfunction      Past Medical History:   Diagnosis Date    Abnormal Pap smear     Irregular heart beat  2016    has had stress test, echo and holtor monitoring.       Past Surgical History:   Procedure Laterality Date     SECTION      low transverse     SECTION       Family History   Problem Relation Age of Onset    Diabetes Maternal Aunt     Diabetes Maternal Uncle     Coronary Artery Disease Paternal Grandmother     Other Cancer Paternal Grandmother     Coronary Artery Disease Paternal Grandfather     Hypertension Father     Breast Cancer Mother 38.00    Depression Maternal Grandfather     Other Cancer Maternal Grandfather     Asthma Maternal Grandfather     Osteoporosis Maternal Grandfather     Hyperlipidemia Father     Diabetes Maternal Grandmother     Heart Disease Paternal Grandmother     Hypertension Paternal Grandmother     Heart Disease Paternal Grandfather     Hypertension Paternal Grandfather     Cerebrovascular Disease No family hx of     Clotting Disorder No family hx of      Social History     Tobacco Use    Smoking status: Never    Smokeless tobacco: Never   Substance Use Topics    Alcohol use: No     Alcohol/week: 0.0 standard drinks of alcohol              The plan of care was discussed with the patient. They understand and agree with the course of treatment prescribed. A printed summary was given including instructions and medications.  The use of Dragon/Wasabi 3D dictation services may have been used to construct the content in this note; any grammatical or spelling errors are non-intentional. Please contact the author of this note directly if you are in need of any clarification.

## 2024-01-18 ENCOUNTER — OFFICE VISIT (OUTPATIENT)
Dept: FAMILY MEDICINE | Facility: CLINIC | Age: 39
End: 2024-01-18
Payer: COMMERCIAL

## 2024-01-18 VITALS
TEMPERATURE: 98.1 F | DIASTOLIC BLOOD PRESSURE: 72 MMHG | SYSTOLIC BLOOD PRESSURE: 110 MMHG | OXYGEN SATURATION: 99 % | WEIGHT: 254.5 LBS | BODY MASS INDEX: 36.43 KG/M2 | HEIGHT: 70 IN | HEART RATE: 92 BPM | RESPIRATION RATE: 20 BRPM

## 2024-01-18 DIAGNOSIS — M25.569 PAIN OF PATELLOFEMORAL JOINT, UNSPECIFIED LATERALITY: ICD-10-CM

## 2024-01-18 DIAGNOSIS — N64.4 BREAST PAIN, RIGHT: Primary | ICD-10-CM

## 2024-01-18 PROCEDURE — 99214 OFFICE O/P EST MOD 30 MIN: CPT | Performed by: FAMILY MEDICINE

## 2024-01-18 NOTE — PROGRESS NOTES
"  Assessment & Plan     (N64.4) Breast pain, right  (primary encounter diagnosis)  Comment:   Plan: MA Diagnostic Digital Right        We will follow-up to the results of the study and manage accordingly.      (A20.396) Pain of patellofemoral joint, unspecified laterality  Comment: Exam findings discussed  Plan: Physical Therapy Referral                    BMI  Estimated body mass index is 36.52 kg/m  as calculated from the following:    Height as of this encounter: 1.778 m (5' 10\").    Weight as of this encounter: 115.4 kg (254 lb 8 oz).           Subjective   Ani is a 38 year old, presenting with ongoing bilateral knee pain for the past couple years with progressive worsening for the past month, aggravated by movements, sitting to standing with knee flexion.       Pending the breast, more towards the nipple area for the past 2 to 3 weeks with sensation of fullness.  There is no overlying skin changes, palpable lumps or nipple discharge .   There is a family history (mother ) with the breast cancer diagnosed at age 40          1/18/2024    11:13 AM   Additional Questions   Roomed by Swati BLAS     History of Present Illness       Reason for visit:  Pain in right breast  ( main reason)  and knee pain  Symptom onset:  1-2 weeks ago  Symptoms include:  Sharp pain,  burning feeling in right nipple area  Symptom intensity:  Severe  Symptom progression:  Worsening  Had these symptoms before:  No  What makes it worse:  No  What makes it better:  No    She eats 0-1 servings of fruits and vegetables daily.She consumes 1 sweetened beverage(s) daily.She exercises with enough effort to increase her heart rate 9 or less minutes per day.  She exercises with enough effort to increase her heart rate 3 or less days per week.   She is taking medications regularly.         Objective    /72   Pulse 92   Temp 98.1  F (36.7  C) (Oral)   Resp 20   Ht 1.778 m (5' 10\")   Wt 115.4 kg (254 lb 8 oz)   LMP 12/16/2023 (Approximate)  "  SpO2 99%   BMI 36.52 kg/m    Body mass index is 36.52 kg/m .  Physical Exam   Knee exam: Normal to inspection, no swelling, + apprehension test            Signed Electronically by: Isiah Anderson MD

## 2024-01-25 ENCOUNTER — ANCILLARY PROCEDURE (OUTPATIENT)
Dept: MAMMOGRAPHY | Facility: CLINIC | Age: 39
End: 2024-01-25
Attending: FAMILY MEDICINE
Payer: COMMERCIAL

## 2024-01-25 DIAGNOSIS — N64.4 BREAST PAIN, RIGHT: ICD-10-CM

## 2024-01-25 PROCEDURE — 76642 ULTRASOUND BREAST LIMITED: CPT | Mod: RT

## 2024-01-25 PROCEDURE — 77062 BREAST TOMOSYNTHESIS BI: CPT

## 2024-03-06 ENCOUNTER — OFFICE VISIT (OUTPATIENT)
Dept: FAMILY MEDICINE | Facility: CLINIC | Age: 39
End: 2024-03-06
Payer: COMMERCIAL

## 2024-03-06 ENCOUNTER — APPOINTMENT (OUTPATIENT)
Dept: RADIOLOGY | Facility: HOSPITAL | Age: 39
End: 2024-03-06
Attending: FAMILY MEDICINE
Payer: COMMERCIAL

## 2024-03-06 ENCOUNTER — NURSE TRIAGE (OUTPATIENT)
Dept: NURSING | Facility: CLINIC | Age: 39
End: 2024-03-06

## 2024-03-06 ENCOUNTER — HOSPITAL ENCOUNTER (EMERGENCY)
Facility: HOSPITAL | Age: 39
Discharge: HOME | End: 2024-03-06
Attending: FAMILY MEDICINE | Admitting: FAMILY MEDICINE
Payer: COMMERCIAL

## 2024-03-06 VITALS
OXYGEN SATURATION: 98 % | HEART RATE: 92 BPM | TEMPERATURE: 97.5 F | BODY MASS INDEX: 35 KG/M2 | WEIGHT: 250 LBS | RESPIRATION RATE: 15 BRPM | SYSTOLIC BLOOD PRESSURE: 140 MMHG | DIASTOLIC BLOOD PRESSURE: 66 MMHG | HEIGHT: 71 IN

## 2024-03-06 VITALS
RESPIRATION RATE: 14 BRPM | SYSTOLIC BLOOD PRESSURE: 123 MMHG | HEART RATE: 80 BPM | TEMPERATURE: 98.6 F | WEIGHT: 254 LBS | BODY MASS INDEX: 36.45 KG/M2 | DIASTOLIC BLOOD PRESSURE: 86 MMHG | OXYGEN SATURATION: 100 %

## 2024-03-06 DIAGNOSIS — R42 LIGHTHEADEDNESS: ICD-10-CM

## 2024-03-06 DIAGNOSIS — R53.83 OTHER FATIGUE: ICD-10-CM

## 2024-03-06 DIAGNOSIS — M79.622 PAIN OF LEFT UPPER ARM: ICD-10-CM

## 2024-03-06 DIAGNOSIS — D72.829 LEUKOCYTOSIS, UNSPECIFIED TYPE: ICD-10-CM

## 2024-03-06 DIAGNOSIS — D72.820 LYMPHOCYTOSIS: ICD-10-CM

## 2024-03-06 DIAGNOSIS — R07.9 ACUTE CHEST PAIN: Primary | ICD-10-CM

## 2024-03-06 PROBLEM — M25.551 HIP PAIN, RIGHT: Status: ACTIVE | Noted: 2017-06-05

## 2024-03-06 LAB
ALBUMIN SERPL BCG-MCNC: 4.6 G/DL (ref 3.5–5.2)
ALBUMIN UR-MCNC: NEGATIVE MG/DL
ALP SERPL-CCNC: 107 U/L (ref 40–150)
ALT SERPL W P-5'-P-CCNC: 21 U/L (ref 0–50)
ANION GAP SERPL CALCULATED.3IONS-SCNC: 11 MMOL/L (ref 7–15)
APPEARANCE UR: CLEAR
AST SERPL W P-5'-P-CCNC: 23 U/L (ref 0–45)
BASOPHILS # BLD AUTO: ABNORMAL 10*3/UL
BASOPHILS NFR BLD AUTO: ABNORMAL %
BILIRUB DIRECT SERPL-MCNC: <0.2 MG/DL (ref 0–0.3)
BILIRUB SERPL-MCNC: 0.5 MG/DL
BILIRUB UR QL STRIP: NEGATIVE
BUN SERPL-MCNC: 9.5 MG/DL (ref 6–20)
CALCIUM SERPL-MCNC: 8.8 MG/DL (ref 8.6–10)
CHLORIDE SERPL-SCNC: 104 MMOL/L (ref 98–107)
COLOR UR AUTO: ABNORMAL
CREAT SERPL-MCNC: 0.81 MG/DL (ref 0.51–0.95)
D DIMER PPP FEU-MCNC: <0.27 UG/ML FEU (ref 0–0.5)
DEPRECATED HCO3 PLAS-SCNC: 24 MMOL/L (ref 22–29)
EGFRCR SERPLBLD CKD-EPI 2021: >90 ML/MIN/1.73M2
EOSINOPHIL # BLD AUTO: ABNORMAL 10*3/UL
EOSINOPHIL NFR BLD AUTO: ABNORMAL %
ERYTHROCYTE [DISTWIDTH] IN BLOOD BY AUTOMATED COUNT: 12.6 % (ref 10–15)
GLUCOSE SERPL-MCNC: 103 MG/DL (ref 70–99)
GLUCOSE UR STRIP-MCNC: NEGATIVE MG/DL
HCT VFR BLD AUTO: 44 % (ref 35–47)
HGB BLD-MCNC: 14.6 G/DL (ref 11.7–15.7)
HGB UR QL STRIP: ABNORMAL
HOLD SPECIMEN: NORMAL
HOLD SPECIMEN: NORMAL
IMM GRANULOCYTES # BLD: ABNORMAL 10*3/UL
IMM GRANULOCYTES NFR BLD: ABNORMAL %
KETONES UR STRIP-MCNC: NEGATIVE MG/DL
LACTATE SERPL-SCNC: 0.8 MMOL/L (ref 0.7–2)
LEUKOCYTE ESTERASE UR QL STRIP: NEGATIVE
LYMPHOCYTES # BLD AUTO: ABNORMAL 10*3/UL
LYMPHOCYTES NFR BLD AUTO: ABNORMAL %
MAGNESIUM SERPL-MCNC: 2 MG/DL (ref 1.7–2.3)
MCH RBC QN AUTO: 29.6 PG (ref 26.5–33)
MCHC RBC AUTO-ENTMCNC: 33.2 G/DL (ref 31.5–36.5)
MCV RBC AUTO: 89 FL (ref 78–100)
MONOCYTES # BLD AUTO: ABNORMAL 10*3/UL
MONOCYTES NFR BLD AUTO: ABNORMAL %
MONOCYTES NFR BLD AUTO: NEGATIVE %
MUCOUS THREADS #/AREA URNS LPF: PRESENT /LPF
NEUTROPHILS # BLD AUTO: ABNORMAL 10*3/UL
NEUTROPHILS NFR BLD AUTO: ABNORMAL %
NITRATE UR QL: NEGATIVE
NRBC # BLD AUTO: 0 10E3/UL
NRBC BLD AUTO-RTO: 0 /100
NT-PROBNP SERPL-MCNC: 103 PG/ML (ref 0–450)
PH UR STRIP: 7 [PH] (ref 5–7)
PLATELET # BLD AUTO: 323 10E3/UL (ref 150–450)
POTASSIUM SERPL-SCNC: 3.9 MMOL/L (ref 3.4–5.3)
PROT SERPL-MCNC: 8.3 G/DL (ref 6.4–8.3)
RBC # BLD AUTO: 4.93 10E6/UL (ref 3.8–5.2)
RBC URINE: 5 /HPF
SODIUM SERPL-SCNC: 139 MMOL/L (ref 135–145)
SP GR UR STRIP: 1.02 (ref 1–1.03)
SQUAMOUS EPITHELIAL: 2 /HPF
TROPONIN T SERPL HS-MCNC: <6 NG/L
UROBILINOGEN UR STRIP-MCNC: <2 MG/DL
WBC # BLD AUTO: 25.9 10E3/UL (ref 4–11)
WBC URINE: <1 /HPF

## 2024-03-06 PROCEDURE — 99214 OFFICE O/P EST MOD 30 MIN: CPT | Mod: 25 | Performed by: STUDENT IN AN ORGANIZED HEALTH CARE EDUCATION/TRAINING PROGRAM

## 2024-03-06 PROCEDURE — 99284 EMERGENCY DEPT VISIT MOD MDM: CPT | Mod: 25

## 2024-03-06 PROCEDURE — 85014 HEMATOCRIT: CPT | Performed by: FAMILY MEDICINE

## 2024-03-06 PROCEDURE — 85379 FIBRIN DEGRADATION QUANT: CPT | Performed by: FAMILY MEDICINE

## 2024-03-06 PROCEDURE — 87040 BLOOD CULTURE FOR BACTERIA: CPT | Performed by: FAMILY MEDICINE

## 2024-03-06 PROCEDURE — 83735 ASSAY OF MAGNESIUM: CPT | Performed by: FAMILY MEDICINE

## 2024-03-06 PROCEDURE — 93005 ELECTROCARDIOGRAM TRACING: CPT | Performed by: STUDENT IN AN ORGANIZED HEALTH CARE EDUCATION/TRAINING PROGRAM

## 2024-03-06 PROCEDURE — 83880 ASSAY OF NATRIURETIC PEPTIDE: CPT | Performed by: FAMILY MEDICINE

## 2024-03-06 PROCEDURE — 83605 ASSAY OF LACTIC ACID: CPT | Performed by: FAMILY MEDICINE

## 2024-03-06 PROCEDURE — 85007 BL SMEAR W/DIFF WBC COUNT: CPT | Performed by: FAMILY MEDICINE

## 2024-03-06 PROCEDURE — 81001 URINALYSIS AUTO W/SCOPE: CPT | Performed by: FAMILY MEDICINE

## 2024-03-06 PROCEDURE — 36415 COLL VENOUS BLD VENIPUNCTURE: CPT | Performed by: FAMILY MEDICINE

## 2024-03-06 PROCEDURE — 93010 ELECTROCARDIOGRAM REPORT: CPT | Performed by: INTERNAL MEDICINE

## 2024-03-06 PROCEDURE — 84484 ASSAY OF TROPONIN QUANT: CPT | Performed by: FAMILY MEDICINE

## 2024-03-06 PROCEDURE — 86308 HETEROPHILE ANTIBODY SCREEN: CPT | Performed by: FAMILY MEDICINE

## 2024-03-06 PROCEDURE — 82248 BILIRUBIN DIRECT: CPT | Performed by: FAMILY MEDICINE

## 2024-03-06 PROCEDURE — 80048 BASIC METABOLIC PNL TOTAL CA: CPT | Performed by: FAMILY MEDICINE

## 2024-03-06 PROCEDURE — 71046 X-RAY EXAM CHEST 2 VIEWS: CPT

## 2024-03-06 ASSESSMENT — COLUMBIA-SUICIDE SEVERITY RATING SCALE - C-SSRS
2. HAVE YOU ACTUALLY HAD ANY THOUGHTS OF KILLING YOURSELF IN THE PAST MONTH?: NO
1. IN THE PAST MONTH, HAVE YOU WISHED YOU WERE DEAD OR WISHED YOU COULD GO TO SLEEP AND NOT WAKE UP?: NO
6. HAVE YOU EVER DONE ANYTHING, STARTED TO DO ANYTHING, OR PREPARED TO DO ANYTHING TO END YOUR LIFE?: NO

## 2024-03-06 ASSESSMENT — ACTIVITIES OF DAILY LIVING (ADL)
ADLS_ACUITY_SCORE: 35
ADLS_ACUITY_SCORE: 35

## 2024-03-06 NOTE — PROGRESS NOTES
Assessment & Plan     Acute chest pain  Other fatigue  - EKG 12-lead, tracing only    4 hours following an episode of lightheadedness, left shoulder pain radiating down to elbow with nausea.  She reports this lasted for approximately 2 hours and her arm still feels weak and achy and side along with generalized fatigue.  Her only cardiac risk is BMI; she has no history of diabetes and does not smoke cigarettes however cholesterol status is unknown.  Unfortunately, we are unable to get troponins back prior to closing this evening.    Exam reassuring against stroke as her upper extremity dermatomes and myotomes are equal bilaterally and CN XII wnl.  Patient denies h/o acid reflux, panic symptoms, recent neck or chest trauma, so unclear of the exact etiology. She does have a history of irregular heart beat but she reports this is different from prior. Eye twitching could have been from an electrolyte imbalance however she has no signs of hyperkalemia on her EKG.     After discussion with Elsy, she would like to go to the emergency department for additional workup. Aside from mild hypertension, she is vitally stable and well appearing so ambulance transfer is not needed; she will present to [place]'s ED promptly.    No follow-ups on file.    Minnie Goff, DO  she/her  [facility]    Subjective     Anijagdeep Aquino is a 38 year old female who presents to clinic today for the following health issues:    HPI  Was driving today at 1pm, eyes both started twitching for a short moment, felt light headed but not dizzy  Went to get food, which helped a little but felt shaky  Around 2pm, was having some pain in L shoulder and pain down to L elbow. Feeling achy, feels numb like arm fell asleep but not pins and needles. This lasted for 2hrs, then went away and came back again about 1hr ago. Felt fatigued more than normal after this    No nausea, vomiting, headache, change in vision  Had an upset stomach but might  be because she had [employer] for lunch  No recent neck trauma  Paternal grandfather had a heart attack, dad had high cholesterol, mom has diabetes    Has a h/o arrhythmia but knows that this feels different    Past Medical History:   Diagnosis Date    Abnormal Pap smear 2009    Irregular heart beat 02/2016    has had stress test, echo and holtor monitoring.       No Known Allergies  Current Outpatient Medications   Medication    DEBLITANE 0.35 MG per tablet     No current facility-administered medications for this visit.      Review of Systems  Constitutional, HEENT, cardiovascular, pulmonary, gi and gu systems are negative, except as otherwise noted.      Objective    /86   Pulse 80   Temp 98.6  F (37  C)   Resp 14   Wt 115.2 kg (254 lb)   LMP 02/18/2024 (Approximate)   SpO2 100%   BMI 36.45 kg/m      Physical Exam  Constitutional:       Appearance: She is obese.   HENT:      Nose: Nose normal.      Mouth/Throat:      Mouth: Mucous membranes are moist.   Eyes:      Pupils: Pupils are equal, round, and reactive to light.   Cardiovascular:      Rate and Rhythm: Normal rate and regular rhythm.      Heart sounds:      No friction rub.   Pulmonary:      Effort: Pulmonary effort is normal.      Breath sounds: No wheezing, rhonchi or rales.   Chest:      Chest wall: No tenderness.   Musculoskeletal:      Right shoulder: Normal.      Left shoulder: No tenderness or bony tenderness. Normal range of motion.      Right upper arm: Normal.      Left upper arm: No swelling, tenderness or bony tenderness.   Neurological:      Mental Status: She is alert and oriented to person, place, and time.      Cranial Nerves: Cranial nerves 2-12 are intact.      Sensory: Sensation is intact.          EKG - Reviewed and interpreted by me appears normal, NSR, Normal Sinus Rhythm, normal axis, normal intervals, no acute ST/T changes c/w ischemia, no LVH by voltage criteria, nonspecific ST-T changes        The use of Dragon/[employer]Mic  dictation services may have been used to construct the content in this note; any grammatical or spelling errors are non-intentional. Please contact the author of this note directly if you are in need of any clarification.

## 2024-03-07 ENCOUNTER — PATIENT OUTREACH (OUTPATIENT)
Dept: ONCOLOGY | Facility: CLINIC | Age: 39
End: 2024-03-07
Payer: COMMERCIAL

## 2024-03-07 LAB
ATRIAL RATE - MUSE: 81 BPM
BASOPHILS # BLD MANUAL: 0 10E3/UL (ref 0–0.2)
BASOPHILS NFR BLD MANUAL: 0 %
DIASTOLIC BLOOD PRESSURE - MUSE: NORMAL MMHG
EOSINOPHIL # BLD MANUAL: 0 10E3/UL (ref 0–0.7)
EOSINOPHIL NFR BLD MANUAL: 0 %
INTERPRETATION ECG - MUSE: NORMAL
LYMPHOCYTES # BLD MANUAL: 15.5 10E3/UL (ref 0.8–5.3)
LYMPHOCYTES NFR BLD MANUAL: 60 %
MONOCYTES # BLD MANUAL: 1 10E3/UL (ref 0–1.3)
MONOCYTES NFR BLD MANUAL: 4 %
NEUTROPHILS # BLD MANUAL: 9.3 10E3/UL (ref 1.6–8.3)
NEUTROPHILS NFR BLD MANUAL: 36 %
P AXIS - MUSE: 32 DEGREES
PATH REV: ABNORMAL
PLAT MORPH BLD: ABNORMAL
PR INTERVAL - MUSE: 136 MS
QRS DURATION - MUSE: 82 MS
QT - MUSE: 392 MS
QTC - MUSE: 455 MS
R AXIS - MUSE: 48 DEGREES
RBC MORPH BLD: ABNORMAL
SYSTOLIC BLOOD PRESSURE - MUSE: NORMAL MMHG
T AXIS - MUSE: 58 DEGREES
VARIANT LYMPHS BLD QL SMEAR: PRESENT
VENTRICULAR RATE- MUSE: 81 BPM

## 2024-03-07 NOTE — ED TRIAGE NOTES
Sent here from walk in clinic across the street for blood test to rule out MI.  Was driving at 1300, felt lightheaded, eyes twitching, shaky. Ate something, felt better. Went back to work and then developed left arm pain around 1400. Went home due to stomachache, diarrhea x1. Then was feeling really tired so went into walk in clinic. Provider there ruled out stroke, but wanted blood testing to rule out MI. Continues to have left arm pain. No CP/sob at this time.

## 2024-03-07 NOTE — ED PROVIDER NOTES
EMERGENCY DEPARTMENT ENCOUNTER      NAME: Ani Aquino  AGE: 38 year old female  YOB: 1985  MRN: [id number]  EVALUATION DATE & TIME: 3/6/2024  6:46 PM    PCP: Isiah Anderson    ED PROVIDER: Mahendra Sterling M.D.    Chief Complaint   Patient presents with    Arm Pain    Referral       FINAL IMPRESSION:  1. Leukocytosis, unspecified type    2. Lymphocytosis    3. Lightheadedness    4. Pain of left upper arm        ED COURSE & MEDICAL DECISION MAKING:    Pertinent Labs & Imaging studies independently interpreted by me. (See chart for details)  7:08 PM  Patient seen and examined, reviewed prior office visit from earlier today when patient noted with chest pain and was referred to the emergency department, EKG independently reviewed by me from that visit performed at 6:55 PM demonstrates slight ST depression with one PVC but no acute ischemic changes, no prior for comparison.  Patient presents today with abrupt onset of lightheadedness and some residual left arm and shoulder pain.  Initially seen clinic and referred to the emergency department.  Patient denies abdominal pain, vomiting, nausea, diarrhea, cough, shortness of breath.  On initial exam, borderline tachycardia although is improved once patient was done telling me about her symptoms.  No focal findings on exam.  Labs ordered.  7:32 PM labs independently interpreted by me with marked leukocytosis white blood cell count of 25.9, basic metabolic panel normal.  Due to episode of lightheadedness and borderline tachycardia along with leukocytosis, concern for infection.  Chest x-ray and additional labs are ordered to evaluate.  Patient reexamined, no abdominal tenderness on exam and lungs are clear.  7:42 PM differential results discussed with lab, predominant lymphocytes.  Possible for proliferative disorder, Monospot ordered, also D-dimer given abrupt onset of chest pain and shoulder pain in setting of of lymphoproliferative disorder.   Consider repeat EKG but this was just done in clinic and does not need to be redone.  8:26 PM labs ordered and independently interpreted by me with negative troponin, normal lactate, normal BNP, normal basic panel.  D-dimer is negative, chest x-ray negative.  No definite etiology for patient's symptoms found today.  She does have an incidentally noted leukocytosis and lymphocytosis and will follow-up with hematology for this.  Patient is otherwise for stable for discharge.    At the conclusion of the encounter I discussed the results of all of the tests and the disposition. The questions were answered. The patient or family acknowledged understanding and was agreeable with the care plan.     Medical Decision Making  Obtained supplemental history:Supplemental history obtained?: Documented in chart  Reviewed external records: External records reviewed?: Documented in chart  Care impacted by chronic illness:N/A  Care significantly affected by social determinants of health:Access to Affordable Health Care  Did you consider but not order tests?: Work up considered but not performed and documented in chart, if applicable  Did you interpret images independently?: Independent interpretation of ECG and images noted in documentation, when applicable.  Consultation discussion with other provider:Did you involve another provider (consultant, , pharmacy, etc.)?: No  Discharge. No recommendations on prescription strength medication(s). I considered admission, but discharged patient after significant clinical improvement.    MEDICATIONS GIVEN IN THE EMERGENCY:  Medications - No data to display    NEW PRESCRIPTIONS STARTED AT TODAY'S ER VISIT  New Prescriptions    No medications on file       =================================================================    HPI    Patient information was obtained from: patient      Ani Aquino is a 38 year old female who presents today with an episode of lightheadedness and left shoulder  pain earlier this afternoon.  Abrupt onset, pain is now resolved.  Initially evaluated at clinic and sent to the emergency department for further evaluation.  Patient is feeling better now.  REVIEW OF SYSTEMS   Review of Systems   All other systems reviewed and negative    PAST MEDICAL HISTORY:  Past Medical History:   Diagnosis Date    Abnormal Pap smear 2009    Irregular heart beat 2016    has had stress test, echo and holtor monitoring.         PAST SURGICAL HISTORY:  Past Surgical History:   Procedure Laterality Date     SECTION      low transverse     SECTION         CURRENT MEDICATIONS:    No current facility-administered medications for this encounter.     Current Outpatient Medications   Medication    DEBLITANE 0.35 MG per tablet       ALLERGIES:  No Known Allergies    FAMILY HISTORY:  Family History   Problem Relation Age of Onset    Diabetes Maternal Aunt     Diabetes Maternal Uncle     Coronary Artery Disease Paternal Grandmother     Other Cancer Paternal Grandmother     Coronary Artery Disease Paternal Grandfather     Hypertension Father     Breast Cancer Mother 38.00    Depression Maternal Grandfather     Other Cancer Maternal Grandfather     Asthma Maternal Grandfather     Osteoporosis Maternal Grandfather     Hyperlipidemia Father     Diabetes Maternal Grandmother     Heart Disease Paternal Grandmother     Hypertension Paternal Grandmother     Heart Disease Paternal Grandfather     Hypertension Paternal Grandfather     Cerebrovascular Disease No family hx of     Clotting Disorder No family hx of        SOCIAL HISTORY:   Social History     Socioeconomic History    Marital status:      Spouse name: Moody    Number of children: 2   Occupational History    Occupation: teacher     Employer: [employer]   Tobacco Use    Smoking status: Never    Smokeless tobacco: Never   Substance and Sexual Activity    Alcohol use: No     Alcohol/week: 0.0 standard drinks of alcohol    Drug use: No     Sexual activity: Yes     Partners: Male     Birth control/protection: None   Other Topics Concern     Service No    Blood Transfusions No    Caffeine Concern Yes     Comment: 1 soda    Occupational Exposure No    Hobby Hazards No    Sleep Concern No    Stress Concern No    Weight Concern No    Special Diet No    Back Care No    Exercise Yes     Comment: every day 30 mintues    Bike Helmet Yes    Seat Belt Yes    Self-Exams No   Social History Narrative    How much exercise per week? Walking 1-2 times weekly     How much calcium per day? 2 servings daily       How much caffeine per day? 1 can daily     How much vitamin D per day? No     Do you/your family wear seatbelts? Yes     Do you/your family use safety helmets? NA     Do you/your family use sunscreen? No     Do you/your family keep firearms in the home? No    Do you/your family have a smoke detector(s)? Yes        JENNIFER Willis CNM    5/23/19         Social Determinants of Health     Financial Resource Strain: Low Risk  (1/14/2024)    Financial Resource Strain     Within the past 12 months, have you or your family members you live with been unable to get utilities (heat, electricity) when it was really needed?: No   Food Insecurity: Low Risk  (1/14/2024)    Food Insecurity     Within the past 12 months, did you worry that your food would run out before you got money to buy more?: No     Within the past 12 months, did the food you bought just not last and you didn t have money to get more?: No   Transportation Needs: Low Risk  (1/14/2024)    Transportation Needs     Within the past 12 months, has lack of transportation kept you from medical appointments, getting your medicines, non-medical meetings or appointments, work, or from getting things that you need?: No   Interpersonal Safety: Low Risk  (1/18/2024)    Interpersonal Safety     Do you feel physically and emotionally safe where you currently live?: Yes     Within the past 12  "months, have you been hit, slapped, kicked or otherwise physically hurt by someone?: No     Within the past 12 months, have you been humiliated or emotionally abused in other ways by your partner or ex-partner?: No   Housing Stability: Low Risk  (1/14/2024)    Housing Stability     Do you have housing? : Yes     Are you worried about losing your housing?: No       VITALS:  BP (!) 147/77   Pulse 87   Temp 97.5  F (36.4  C) (Oral)   Resp 20   Ht 1.803 m (5' 11\")   Wt 113.4 kg (250 lb)   LMP 02/18/2024 (Approximate)   SpO2 99%   BMI 34.87 kg/m      PHYSICAL EXAM:  Physical Exam  Vitals and nursing note reviewed.   Constitutional:       Appearance: Normal appearance.   HENT:      Head: Normocephalic and atraumatic.      Right Ear: External ear normal.      Left Ear: External ear normal.      Nose: Nose normal.      Mouth/Throat:      Mouth: Mucous membranes are moist.   Eyes:      Extraocular Movements: Extraocular movements intact.      Conjunctiva/sclera: Conjunctivae normal.      Pupils: Pupils are equal, round, and reactive to light.   Cardiovascular:      Rate and Rhythm: Normal rate and regular rhythm.   Pulmonary:      Effort: Pulmonary effort is normal.      Breath sounds: Normal breath sounds. No wheezing or rales.   Abdominal:      General: Abdomen is flat. There is no distension.      Palpations: Abdomen is soft.      Tenderness: There is no abdominal tenderness. There is no guarding.   Musculoskeletal:         General: Normal range of motion.      Cervical back: Normal range of motion and neck supple.      Right lower leg: No edema.      Left lower leg: No edema.   Lymphadenopathy:      Cervical: No cervical adenopathy.   Skin:     General: Skin is warm and dry.   Neurological:      General: No focal deficit present.      Mental Status: She is alert and oriented to person, place, and time. Mental status is at baseline.      Comments: No gross focal neurologic deficits   Psychiatric:         Mood and " Affect: Mood normal.         Behavior: Behavior normal.         Thought Content: Thought content normal.          LAB:  All pertinent labs reviewed and interpreted.  Results for orders placed or performed during the hospital encounter of 03/06/24   Chest XR,  PA & LAT    Impression    IMPRESSION: Heart size and pulmonary vascularity normal. The lungs are clear.   Basic metabolic panel   Result Value Ref Range    Sodium 139 135 - 145 mmol/L    Potassium 3.9 3.4 - 5.3 mmol/L    Chloride 104 98 - 107 mmol/L    Carbon Dioxide (CO2) 24 22 - 29 mmol/L    Anion Gap 11 7 - 15 mmol/L    Urea Nitrogen 9.5 6.0 - 20.0 mg/dL    Creatinine 0.81 0.51 - 0.95 mg/dL    GFR Estimate >90 >60 mL/min/1.73m2    Calcium 8.8 8.6 - 10.0 mg/dL    Glucose 103 (H) 70 - 99 mg/dL   Result Value Ref Range    Troponin T, High Sensitivity <6 <=14 ng/L   CBC with platelets and differential   Result Value Ref Range    WBC Count 25.9 (H) 4.0 - 11.0 10e3/uL    RBC Count 4.93 3.80 - 5.20 10e6/uL    Hemoglobin 14.6 11.7 - 15.7 g/dL    Hematocrit 44.0 35.0 - 47.0 %    MCV 89 78 - 100 fL    MCH 29.6 26.5 - 33.0 pg    MCHC 33.2 31.5 - 36.5 g/dL    RDW 12.6 10.0 - 15.0 %    Platelet Count 323 150 - 450 10e3/uL    % Neutrophils      % Lymphocytes      % Monocytes      % Eosinophils      % Basophils      % Immature Granulocytes      Absolute Neutrophils      Absolute Lymphocytes      Absolute Monocytes      Absolute Eosinophils      Absolute Basophils      Absolute Immature Granulocytes     Extra Blue Top Tube   Result Value Ref Range    Hold Specimen JIC    Extra Red Top Tube   Result Value Ref Range    Hold Specimen JIC    N terminal pro BNP outpatient   Result Value Ref Range    N Terminal Pro BNP Outpatient 103 0 - 450 pg/mL   UA with Microscopic reflex to Culture    Specimen: Urine, Clean Catch   Result Value Ref Range    Color Urine Light Yellow Colorless, Straw, Light Yellow, Yellow    Appearance Urine Clear Clear    Glucose Urine Negative Negative mg/dL     Bilirubin Urine Negative Negative    Ketones Urine Negative Negative mg/dL    Specific Gravity Urine 1.019 1.001 - 1.030    Blood Urine 0.1 mg/dL (A) Negative    pH Urine 7.0 5.0 - 7.0    Protein Albumin Urine Negative Negative mg/dL    Urobilinogen Urine <2.0 <2.0 mg/dL    Nitrite Urine Negative Negative    Leukocyte Esterase Urine Negative Negative    Mucus Urine Present (A) None Seen /LPF    RBC Urine 5 (H) <=2 /HPF    WBC Urine <1 <=5 /HPF    Squamous Epithelials Urine 2 (H) <=1 /HPF   Lactic acid whole blood   Result Value Ref Range    Lactic Acid 0.8 0.7 - 2.0 mmol/L   Result Value Ref Range    Mononucleosis Screen Negative Negative   D dimer quantitative   Result Value Ref Range    D-Dimer Quantitative <0.27 0.00 - 0.50 ug/mL FEU       RADIOLOGY:  Reviewed all pertinent imaging. Please see official radiology report.  Chest XR,  PA & LAT   Final Result   IMPRESSION: Heart size and pulmonary vascularity normal. The lungs are clear.            Mahendra Sterling M.D.  Emergency Medicine  [facility] EMERGENCY DEPARTMENT  [facility] [facility] [id number] [phone]  Dept: [phone]       Mahendra Sterling MD  03/06/24 2035

## 2024-03-07 NOTE — ED NOTES
Pt discharged ambulatory to home at 2100. All questions answered. Pt expressed understanding of info

## 2024-03-07 NOTE — TELEPHONE ENCOUNTER
RECORDS STATUS - ALL OTHER DIAGNOSIS      RECORDS RECEIVED FROM: [facility] - Internal records   DATE RECEIVED: \3/7

## 2024-03-07 NOTE — PROGRESS NOTES
[facility]: Hematology                                                                Hem/Onc  Referral reviewed     Adult Oncology/Hematology  Referral [[id number]]  Ordering user: Mahendra Sterling MD 03/06/24 2033 Ordering provider: Mahendra Sterling MD     Referral Details  Referred By  Referred To   Mahendra Sterling MD   [address]   [facility] [address]   Phone: [phone]   Fax: [phone]    Diagnoses: Leukocytosis, unspecified type   Lymphocytosis   Order: Adult Oncology/Hematology  Referral    Hematology/Oncology  referral       Order Questions    Question Answer   My Clinical Question Is: New onset leukocytosis and lymphocytosis with no definite infectious etiology   Reason for Referral: Hematology   Hematology type: Unknown     ASSESSMENT      Clinical History (per Nurse review of records provided):    38 year old female patient referred as above from [facility] Emergency Department after presenting for       Chief Complaint   Patient presents with    Arm Pain    Referral       Residence:  [facility]    PCP: Isiah Anderson  Records Location: [facility]   Pertinent labs -- BOOKMARKED  Referring provider note(s)-- BOOKMARKED    INTERVENTION(S)                                                      March 7, 2024  OUTGOING CALL to pt, no answer. Left voicemail introducing my role as nurse navigator with [facility] [place] and that we have recd the referral to hematology. Requested callback to number below (Mon - Fri 8am - 4:30pm) to speak to a  to set the consult date/time/location. Explained to pt that she will also receive a call from our scheduling intake team in the next 1-2 business days     -Referral Triage Scheduling Instructions added to Hem/Onc  referral for Patient Access rep ( number below, hours are Monday - Friday 8am - 4:30 pm) who will contact pt in the next 1-2 business  days to schedule the consultation.    PLAN                                                      Hematology consult    See records team's Pre-Visit encounter documentation for additional records retrieval information.    Zee Richter, RN, BSN, OCN  Hematology/Oncology New Patient Nurse Navigator   [facility] [place] [facility]  [phone]

## 2024-03-08 ENCOUNTER — ONCOLOGY VISIT (OUTPATIENT)
Dept: ONCOLOGY | Facility: HOSPITAL | Age: 39
End: 2024-03-08
Attending: FAMILY MEDICINE
Payer: COMMERCIAL

## 2024-03-08 ENCOUNTER — PRE VISIT (OUTPATIENT)
Dept: ONCOLOGY | Facility: HOSPITAL | Age: 39
End: 2024-03-08
Payer: COMMERCIAL

## 2024-03-08 ENCOUNTER — LAB (OUTPATIENT)
Dept: INFUSION THERAPY | Facility: HOSPITAL | Age: 39
End: 2024-03-08
Attending: FAMILY MEDICINE
Payer: COMMERCIAL

## 2024-03-08 VITALS
RESPIRATION RATE: 16 BRPM | HEART RATE: 62 BPM | SYSTOLIC BLOOD PRESSURE: 132 MMHG | OXYGEN SATURATION: 99 % | BODY MASS INDEX: 35.84 KG/M2 | TEMPERATURE: 98.2 F | WEIGHT: 256 LBS | DIASTOLIC BLOOD PRESSURE: 88 MMHG | HEIGHT: 71 IN

## 2024-03-08 DIAGNOSIS — D72.829 LEUKOCYTOSIS, UNSPECIFIED TYPE: ICD-10-CM

## 2024-03-08 DIAGNOSIS — D72.820 LYMPHOCYTOSIS: ICD-10-CM

## 2024-03-08 LAB
BASOPHILS # BLD AUTO: ABNORMAL 10*3/UL
BASOPHILS # BLD MANUAL: 0 10E3/UL (ref 0–0.2)
BASOPHILS NFR BLD AUTO: ABNORMAL %
BASOPHILS NFR BLD MANUAL: 0 %
EOSINOPHIL # BLD AUTO: ABNORMAL 10*3/UL
EOSINOPHIL # BLD MANUAL: 0 10E3/UL (ref 0–0.7)
EOSINOPHIL NFR BLD AUTO: ABNORMAL %
EOSINOPHIL NFR BLD MANUAL: 0 %
ERYTHROCYTE [DISTWIDTH] IN BLOOD BY AUTOMATED COUNT: 12.8 % (ref 10–15)
HCT VFR BLD AUTO: 41.6 % (ref 35–47)
HGB BLD-MCNC: 13.8 G/DL (ref 11.7–15.7)
IMM GRANULOCYTES # BLD: ABNORMAL 10*3/UL
IMM GRANULOCYTES NFR BLD: ABNORMAL %
LDH SERPL L TO P-CCNC: 175 U/L (ref 0–250)
LYMPHOCYTES # BLD AUTO: ABNORMAL 10*3/UL
LYMPHOCYTES # BLD MANUAL: 12.9 10E3/UL (ref 0.8–5.3)
LYMPHOCYTES NFR BLD AUTO: ABNORMAL %
LYMPHOCYTES NFR BLD MANUAL: 61 %
MCH RBC QN AUTO: 29.7 PG (ref 26.5–33)
MCHC RBC AUTO-ENTMCNC: 33.2 G/DL (ref 31.5–36.5)
MCV RBC AUTO: 90 FL (ref 78–100)
MONOCYTES # BLD AUTO: ABNORMAL 10*3/UL
MONOCYTES # BLD MANUAL: 2.3 10E3/UL (ref 0–1.3)
MONOCYTES NFR BLD AUTO: ABNORMAL %
MONOCYTES NFR BLD MANUAL: 11 %
NEUTROPHILS # BLD AUTO: ABNORMAL 10*3/UL
NEUTROPHILS # BLD MANUAL: 5.9 10E3/UL (ref 1.6–8.3)
NEUTROPHILS NFR BLD AUTO: ABNORMAL %
NEUTROPHILS NFR BLD MANUAL: 28 %
NRBC # BLD AUTO: 0 10E3/UL
NRBC BLD AUTO-RTO: 0 /100
PLAT MORPH BLD: ABNORMAL
PLATELET # BLD AUTO: 302 10E3/UL (ref 150–450)
RBC # BLD AUTO: 4.64 10E6/UL (ref 3.8–5.2)
RBC MORPH BLD: ABNORMAL
RETICS # AUTO: 0.07 10E6/UL (ref 0.03–0.1)
RETICS/RBC NFR AUTO: 1.5 % (ref 0.5–2)
WBC # BLD AUTO: 21.2 10E3/UL (ref 4–11)

## 2024-03-08 PROCEDURE — 88189 FLOWCYTOMETRY/READ 16 & >: CPT | Mod: GC | Performed by: PATHOLOGY

## 2024-03-08 PROCEDURE — 99203 OFFICE O/P NEW LOW 30 MIN: CPT | Performed by: INTERNAL MEDICINE

## 2024-03-08 PROCEDURE — 99207 BLOOD MORPHOLOGY PATHOLOGIST REVIEW: CPT | Performed by: PATHOLOGY

## 2024-03-08 PROCEDURE — G2211 COMPLEX E/M VISIT ADD ON: HCPCS | Performed by: INTERNAL MEDICINE

## 2024-03-08 PROCEDURE — 88185 FLOWCYTOMETRY/TC ADD-ON: CPT | Performed by: INTERNAL MEDICINE

## 2024-03-08 PROCEDURE — 83615 LACTATE (LD) (LDH) ENZYME: CPT

## 2024-03-08 PROCEDURE — 99213 OFFICE O/P EST LOW 20 MIN: CPT | Performed by: INTERNAL MEDICINE

## 2024-03-08 PROCEDURE — 85045 AUTOMATED RETICULOCYTE COUNT: CPT

## 2024-03-08 PROCEDURE — 36415 COLL VENOUS BLD VENIPUNCTURE: CPT

## 2024-03-08 PROCEDURE — 85027 COMPLETE CBC AUTOMATED: CPT

## 2024-03-08 PROCEDURE — 85007 BL SMEAR W/DIFF WBC COUNT: CPT

## 2024-03-08 ASSESSMENT — PAIN SCALES - GENERAL: PAINLEVEL: NO PAIN (0)

## 2024-03-08 NOTE — PROGRESS NOTES
"Oncology Rooming Note    March 8, 2024 2:23 PM   Ani Aquino is a 38 year old female who presents for:    Chief Complaint   Patient presents with    Hematology     Leukocytosis      Initial Vitals: /88   Pulse 62   Temp 98.2  F (36.8  C)   Resp 16   Ht 1.803 m (5' 11\")   Wt 116.1 kg (256 lb)   LMP 02/18/2024 (Approximate)   SpO2 99%   BMI 35.70 kg/m   Estimated body mass index is 35.7 kg/m  as calculated from the following:    Height as of this encounter: 1.803 m (5' 11\").    Weight as of this encounter: 116.1 kg (256 lb). Body surface area is 2.41 meters squared.  No Pain (0) Comment: Data Unavailable   Patient's last menstrual period was 02/18/2024 (approximate).  Allergies reviewed: Yes  Medications reviewed: Yes    Medications: Medication refills not needed today.  Pharmacy name entered into [facility]:    [facility] #[id number] - [address] AT [facility] [address]  [facility] [address]  [facility] #[id number] - [address] AT [facility] [address] [facility]    Frailty Screening:   Is the patient here for a new oncology consult visit in cancer care? 2. No      Clinical concerns: None      Desire Witt LPN             "

## 2024-03-08 NOTE — PROGRESS NOTES
[facility] Hematology and Oncology Consult Note    Patient: Ani Aquino  MRN: [id number]  Date of Service: 03/08/2024      Reason for Visit    Chief Complaint   Patient presents with    Hematology     Leukocytosis          Assessment/Plan    Problem List Items Addressed This Visit    None  Visit Diagnoses       Leukocytosis, unspecified type        Lymphocytosis                Leukocytosis with absolute lymphocytosis  Erythematous skin rash  Positive BELINDA  Cervical and supraclavicular lymphadenopathy  I have reviewed her chart in detail.  She presented to the ER a couple days ago with chest tightness and left arm pain along with feeling weak and lightheaded.  Symptoms resolved pretty quickly.  ACS ruled out.  However CBC showed significantly elevated total white count at 24,000 which was predominantly lymphocytes.  ALC was 15.5.  ANC was 9.2.  No evidence of any left shift or abnormal cells.  No basophilia.  Serum chemistry was completely normal.  On clinical exam today she does have a supraclavicular and cervical lymphadenopathy on both sides.  According to her these have been there for a month or so.  They are been slowly growing.  No B symptoms.  I reviewed the different etiologies of lymphocytosis.  Infectious versus noninfectious causes.  She was tested for mononucleosis and was negative.  Hep C and HIV testing in the past have been negative.  She has no history of travel outside the country.      In this situation the etiology is broad.  However I think we need to rule out a possible clonal lymphocytosis.  The presence of lymphadenopathy is a little bit worrying but clinically she is not behaving like having an malignant process, but I cannot completely rule this out.  She also has positive BELINDA in the past and has a erythematous rash on her chest.  So potentially this could be reactive lymphadenopathy to an underlying autoimmune problem.  This is also on the basis that reactive lymphocytes were seen on  the differential.  So my plan is to recheck her CBC today along with the peripheral smear and flow cytometry.  Will also check LDH.  If further laboratory comes back showing clonal B cells then I think we should expand our workup including getting a CT chest abdomen pelvis and potentially a bone marrow biopsy.  If this is reactive lymphocytosis only then typically it should resolve once the underlying issue gets better.  So in that situation we may have to look into potential infectious causes like fungal infection or other chronic infections.    She is agreeable to the plan.  Will get labs today.  I will see her back in 2 weeks as a virtual visit to discuss the results and make further follow-up plans.      ECOG Performance    0 - Independent    Problem List    Patient Active Problem List   Diagnosis    History of     Irregular heart beat    Hip pain, right    SI (sacroiliac) joint dysfunction    Palpitations     ______________________________________________________________________________    Staging History    Cancer Staging   No matching staging information was found for the patient.        History of presenting illness:  Ani Aquino and 38-year-old female who has been referred by [facility] medicine for further evaluation management of obstruction for cytosis.    She was seen in the ER couple days ago after she presented with chest and left arm tightness along with feeling weak and lightheaded.  Symptoms started a few hours before the presentation.  And the quickly subsided.  No antecedent fever or other signs of infection.  No recent sore throat.  She had noticed some enlarged lymph nodes in her neck which have been slowing globally over the last couple of months.  Denies any drenching night sweats.  No significant weight loss.  No abdominal pain or bloating.  In the ER she was evaluated for possible cardiac nature of the chest tightness and left arm pain and ACS was ruled out.  Troponin's were  normal.  Serum chemistry was normal.  However CBC showed significantly elevated total white count at 24,000 which was predominantly lymphocytes with an absolute lymphocyte count of 15.5 K.  ANC was also up at 9.2.  Blood cultures were negative.  Urine was negative for any obvious infection.  She was discharged home and was referred here for further evaluation.  On the differential count it was noted that she had reactive lymphocytes.  But she denies having any recent infections.  Monotest was negative.  Previously hep C and HIV testing have been negative.  Non-smoker.  No significant alcohol use.  No travel outside the country.  Denies any cough or shortness of breath or other respiratory symptoms.  No new medications started recently.  She does have a rash on her upper chest which has been there for a long time and she reports that this happens whenever she gets stressed or exposed to sunlight or heat.  She had a positive BELINDA in the past at 1: 160.  Speckled pattern.  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  She takes antihistamines as needed.      Past History    Past Medical History:   Diagnosis Date    Abnormal Pap smear 2009    Irregular heart beat 02/2016    has had stress test, echo and holtor monitoring.      Family History   Problem Relation Age of Onset    Diabetes Maternal Aunt     Diabetes Maternal Uncle     Coronary Artery Disease Paternal Grandmother     Other Cancer Paternal Grandmother     Coronary Artery Disease Paternal Grandfather     Hypertension Father     Breast Cancer Mother 38.00    Depression Maternal Grandfather     Other Cancer Maternal Grandfather     Asthma Maternal Grandfather     Osteoporosis Maternal Grandfather     Hyperlipidemia Father     Diabetes Maternal Grandmother     Heart Disease Paternal Grandmother     Hypertension Paternal Grandmother     Heart  Disease Paternal Grandfather     Hypertension Paternal Grandfather     Cerebrovascular Disease No family hx of     Clotting Disorder No family hx of       Past Surgical History:   Procedure Laterality Date     SECTION      low transverse     SECTION      Social History     Socioeconomic History    Marital status:      Spouse name: Moody    Number of children: 2    Years of education: Not on file    Highest education level: Not on file   Occupational History    Occupation: teacher     Employer: [employer]   Tobacco Use    Smoking status: Never    Smokeless tobacco: Never   Substance and Sexual Activity    Alcohol use: No     Alcohol/week: 0.0 standard drinks of alcohol    Drug use: No    Sexual activity: Yes     Partners: Male     Birth control/protection: None   Other Topics Concern     Service No    Blood Transfusions No    Caffeine Concern Yes     Comment: 1 soda    Occupational Exposure No    Hobby Hazards No    Sleep Concern No    Stress Concern No    Weight Concern No    Special Diet No    Back Care No    Exercise Yes     Comment: every day 30 mintues    Bike Helmet Yes    Seat Belt Yes    Self-Exams No   Social History Narrative    How much exercise per week? Walking 1-2 times weekly     How much calcium per day? 2 servings daily       How much caffeine per day? 1 can daily     How much vitamin D per day? No     Do you/your family wear seatbelts? Yes     Do you/your family use safety helmets? NA     Do you/your family use sunscreen? No     Do you/your family keep firearms in the home? No    Do you/your family have a smoke detector(s)? Yes        JENNIFER Willis CNM    21         Social Determinants of Health     Financial Resource Strain: Low Risk  (2024)    Financial Resource Strain     Within the past 12 months, have you or your family members you live with been unable to get utilities (heat, electricity) when it was really needed?: No   Food Insecurity: Low  Risk  (1/14/2024)    Food Insecurity     Within the past 12 months, did you worry that your food would run out before you got money to buy more?: No     Within the past 12 months, did the food you bought just not last and you didn t have money to get more?: No   Transportation Needs: Low Risk  (1/14/2024)    Transportation Needs     Within the past 12 months, has lack of transportation kept you from medical appointments, getting your medicines, non-medical meetings or appointments, work, or from getting things that you need?: No   Physical Activity: Not on file   Stress: Not on file   Social Connections: Not on file   Interpersonal Safety: Low Risk  (1/18/2024)    Interpersonal Safety     Do you feel physically and emotionally safe where you currently live?: Yes     Within the past 12 months, have you been hit, slapped, kicked or otherwise physically hurt by someone?: No     Within the past 12 months, have you been humiliated or emotionally abused in other ways by your partner or ex-partner?: No   Housing Stability: Low Risk  (1/14/2024)    Housing Stability     Do you have housing? : Yes     Are you worried about losing your housing?: No        Allergies    No Known Allergies    Review of Systems    Pertinent items are noted in HPI.      Physical Exam        3/6/2024     8:30 PM   Oncology Vitals   /66   Pulse 92   SpO2 98 %       General: alert and cooperative  HEENT: Head: Normal, normocephalic, atraumatic.  Eye: Normal external eye, conjunctiva, lids cornea, TAMMY.  Chest: Clear to auscultation bilaterally  Cardiac: S1, S2 normal, regular rate and rhythm  Abdomen: abdomen is soft without significant tenderness, splenomegaly could not be appreciated  Extremities: atraumatic, no peripheral edema  Skin: Erythematous rash noted on the chest.  CNS: Alert and oriented x3, neurologic exam grossly normal.  Lymphatics: Cervical and supraclavicular adenopathy noted right more than left.  She has firm right  supraclavicular and cervical lymph nodes.  No clinically significant axillary adenopathy on today's exam.        Lab Results    Recent Results (from the past 168 hour(s))   EKG 12-lead, tracing only   Result Value Ref Range    Systolic Blood Pressure  mmHg    Diastolic Blood Pressure  mmHg    Ventricular Rate 81 BPM    Atrial Rate 81 BPM    ME Interval 136 ms    QRS Duration 82 ms     ms    QTc 455 ms    P Axis 32 degrees    R AXIS 48 degrees    T Axis 58 degrees    Interpretation ECG       Sinus rhythm with occasional Premature ventricular complexes  Nonspecific ST abnormality  Abnormal ECG  No previous ECGs available  Confirmed by FALGUNI NORMAN MD LOC: ([id number]) on 3/7/2024 8:49:45 AM     Basic metabolic panel   Result Value Ref Range    Sodium 139 135 - 145 mmol/L    Potassium 3.9 3.4 - 5.3 mmol/L    Chloride 104 98 - 107 mmol/L    Carbon Dioxide (CO2) 24 22 - 29 mmol/L    Anion Gap 11 7 - 15 mmol/L    Urea Nitrogen 9.5 6.0 - 20.0 mg/dL    Creatinine 0.81 0.51 - 0.95 mg/dL    GFR Estimate >90 >60 mL/min/1.73m2    Calcium 8.8 8.6 - 10.0 mg/dL    Glucose 103 (H) 70 - 99 mg/dL   Troponin T, High Sensitivity   Result Value Ref Range    Troponin T, High Sensitivity <6 <=14 ng/L   CBC with platelets and differential   Result Value Ref Range    WBC Count 25.9 (H) 4.0 - 11.0 10e3/uL    RBC Count 4.93 3.80 - 5.20 10e6/uL    Hemoglobin 14.6 11.7 - 15.7 g/dL    Hematocrit 44.0 35.0 - 47.0 %    MCV 89 78 - 100 fL    MCH 29.6 26.5 - 33.0 pg    MCHC 33.2 31.5 - 36.5 g/dL    RDW 12.6 10.0 - 15.0 %    Platelet Count 323 150 - 450 10e3/uL    % Neutrophils      % Lymphocytes      % Monocytes      % Eosinophils      % Basophils      % Immature Granulocytes      NRBCs per 100 WBC 0 <1 /100    Absolute Neutrophils      Absolute Lymphocytes      Absolute Monocytes      Absolute Eosinophils      Absolute Basophils      Absolute Immature Granulocytes      Absolute NRBCs 0.0 10e3/uL   Extra Blue Top Tube   Result Value Ref Range     Hold Specimen JIC    Extra Red Top Tube   Result Value Ref Range    Hold Specimen JIC    N terminal pro BNP outpatient   Result Value Ref Range    N Terminal Pro BNP Outpatient 103 0 - 450 pg/mL   Magnesium   Result Value Ref Range    Magnesium 2.0 1.7 - 2.3 mg/dL   Hepatic function panel   Result Value Ref Range    Protein Total 8.3 6.4 - 8.3 g/dL    Albumin 4.6 3.5 - 5.2 g/dL    Bilirubin Total 0.5 <=1.2 mg/dL    Alkaline Phosphatase 107 40 - 150 U/L    AST 23 0 - 45 U/L    ALT 21 0 - 50 U/L    Bilirubin Direct <0.20 0.00 - 0.30 mg/dL   Mononucleosis screen   Result Value Ref Range    Mononucleosis Screen Negative Negative   D dimer quantitative   Result Value Ref Range    D-Dimer Quantitative <0.27 0.00 - 0.50 ug/mL FEU   Manual Differential   Result Value Ref Range    % Neutrophils 36 %    % Lymphocytes 60 %    % Monocytes 4 %    % Eosinophils 0 %    % Basophils 0 %    Absolute Neutrophils 9.3 (H) 1.6 - 8.3 10e3/uL    Absolute Lymphocytes 15.5 (H) 0.8 - 5.3 10e3/uL    Absolute Monocytes 1.0 0.0 - 1.3 10e3/uL    Absolute Eosinophils 0.0 0.0 - 0.7 10e3/uL    Absolute Basophils 0.0 0.0 - 0.2 10e3/uL    RBC Morphology Confirmed RBC Indices     Platelet Assessment  Automated Count Confirmed. Platelet morphology is normal.     Automated Count Confirmed. Platelet morphology is normal.    Reactive Lymphocytes Present (A) None Seen    Pathologist Review Comments (Blood)       Slide reviewed by Dr. Annie Vora M.D.   absolute lymphocytosis   Lactic acid whole blood   Result Value Ref Range    Lactic Acid 0.8 0.7 - 2.0 mmol/L   Blood Culture Peripheral Blood    Specimen: Peripheral Blood   Result Value Ref Range    Culture No growth after 1 day    Blood Culture Line, venous    Specimen: Line, venous; Blood   Result Value Ref Range    Culture No growth after 1 day    UA with Microscopic reflex to Culture    Specimen: Urine, Clean Catch   Result Value Ref Range    Color Urine Light Yellow Colorless, Straw, Light  Yellow, Yellow    Appearance Urine Clear Clear    Glucose Urine Negative Negative mg/dL    Bilirubin Urine Negative Negative    Ketones Urine Negative Negative mg/dL    Specific Gravity Urine 1.019 1.001 - 1.030    Blood Urine 0.1 mg/dL (A) Negative    pH Urine 7.0 5.0 - 7.0    Protein Albumin Urine Negative Negative mg/dL    Urobilinogen Urine <2.0 <2.0 mg/dL    Nitrite Urine Negative Negative    Leukocyte Esterase Urine Negative Negative    Mucus Urine Present (A) None Seen /LPF    RBC Urine 5 (H) <=2 /HPF    WBC Urine <1 <=5 /HPF    Squamous Epithelials Urine 2 (H) <=1 /HPF       Imaging Results    Chest XR,  PA & LAT    Result Date: 3/6/2024  EXAM: XR CHEST 2 VIEWS LOCATION: [facility] DATE: 3/6/2024 INDICATION: leukocytosis, lightheadedness COMPARISON: None.     IMPRESSION: Heart size and pulmonary vascularity normal. The lungs are clear.     A total of 40 minutes was spent today on this visit including face to face conversation with the patient, EMR review (labs, imaging studies, pathology reports and outside records), counseling and care co-ordination and documentation.    The longitudinal plan of care for the diagnosis(es)/condition(s) as documented were addressed during this visit. Due to the added complexity in care, I will continue to support Ani in the subsequent management and with ongoing continuity of care.        Signed by: Jory Witt MD

## 2024-03-08 NOTE — LETTER
"    3/8/2024         RE: Ani Aquino  [address] [facility] [address]        Dear Colleague,    Thank you for referring your patient, Ani Aquino, to the [facility]. Please see a copy of my visit note below.    Oncology Rooming Note    March 8, 2024 2:23 PM   Ani Aquino is a 38 year old female who presents for:    Chief Complaint   Patient presents with     Hematology     Leukocytosis      Initial Vitals: /88   Pulse 62   Temp 98.2  F (36.8  C)   Resp 16   Ht 1.803 m (5' 11\")   Wt 116.1 kg (256 lb)   LMP 02/18/2024 (Approximate)   SpO2 99%   BMI 35.70 kg/m   Estimated body mass index is 35.7 kg/m  as calculated from the following:    Height as of this encounter: 1.803 m (5' 11\").    Weight as of this encounter: 116.1 kg (256 lb). Body surface area is 2.41 meters squared.  No Pain (0) Comment: Data Unavailable   Patient's last menstrual period was 02/18/2024 (approximate).  Allergies reviewed: Yes  Medications reviewed: Yes    Medications: Medication refills not needed today.  Pharmacy name entered into [facility]:    [id number] [address] AT [facility] [id number] - [address] AT [facility] [address]    Frailty Screening:   Is the patient here for a new oncology consult visit in cancer care? 2. No      Clinical concerns: None      Desire Witt LPN               [facility] [place] [facility] Hematology and Oncology Consult Note    Patient: Ani Aquino  MRN: [id number]  Date of Service: 03/08/2024      Reason for Visit    Chief Complaint   Patient presents with     Hematology     Leukocytosis          Assessment/Plan    Problem List Items Addressed This Visit    None  Visit " Diagnoses       Leukocytosis, unspecified type        Lymphocytosis                Leukocytosis with absolute lymphocytosis  Erythematous skin rash  Positive BELINDA  Cervical and supraclavicular lymphadenopathy  I have reviewed her chart in detail.  She presented to the ER a couple days ago with chest tightness and left arm pain along with feeling weak and lightheaded.  Symptoms resolved pretty quickly.  ACS ruled out.  However CBC showed significantly elevated total white count at 24,000 which was predominantly lymphocytes.  ALC was 15.5.  ANC was 9.2.  No evidence of any left shift or abnormal cells.  No basophilia.  Serum chemistry was completely normal.  On clinical exam today she does have a supraclavicular and cervical lymphadenopathy on both sides.  According to her these have been there for a month or so.  They are been slowly growing.  No B symptoms.  I reviewed the different etiologies of lymphocytosis.  Infectious versus noninfectious causes.  She was tested for mononucleosis and was negative.  Hep C and HIV testing in the past have been negative.  She has no history of travel outside the country.      In this situation the etiology is broad.  However I think we need to rule out a possible clonal lymphocytosis.  The presence of lymphadenopathy is a little bit worrying but clinically she is not behaving like having an malignant process, but I cannot completely rule this out.  She also has positive BELINDA in the past and has a erythematous rash on her chest.  So potentially this could be reactive lymphadenopathy to an underlying autoimmune problem.  This is also on the basis that reactive lymphocytes were seen on the differential.  So my plan is to recheck her CBC today along with the peripheral smear and flow cytometry.  Will also check LDH.  If further laboratory comes back showing clonal B cells then I think we should expand our workup including getting a CT chest abdomen pelvis and potentially a bone marrow  biopsy.  If this is reactive lymphocytosis only then typically it should resolve once the underlying issue gets better.  So in that situation we may have to look into potential infectious causes like fungal infection or other chronic infections.    She is agreeable to the plan.  Will get labs today.  I will see her back in 2 weeks as a virtual visit to discuss the results and make further follow-up plans.      ECOG Performance    0 - Independent    Problem List    Patient Active Problem List   Diagnosis     History of      Irregular heart beat     Hip pain, right     SI (sacroiliac) joint dysfunction     Palpitations     ______________________________________________________________________________    Staging History    Cancer Staging   No matching staging information was found for the patient.        History of presenting illness:  Ani Aquino and 38-year-old female who has been referred by [facility] medicine for further evaluation management of obstruction for cytosis.    She was seen in the ER couple days ago after she presented with chest and left arm tightness along with feeling weak and lightheaded.  Symptoms started a few hours before the presentation.  And the quickly subsided.  No antecedent fever or other signs of infection.  No recent sore throat.  She had noticed some enlarged lymph nodes in her neck which have been slowing globally over the last couple of months.  Denies any drenching night sweats.  No significant weight loss.  No abdominal pain or bloating.  In the ER she was evaluated for possible cardiac nature of the chest tightness and left arm pain and ACS was ruled out.  Troponin's were normal.  Serum chemistry was normal.  However CBC showed significantly elevated total white count at 24,000 which was predominantly lymphocytes with an absolute lymphocyte count of 15.5 K.  ANC was also up at 9.2.  Blood cultures were negative.  Urine was negative for any obvious infection.  She was  discharged home and was referred here for further evaluation.  On the differential count it was noted that she had reactive lymphocytes.  But she denies having any recent infections.  Monotest was negative.  Previously hep C and HIV testing have been negative.  Non-smoker.  No significant alcohol use.  No travel outside the country.  Denies any cough or shortness of breath or other respiratory symptoms.  No new medications started recently.  She does have a rash on her upper chest which has been there for a long time and she reports that this happens whenever she gets stressed or exposed to sunlight or heat.  She had a positive BELINDA in the past at 1: 160.  Speckled pattern.  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  She takes antihistamines as needed.      Past History    Past Medical History:   Diagnosis Date     Abnormal Pap smear      Irregular heart beat 2016    has had stress test, echo and holtor monitoring.      Family History   Problem Relation Age of Onset     Diabetes Maternal Aunt      Diabetes Maternal Uncle      Coronary Artery Disease Paternal Grandmother      Other Cancer Paternal Grandmother      Coronary Artery Disease Paternal Grandfather      Hypertension Father      Breast Cancer Mother 38.00     Depression Maternal Grandfather      Other Cancer Maternal Grandfather      Asthma Maternal Grandfather      Osteoporosis Maternal Grandfather      Hyperlipidemia Father      Diabetes Maternal Grandmother      Heart Disease Paternal Grandmother      Hypertension Paternal Grandmother      Heart Disease Paternal Grandfather      Hypertension Paternal Grandfather      Cerebrovascular Disease No family hx of      Clotting Disorder No family hx of       Past Surgical History:   Procedure Laterality Date      SECTION      low transverse      SECTION      Social  History     Socioeconomic History     Marital status:      Spouse name: Moody     Number of children: 2     Years of education: Not on file     Highest education level: Not on file   Occupational History     Occupation: teacher     Employer: [employer]   Tobacco Use     Smoking status: Never     Smokeless tobacco: Never   Substance and Sexual Activity     Alcohol use: No     Alcohol/week: 0.0 standard drinks of alcohol     Drug use: No     Sexual activity: Yes     Partners: Male     Birth control/protection: None   Other Topics Concern      Service No     Blood Transfusions No     Caffeine Concern Yes     Comment: 1 soda     Occupational Exposure No     Hobby Hazards No     Sleep Concern No     Stress Concern No     Weight Concern No     Special Diet No     Back Care No     Exercise Yes     Comment: every day 30 mintues     Bike Helmet Yes     Seat Belt Yes     Self-Exams No   Social History Narrative    How much exercise per week? Walking 1-2 times weekly     How much calcium per day? 2 servings daily       How much caffeine per day? 1 can daily     How much vitamin D per day? No     Do you/your family wear seatbelts? Yes     Do you/your family use safety helmets? NA     Do you/your family use sunscreen? No     Do you/your family keep firearms in the home? No    Do you/your family have a smoke detector(s)? Yes        JENNIFER Willis CNM    5/23/19         Social Determinants of Health     Financial Resource Strain: Low Risk  (1/14/2024)    Financial Resource Strain      Within the past 12 months, have you or your family members you live with been unable to get utilities (heat, electricity) when it was really needed?: No   Food Insecurity: Low Risk  (1/14/2024)    Food Insecurity      Within the past 12 months, did you worry that your food would run out before you got money to buy more?: No      Within the past 12 months, did the food you bought just not last and you didn t have money to get  more?: No   Transportation Needs: Low Risk  (1/14/2024)    Transportation Needs      Within the past 12 months, has lack of transportation kept you from medical appointments, getting your medicines, non-medical meetings or appointments, work, or from getting things that you need?: No   Physical Activity: Not on file   Stress: Not on file   Social Connections: Not on file   Interpersonal Safety: Low Risk  (1/18/2024)    Interpersonal Safety      Do you feel physically and emotionally safe where you currently live?: Yes      Within the past 12 months, have you been hit, slapped, kicked or otherwise physically hurt by someone?: No      Within the past 12 months, have you been humiliated or emotionally abused in other ways by your partner or ex-partner?: No   Housing Stability: Low Risk  (1/14/2024)    Housing Stability      Do you have housing? : Yes      Are you worried about losing your housing?: No        Allergies    No Known Allergies    Review of Systems    Pertinent items are noted in HPI.      Physical Exam        3/6/2024     8:30 PM   Oncology Vitals   /66   Pulse 92   SpO2 98 %       General: alert and cooperative  HEENT: Head: Normal, normocephalic, atraumatic.  Eye: Normal external eye, conjunctiva, lids cornea, TAMMY.  Chest: Clear to auscultation bilaterally  Cardiac: S1, S2 normal, regular rate and rhythm  Abdomen: abdomen is soft without significant tenderness, splenomegaly could not be appreciated  Extremities: atraumatic, no peripheral edema  Skin: Erythematous rash noted on the chest.  CNS: Alert and oriented x3, neurologic exam grossly normal.  Lymphatics: Cervical and supraclavicular adenopathy noted right more than left.  She has firm right supraclavicular and cervical lymph nodes.  No clinically significant axillary adenopathy on today's exam.        Lab Results    Recent Results (from the past 168 hour(s))   EKG 12-lead, tracing only   Result Value Ref Range    Systolic Blood Pressure   mmHg    Diastolic Blood Pressure  mmHg    Ventricular Rate 81 BPM    Atrial Rate 81 BPM    AK Interval 136 ms    QRS Duration 82 ms     ms    QTc 455 ms    P Axis 32 degrees    R AXIS 48 degrees    T Axis 58 degrees    Interpretation ECG       Sinus rhythm with occasional Premature ventricular complexes  Nonspecific ST abnormality  Abnormal ECG  No previous ECGs available  Confirmed by FALGUNI NORMAN MD LOC:JN ([id number]) on 3/7/2024 8:49:45 AM     Basic metabolic panel   Result Value Ref Range    Sodium 139 135 - 145 mmol/L    Potassium 3.9 3.4 - 5.3 mmol/L    Chloride 104 98 - 107 mmol/L    Carbon Dioxide (CO2) 24 22 - 29 mmol/L    Anion Gap 11 7 - 15 mmol/L    Urea Nitrogen 9.5 6.0 - 20.0 mg/dL    Creatinine 0.81 0.51 - 0.95 mg/dL    GFR Estimate >90 >60 mL/min/1.73m2    Calcium 8.8 8.6 - 10.0 mg/dL    Glucose 103 (H) 70 - 99 mg/dL   Troponin T, High Sensitivity   Result Value Ref Range    Troponin T, High Sensitivity <6 <=14 ng/L   CBC with platelets and differential   Result Value Ref Range    WBC Count 25.9 (H) 4.0 - 11.0 10e3/uL    RBC Count 4.93 3.80 - 5.20 10e6/uL    Hemoglobin 14.6 11.7 - 15.7 g/dL    Hematocrit 44.0 35.0 - 47.0 %    MCV 89 78 - 100 fL    MCH 29.6 26.5 - 33.0 pg    MCHC 33.2 31.5 - 36.5 g/dL    RDW 12.6 10.0 - 15.0 %    Platelet Count 323 150 - 450 10e3/uL    % Neutrophils      % Lymphocytes      % Monocytes      % Eosinophils      % Basophils      % Immature Granulocytes      NRBCs per 100 WBC 0 <1 /100    Absolute Neutrophils      Absolute Lymphocytes      Absolute Monocytes      Absolute Eosinophils      Absolute Basophils      Absolute Immature Granulocytes      Absolute NRBCs 0.0 10e3/uL   Extra Blue Top Tube   Result Value Ref Range    Hold Specimen JIC    Extra Red Top Tube   Result Value Ref Range    Hold Specimen JIC    N terminal pro BNP outpatient   Result Value Ref Range    N Terminal Pro BNP Outpatient 103 0 - 450 pg/mL   Magnesium   Result Value Ref Range    Magnesium 2.0  1.7 - 2.3 mg/dL   Hepatic function panel   Result Value Ref Range    Protein Total 8.3 6.4 - 8.3 g/dL    Albumin 4.6 3.5 - 5.2 g/dL    Bilirubin Total 0.5 <=1.2 mg/dL    Alkaline Phosphatase 107 40 - 150 U/L    AST 23 0 - 45 U/L    ALT 21 0 - 50 U/L    Bilirubin Direct <0.20 0.00 - 0.30 mg/dL   Mononucleosis screen   Result Value Ref Range    Mononucleosis Screen Negative Negative   D dimer quantitative   Result Value Ref Range    D-Dimer Quantitative <0.27 0.00 - 0.50 ug/mL FEU   Manual Differential   Result Value Ref Range    % Neutrophils 36 %    % Lymphocytes 60 %    % Monocytes 4 %    % Eosinophils 0 %    % Basophils 0 %    Absolute Neutrophils 9.3 (H) 1.6 - 8.3 10e3/uL    Absolute Lymphocytes 15.5 (H) 0.8 - 5.3 10e3/uL    Absolute Monocytes 1.0 0.0 - 1.3 10e3/uL    Absolute Eosinophils 0.0 0.0 - 0.7 10e3/uL    Absolute Basophils 0.0 0.0 - 0.2 10e3/uL    RBC Morphology Confirmed RBC Indices     Platelet Assessment  Automated Count Confirmed. Platelet morphology is normal.     Automated Count Confirmed. Platelet morphology is normal.    Reactive Lymphocytes Present (A) None Seen    Pathologist Review Comments (Blood)       Slide reviewed by Dr. Annie Vora M.D.   absolute lymphocytosis   Lactic acid whole blood   Result Value Ref Range    Lactic Acid 0.8 0.7 - 2.0 mmol/L   Blood Culture Peripheral Blood    Specimen: Peripheral Blood   Result Value Ref Range    Culture No growth after 1 day    Blood Culture Line, venous    Specimen: Line, venous; Blood   Result Value Ref Range    Culture No growth after 1 day    UA with Microscopic reflex to Culture    Specimen: Urine, Clean Catch   Result Value Ref Range    Color Urine Light Yellow Colorless, Straw, Light Yellow, Yellow    Appearance Urine Clear Clear    Glucose Urine Negative Negative mg/dL    Bilirubin Urine Negative Negative    Ketones Urine Negative Negative mg/dL    Specific Gravity Urine 1.019 1.001 - 1.030    Blood Urine 0.1 mg/dL (A) Negative    pH  Urine 7.0 5.0 - 7.0    Protein Albumin Urine Negative Negative mg/dL    Urobilinogen Urine <2.0 <2.0 mg/dL    Nitrite Urine Negative Negative    Leukocyte Esterase Urine Negative Negative    Mucus Urine Present (A) None Seen /LPF    RBC Urine 5 (H) <=2 /HPF    WBC Urine <1 <=5 /HPF    Squamous Epithelials Urine 2 (H) <=1 /HPF       Imaging Results    Chest XR,  PA & LAT    Result Date: 3/6/2024  EXAM: XR CHEST 2 VIEWS LOCATION: [facility] DATE: 3/6/2024 INDICATION: leukocytosis, lightheadedness COMPARISON: None.     IMPRESSION: Heart size and pulmonary vascularity normal. The lungs are clear.     A total of 40 minutes was spent today on this visit including face to face conversation with the patient, EMR review (labs, imaging studies, pathology reports and outside records), counseling and care co-ordination and documentation.    The longitudinal plan of care for the diagnosis(es)/condition(s) as documented were addressed during this visit. Due to the added complexity in care, I will continue to support Ani in the subsequent management and with ongoing continuity of care.        Signed by: Jory Witt MD      Again, thank you for allowing me to participate in the care of your patient.        Sincerely,        Jory Witt MD

## 2024-03-11 LAB
BACTERIA BLD CULT: NO GROWTH
BACTERIA BLD CULT: NO GROWTH
PATH REPORT.COMMENTS IMP SPEC: ABNORMAL
PATH REPORT.COMMENTS IMP SPEC: YES
PATH REPORT.FINAL DX SPEC: ABNORMAL
PATH REPORT.MICROSCOPIC SPEC OTHER STN: ABNORMAL
PATH REPORT.RELEVANT HX SPEC: ABNORMAL

## 2024-03-14 LAB
PATH REPORT.ADDENDUM SPEC: NORMAL
PATH REPORT.COMMENTS IMP SPEC: NORMAL
PATH REPORT.COMMENTS IMP SPEC: NORMAL
PATH REPORT.FINAL DX SPEC: NORMAL
PATH REPORT.RELEVANT HX SPEC: NORMAL

## 2024-03-21 ENCOUNTER — VIRTUAL VISIT (OUTPATIENT)
Dept: ONCOLOGY | Facility: CLINIC | Age: 39
End: 2024-03-21
Attending: INTERNAL MEDICINE
Payer: COMMERCIAL

## 2024-03-21 DIAGNOSIS — D89.89: ICD-10-CM

## 2024-03-21 DIAGNOSIS — R59.1 LYMPHADENOPATHY: ICD-10-CM

## 2024-03-21 DIAGNOSIS — C91.10 CHRONIC LYMPHOCYTIC LEUKEMIA (H): Primary | ICD-10-CM

## 2024-03-21 PROCEDURE — 99215 OFFICE O/P EST HI 40 MIN: CPT | Mod: 95 | Performed by: INTERNAL MEDICINE

## 2024-03-21 PROCEDURE — G2211 COMPLEX E/M VISIT ADD ON: HCPCS | Mod: 95 | Performed by: INTERNAL MEDICINE

## 2024-03-21 ASSESSMENT — PAIN SCALES - GENERAL: PAINLEVEL: NO PAIN (0)

## 2024-03-21 NOTE — LETTER
3/21/2024         RE: Ani Aquino  [address] [facility] [address]        Dear Colleague,    Thank you for referring your patient, Ani Aquino, to the [facility]. Please see a copy of my visit note below.      Video-Visit Details    Type of service:  Video Visit    Video Start Time:  9:57 AM  Video End Time:  10:17 AM    Originating Location (pt. Location): Home in [place]    Distant Location (provider location):  Home/[place]    Platform used for Video Visit: [facility] Hematology and Oncology Progress Note    Patient: Ani Aquino  MRN: [id number]  3/21/24        Reason for Visit    Chief Complaint   Patient presents with     Follow Up     2 week follow up, no updates per pt. Medications/allergies reviewed by pt via [employer], no changes per pt. No pt reported vitals today per pt.         Problem List Items Addressed This Visit    None  Visit Diagnoses       Chronic lymphocytic leukemia (H)    -  Primary    Relevant Orders    IGH@ VARIABLE REGION SOMATIC MUTATION ANALYSIS    Hepatitis B Surface Antibody    Hepatitis B surface antigen    Hepatitis B core antibody    Hepatitis C antibody    Lactate Dehydrogenase    CBC with platelets and differential    Comprehensive metabolic panel (BMP + Alb, Alk Phos, ALT, AST, Total. Bili, TP)    CT Chest/Abdomen/Pelvis w Contrast    CT Soft Tissue Neck w Contrast    Lymphadenopathy        Antibody to extractable nuclear antigen (SAM) positive (H24)                  Assessment and Plan  Chronic lymphocytic leukemia  Youngblood stage I  Cytogenetics: 13 q. deletion present, no evidence of 17 P deletion  Incidentally found elevated white count.  I had obtained more specific lab studies last time.  I reviewed those lab results in detail with her today.  CBC at that time came back showing total white count of 21,000.  Predominantly lymphocytes with an absolute lymphocyte count of 12.9.  She also had mildly elevated monocyte count  at 2.3.  No other abnormal blood cells in the peripheral blood.  LDH was normal at 175.  Peripheral smear showed absolute lymphocytosis which appears mature.  We ordered.  Material in the peripheral blood which came back showing monotypic kappa restricted B cells which was CD5 positive, CD19 positive, CD20 dim, CD23 positive and lacked CD10, CD38, CD49d and CD79b.  No aberrant expression of any T-cell markers.  FISH on peripheral blood came back showing deletion 13 q.  No evidence of 11: 14 translocation or 17 P deletion.    The constellation of these findings is most consistent with CLL.  I reviewed the diagnosis of CLL with her in detail today.  I reviewed its natural course, prognosis and treatment options.  She does have lymphadenopathy on clinical exam.  She had bilateral supraclavicular lymph nodes.  They were not bulky.  Clinically she is not symptomatic.  No fevers chills or excessive fatigue.  No significant weight loss.  She does have some night sweats which has happened over the last few months.  Maybe once or twice a week.  Not necessarily drenching but has noticed some sweat dripping when she wakes up.  But this is not persistent.  No evidence of any anemia or thrombocytopenia.  Overall in my opinion currently she does not have any absolute indications to start treatment.  So my plan is to obtain a CT scan of the chest abdomen pelvis to look for bulky adenopathy and splenomegaly.  In the absence of that, we will probably continue monitoring her closely.  Due to her young age at diagnosis, at some point she will need treatment.  I will also send peripheral blood for IGHV somatic mutation status.  I explained to her that she does not have any high risk genetic features so we are probably looking at a more indolent course however we need to be vigilant about potential transformation into high-grade lymphoma and also watch for development of B symptoms or other clinical signs or symptoms that would warrant  treatment.    Encouraged physical activity.  Could try daily vitamin D supplementation.  Encouraged healthy food habits.    She does have a history of positive BELINDA.  No overt signs or symptoms of lupus.  But did have some facial flushing and redness of her upper chest.  No joint symptoms.  I explained to her that lymphoma is an autoimmune disorders can coexist so watch for any progressive increase in those symptoms and if that happens we may have to evaluate for possible underlying autoimmune disorder.    Will schedule CT of the soft tissue and neck and CT chest abdomen pelvis in the immediate future.  I will see her back in 3 months with repeat labs 2 weeks prior to the visit.  She is agreeable to the plan.     Cancer Staging   No matching staging information was found for the patient.      ECOG Performance    0 - Independent         Problem List    Patient Active Problem List   Diagnosis     History of      Irregular heart beat     Hip pain, right     SI (sacroiliac) joint dysfunction     Palpitations            Interval History   Ani Aquino is a 38 year old female with absolute leukocytosis and lymphadenopathy who is seen in the video visit to discuss the results.    I saw her a couple weeks ago for elevated white count along with lymphadenopathy.  This was incidentally found when she presented with some chest symptoms.  I obtained some lab studies and she is here to review the results.  No B symptoms reported.        Review of Systems  A comprehensive review of systems was negative except for what is noted in the interval history    Current Outpatient Medications   Medication     DEBLITANE 0.35 MG per tablet     No current facility-administered medications for this visit.        Physical Exam    Failed to redirect to the Timeline version of the [place] [employer].    General: alert and cooperative      Lab Results    No results found for this or any previous visit (from the past 168  hour(s)).    Imaging    Chest XR,  PA & LAT    Result Date: 3/6/2024  EXAM: XR CHEST 2 VIEWS LOCATION: [facility] DATE: 3/6/2024 INDICATION: leukocytosis, lightheadedness COMPARISON: None.     IMPRESSION: Heart size and pulmonary vascularity normal. The lungs are clear.     A total of 40 min were spent today on this visit which included face to face conversation with the patient, EMR review, counseling and co-ordination of care and medical documentation.    The longitudinal plan of care for the diagnosis(es)/condition(s) as documented were addressed during this visit. Due to the added complexity in care, I will continue to support Ani in the subsequent management and with ongoing continuity of care.        Signed by: Jory Witt MD      Again, thank you for allowing me to participate in the care of your patient.        Sincerely,        Jory Witt MD

## 2024-03-21 NOTE — PROGRESS NOTES
"Virtual Visit Details    Type of service:  Video Visit     Originating Location (pt. Location): {video visit patient location:546577::\"Home\"}  {PROVIDER LOCATION On-site should be selected for visits conducted from your clinic location or adjoining [facility] hospital, academic office, or other nearby [facility] building. Off-site should be selected for all other provider locations, including home:[id number]}  Distant Location (provider location):  {virtual location provider:293968}  Platform used for Video Visit: {Virtual Visit Platforms:760371::\"[employer]\"}    "

## 2024-03-21 NOTE — LETTER
3/21/2024         RE: Ani Aquino  [address] [address]        Dear Colleague,    Thank you for referring your patient, Ani Aquino, to the [facility]. Please see a copy of my visit note below.      Video-Visit Details    Type of service:  Video Visit    Video Start Time:  9:57 AM  Video End Time:  10:17 AM    Originating Location (pt. Location): Home in [place]    Distant Location (provider location):  Home/[place]    Platform used for Video Visit: [facility] Hematology and Oncology Progress Note    Patient: Ani Aquino  MRN: [id number]  3/21/24        Reason for Visit    Chief Complaint   Patient presents with     Follow Up     2 week follow up, no updates per pt. Medications/allergies reviewed by pt via Mobi Rider, no changes per pt. No pt reported vitals today per pt.         Problem List Items Addressed This Visit    None  Visit Diagnoses       Chronic lymphocytic leukemia (H)    -  Primary    Relevant Orders    IGH@ VARIABLE REGION SOMATIC MUTATION ANALYSIS    Hepatitis B Surface Antibody    Hepatitis B surface antigen    Hepatitis B core antibody    Hepatitis C antibody    Lactate Dehydrogenase    CBC with platelets and differential    Comprehensive metabolic panel (BMP + Alb, Alk Phos, ALT, AST, Total. Bili, TP)    CT Chest/Abdomen/Pelvis w Contrast    CT Soft Tissue Neck w Contrast    Lymphadenopathy        Antibody to extractable nuclear antigen (SAM) positive (H24)                  Assessment and Plan  Chronic lymphocytic leukemia  Youngblood stage I  Cytogenetics: 13 q. deletion present, no evidence of 17 P deletion  Incidentally found elevated white count.  I had obtained more specific lab studies last time.  I reviewed those lab results in detail with her today.  CBC at that time came back showing total white count of 21,000.  Predominantly lymphocytes with an absolute lymphocyte count of 12.9.  She also had mildly elevated monocyte count  at 2.3.  No other abnormal blood cells in the peripheral blood.  LDH was normal at 175.  Peripheral smear showed absolute lymphocytosis which appears mature.  We ordered.  Material in the peripheral blood which came back showing monotypic kappa restricted B cells which was CD5 positive, CD19 positive, CD20 dim, CD23 positive and lacked CD10, CD38, CD49d and CD79b.  No aberrant expression of any T-cell markers.  FISH on peripheral blood came back showing deletion 13 q.  No evidence of 11: 14 translocation or 17 P deletion.    The constellation of these findings is most consistent with CLL.  I reviewed the diagnosis of CLL with her in detail today.  I reviewed its natural course, prognosis and treatment options.  She does have lymphadenopathy on clinical exam.  She had bilateral supraclavicular lymph nodes.  They were not bulky.  Clinically she is not symptomatic.  No fevers chills or excessive fatigue.  No significant weight loss.  She does have some night sweats which has happened over the last few months.  Maybe once or twice a week.  Not necessarily drenching but has noticed some sweat dripping when she wakes up.  But this is not persistent.  No evidence of any anemia or thrombocytopenia.  Overall in my opinion currently she does not have any absolute indications to start treatment.  So my plan is to obtain a CT scan of the chest abdomen pelvis to look for bulky adenopathy and splenomegaly.  In the absence of that, we will probably continue monitoring her closely.  Due to her young age at diagnosis, at some point she will need treatment.  I will also send peripheral blood for IGHV somatic mutation status.  I explained to her that she does not have any high risk genetic features so we are probably looking at a more indolent course however we need to be vigilant about potential transformation into high-grade lymphoma and also watch for development of B symptoms or other clinical signs or symptoms that would warrant  treatment.    Encouraged physical activity.  Could try daily vitamin D supplementation.  Encouraged healthy food habits.    She does have a history of positive BELINDA.  No overt signs or symptoms of lupus.  But did have some facial flushing and redness of her upper chest.  No joint symptoms.  I explained to her that lymphoma is an autoimmune disorders can coexist so watch for any progressive increase in those symptoms and if that happens we may have to evaluate for possible underlying autoimmune disorder.    Will schedule CT of the soft tissue and neck and CT chest abdomen pelvis in the immediate future.  I will see her back in 3 months with repeat labs 2 weeks prior to the visit.  She is agreeable to the plan.     Cancer Staging   No matching staging information was found for the patient.      ECOG Performance    0 - Independent         Problem List    Patient Active Problem List   Diagnosis     History of      Irregular heart beat     Hip pain, right     SI (sacroiliac) joint dysfunction     Palpitations            Interval History   Ani Aquino is a 38 year old female with absolute leukocytosis and lymphadenopathy who is seen in the video visit to discuss the results.    I saw her a couple weeks ago for elevated white count along with lymphadenopathy.  This was incidentally found when she presented with some chest symptoms.  I obtained some lab studies and she is here to review the results.  No B symptoms reported.        Review of Systems  A comprehensive review of systems was negative except for what is noted in the interval history    Current Outpatient Medications   Medication     DEBLITANE 0.35 MG per tablet     No current facility-administered medications for this visit.        Physical Exam    Failed to redirect to the Timeline version of the [employer] SmartLink.    General: alert and cooperative      Lab Results    No results found for this or any previous visit (from the past 168  hour(s)).    Imaging    Chest XR,  PA & LAT    Result Date: 3/6/2024  EXAM: XR CHEST 2 VIEWS LOCATION: [facility] DATE: 3/6/2024 INDICATION: leukocytosis, lightheadedness COMPARISON: None.     IMPRESSION: Heart size and pulmonary vascularity normal. The lungs are clear.     A total of 40 min were spent today on this visit which included face to face conversation with the patient, EMR review, counseling and co-ordination of care and medical documentation.    The longitudinal plan of care for the diagnosis(es)/condition(s) as documented were addressed during this visit. Due to the added complexity in care, I will continue to support Ani in the subsequent management and with ongoing continuity of care.        Signed by: Jory Witt MD      Again, thank you for allowing me to participate in the care of your patient.        Sincerely,        Jory Witt MD

## 2024-03-21 NOTE — PROGRESS NOTES
Video-Visit Details    Type of service:  Video Visit    Video Start Time:  9:57 AM  Video End Time:  10:17 AM    Originating Location (pt. Location): Home in [place]    Distant Location (provider location):  Home/[place]    Platform used for Video Visit: [facility] Hematology and Oncology Progress Note    Patient: Ani Aquino  MRN: [id number]  3/21/24        Reason for Visit    Chief Complaint   Patient presents with    Follow Up     2 week follow up, no updates per pt. Medications/allergies reviewed by pt via TabUp, no changes per pt. No pt reported vitals today per pt.         Problem List Items Addressed This Visit    None  Visit Diagnoses       Chronic lymphocytic leukemia (H)    -  Primary    Relevant Orders    IGH@ VARIABLE REGION SOMATIC MUTATION ANALYSIS    Hepatitis B Surface Antibody    Hepatitis B surface antigen    Hepatitis B core antibody    Hepatitis C antibody    Lactate Dehydrogenase    CBC with platelets and differential    Comprehensive metabolic panel (BMP + Alb, Alk Phos, ALT, AST, Total. Bili, TP)    CT Chest/Abdomen/Pelvis w Contrast    CT Soft Tissue Neck w Contrast    Lymphadenopathy        Antibody to extractable nuclear antigen (SAM) positive (H24)                  Assessment and Plan  Chronic lymphocytic leukemia  Youngblood stage I  Cytogenetics: 13 q. deletion present, no evidence of 17 P deletion  Incidentally found elevated white count.  I had obtained more specific lab studies last time.  I reviewed those lab results in detail with her today.  CBC at that time came back showing total white count of 21,000.  Predominantly lymphocytes with an absolute lymphocyte count of 12.9.  She also had mildly elevated monocyte count at 2.3.  No other abnormal blood cells in the peripheral blood.  LDH was normal at 175.  Peripheral smear showed absolute lymphocytosis which appears mature.  We ordered.  Material in the peripheral blood which came back showing monotypic kappa  restricted B cells which was CD5 positive, CD19 positive, CD20 dim, CD23 positive and lacked CD10, CD38, CD49d and CD79b.  No aberrant expression of any T-cell markers.  FISH on peripheral blood came back showing deletion 13 q.  No evidence of 11: 14 translocation or 17 P deletion.    The constellation of these findings is most consistent with CLL.  I reviewed the diagnosis of CLL with her in detail today.  I reviewed its natural course, prognosis and treatment options.  She does have lymphadenopathy on clinical exam.  She had bilateral supraclavicular lymph nodes.  They were not bulky.  Clinically she is not symptomatic.  No fevers chills or excessive fatigue.  No significant weight loss.  She does have some night sweats which has happened over the last few months.  Maybe once or twice a week.  Not necessarily drenching but has noticed some sweat dripping when she wakes up.  But this is not persistent.  No evidence of any anemia or thrombocytopenia.  Overall in my opinion currently she does not have any absolute indications to start treatment.  So my plan is to obtain a CT scan of the chest abdomen pelvis to look for bulky adenopathy and splenomegaly.  In the absence of that, we will probably continue monitoring her closely.  Due to her young age at diagnosis, at some point she will need treatment.  I will also send peripheral blood for IGHV somatic mutation status.  I explained to her that she does not have any high risk genetic features so we are probably looking at a more indolent course however we need to be vigilant about potential transformation into high-grade lymphoma and also watch for development of B symptoms or other clinical signs or symptoms that would warrant treatment.    Encouraged physical activity.  Could try daily vitamin D supplementation.  Encouraged healthy food habits.    She does have a history of positive BELINDA.  No overt signs or symptoms of lupus.  But did have some facial flushing and  redness of her upper chest.  No joint symptoms.  I explained to her that lymphoma is an autoimmune disorders can coexist so watch for any progressive increase in those symptoms and if that happens we may have to evaluate for possible underlying autoimmune disorder.    Will schedule CT of the soft tissue and neck and CT chest abdomen pelvis in the immediate future.  I will see her back in 3 months with repeat labs 2 weeks prior to the visit.  She is agreeable to the plan.     Cancer Staging   No matching staging information was found for the patient.      ECOG Performance    0 - Independent         Problem List    Patient Active Problem List   Diagnosis    History of     Irregular heart beat    Hip pain, right    SI (sacroiliac) joint dysfunction    Palpitations            Interval History   Ani Aquino is a 38 year old female with absolute leukocytosis and lymphadenopathy who is seen in the video visit to discuss the results.    I saw her a couple weeks ago for elevated white count along with lymphadenopathy.  This was incidentally found when she presented with some chest symptoms.  I obtained some lab studies and she is here to review the results.  No B symptoms reported.        Review of Systems  A comprehensive review of systems was negative except for what is noted in the interval history    Current Outpatient Medications   Medication    DEBLITANE 0.35 MG per tablet     No current facility-administered medications for this visit.        Physical Exam    Failed to redirect to the Timeline version of the [employer] SmartLink.    General: alert and cooperative      Lab Results    No results found for this or any previous visit (from the past 168 hour(s)).    Imaging    Chest XR,  PA & LAT    Result Date: 3/6/2024  EXAM: XR CHEST 2 VIEWS LOCATION: [facility] DATE: 3/6/2024 INDICATION: leukocytosis, lightheadedness COMPARISON: None.     IMPRESSION: Heart size and pulmonary vascularity  normal. The lungs are clear.     A total of 40 min were spent today on this visit which included face to face conversation with the patient, EMR review, counseling and co-ordination of care and medical documentation.    The longitudinal plan of care for the diagnosis(es)/condition(s) as documented were addressed during this visit. Due to the added complexity in care, I will continue to support Ani in the subsequent management and with ongoing continuity of care.        Signed by: Jory Witt MD

## 2024-03-21 NOTE — NURSING NOTE
Patient denies any changes since echeck-in regarding medication and allergies and states all information entered during echeck-in remains accurate.    Is the patient currently in the state of [place]? YES    Visit mode:VIDEO    If the visit is dropped, the patient can be reconnected by: VIDEO VISIT: Text to cell phone:   Telephone Information:   Mobile [phone]       Will anyone else be joining the visit? NO  (If patient encounters technical issues they should call [phone])    How would you like to obtain your AVS? MyChart    Are changes needed to the allergy or medication list? No, Pt stated no changes to allergies, and Pt stated no med changes    Reason for visit: Follow Up (2 week follow up, no updates per pt. Medications/allergies reviewed by pt via ARX, no changes per pt. No pt reported vitals today per pt.)    Shaila Christine, Visit Facilitator/MA.

## 2024-03-22 ENCOUNTER — OFFICE VISIT (OUTPATIENT)
Dept: FAMILY MEDICINE | Facility: CLINIC | Age: 39
End: 2024-03-22
Payer: COMMERCIAL

## 2024-03-22 VITALS
TEMPERATURE: 97.7 F | HEART RATE: 73 BPM | SYSTOLIC BLOOD PRESSURE: 133 MMHG | DIASTOLIC BLOOD PRESSURE: 84 MMHG | RESPIRATION RATE: 16 BRPM | OXYGEN SATURATION: 98 %

## 2024-03-22 DIAGNOSIS — N30.90 BLADDER INFECTION: Primary | ICD-10-CM

## 2024-03-22 DIAGNOSIS — R30.0 DYSURIA: ICD-10-CM

## 2024-03-22 LAB
ALBUMIN UR-MCNC: NEGATIVE MG/DL
APPEARANCE UR: ABNORMAL
BACTERIA #/AREA URNS HPF: ABNORMAL /HPF
BILIRUB UR QL STRIP: NEGATIVE
COLOR UR AUTO: YELLOW
GLUCOSE UR STRIP-MCNC: NEGATIVE MG/DL
HGB UR QL STRIP: ABNORMAL
KETONES UR STRIP-MCNC: NEGATIVE MG/DL
LEUKOCYTE ESTERASE UR QL STRIP: ABNORMAL
MUCOUS THREADS #/AREA URNS LPF: PRESENT /LPF
NITRATE UR QL: POSITIVE
PH UR STRIP: 6.5 [PH] (ref 5–8)
RBC #/AREA URNS AUTO: ABNORMAL /HPF
SP GR UR STRIP: 1.02 (ref 1–1.03)
SQUAMOUS #/AREA URNS AUTO: ABNORMAL /LPF
UROBILINOGEN UR STRIP-ACNC: 4 E.U./DL
WBC #/AREA URNS AUTO: ABNORMAL /HPF

## 2024-03-22 PROCEDURE — 81001 URINALYSIS AUTO W/SCOPE: CPT | Performed by: FAMILY MEDICINE

## 2024-03-22 PROCEDURE — 87186 SC STD MICRODIL/AGAR DIL: CPT | Performed by: FAMILY MEDICINE

## 2024-03-22 PROCEDURE — 87086 URINE CULTURE/COLONY COUNT: CPT | Performed by: FAMILY MEDICINE

## 2024-03-22 PROCEDURE — 99213 OFFICE O/P EST LOW 20 MIN: CPT | Performed by: FAMILY MEDICINE

## 2024-03-22 RX ORDER — NITROFURANTOIN 25; 75 MG/1; MG/1
100 CAPSULE ORAL 2 TIMES DAILY
Qty: 10 CAPSULE | Refills: 0 | Status: SHIPPED | OUTPATIENT
Start: 2024-03-22 | End: 2024-03-27

## 2024-03-22 NOTE — PROGRESS NOTES
Assessment:       Bladder infection  - nitroFURantoin macrocrystal-monohydrate (MACROBID) 100 MG capsule  Dispense: 10 capsule; Refill: 0    Dysuria  - UA Macroscopic with reflex to Microscopic and Culture - Clinic Collect  - Urine Microscopic Exam  - Urine Culture    Plan:     Symptoms consistent with a urinary tract infection.  Antibiotics prescribed as noted above.  Urine culture is pending and will adjust antibiotic based on the culture and sensitivities as needed.  Recommend pushing fluids and follow-up if symptoms are getting worse or not improving over the next 2 to 3 days.      MEDICATIONS:   Orders Placed This Encounter   Medications    nitroFURantoin macrocrystal-monohydrate (MACROBID) 100 MG capsule     Sig: Take 1 capsule (100 mg) by mouth 2 times daily for 5 days     Dispense:  10 capsule     Refill:  0         Subjective:       38 year old female presents for evaluation a couple hour history of burning with urination increased urinary frequency and urgency.  She denies any blood in her urine, back pain, abdominal pain, vaginal discharge, nausea, vomiting, or fevers.    Patient Active Problem List   Diagnosis    History of     Irregular heart beat    Hip pain, right    SI (sacroiliac) joint dysfunction    Palpitations       Past Medical History:   Diagnosis Date    Abnormal Pap smear     Irregular heart beat 2016    has had stress test, echo and holtor monitoring.         Past Surgical History:   Procedure Laterality Date     SECTION      low transverse     SECTION         Current Outpatient Medications   Medication    nitroFURantoin macrocrystal-monohydrate (MACROBID) 100 MG capsule    DEBLITANE 0.35 MG per tablet     No current facility-administered medications for this visit.       No Known Allergies    Family History   Problem Relation Age of Onset    Diabetes Maternal Aunt     Diabetes Maternal Uncle     Coronary Artery Disease Paternal Grandmother     Other Cancer  Paternal Grandmother     Coronary Artery Disease Paternal Grandfather     Hypertension Father     Breast Cancer Mother 38.00    Depression Maternal Grandfather     Other Cancer Maternal Grandfather     Asthma Maternal Grandfather     Osteoporosis Maternal Grandfather     Hyperlipidemia Father     Diabetes Maternal Grandmother     Heart Disease Paternal Grandmother     Hypertension Paternal Grandmother     Heart Disease Paternal Grandfather     Hypertension Paternal Grandfather     Cerebrovascular Disease No family hx of     Clotting Disorder No family hx of        Social History     Socioeconomic History    Marital status:      Spouse name: Moody    Number of children: 2    Years of education: None    Highest education level: None   Occupational History    Occupation: teacher     Employer: [employer]   Tobacco Use    Smoking status: Never     Passive exposure: Never    Smokeless tobacco: Never   Substance and Sexual Activity    Alcohol use: No     Alcohol/week: 0.0 standard drinks of alcohol    Drug use: No    Sexual activity: Yes     Partners: Male     Birth control/protection: None   Other Topics Concern     Service No    Blood Transfusions No    Caffeine Concern Yes     Comment: 1 soda    Occupational Exposure No    Hobby Hazards No    Sleep Concern No    Stress Concern No    Weight Concern No    Special Diet No    Back Care No    Exercise Yes     Comment: every day 30 mintues    Bike Helmet Yes    Seat Belt Yes    Self-Exams No   Social History Narrative    How much exercise per week? Walking 1-2 times weekly     How much calcium per day? 2 servings daily       How much caffeine per day? 1 can daily     How much vitamin D per day? No     Do you/your family wear seatbelts? Yes     Do you/your family use safety helmets? NA     Do you/your family use sunscreen? No     Do you/your family keep firearms in the home? No    Do you/your family have a smoke detector(s)? Yes        JENNIFER Willis  SYLWIA    [birth year]         Social Determinants of Health     Financial Resource Strain: Low Risk  (1/14/2024)    Financial Resource Strain     Within the past 12 months, have you or your family members you live with been unable to get utilities (heat, electricity) when it was really needed?: No   Food Insecurity: Low Risk  (1/14/2024)    Food Insecurity     Within the past 12 months, did you worry that your food would run out before you got money to buy more?: No     Within the past 12 months, did the food you bought just not last and you didn t have money to get more?: No   Transportation Needs: Low Risk  (1/14/2024)    Transportation Needs     Within the past 12 months, has lack of transportation kept you from medical appointments, getting your medicines, non-medical meetings or appointments, work, or from getting things that you need?: No   Interpersonal Safety: Low Risk  (1/18/2024)    Interpersonal Safety     Do you feel physically and emotionally safe where you currently live?: Yes     Within the past 12 months, have you been hit, slapped, kicked or otherwise physically hurt by someone?: No     Within the past 12 months, have you been humiliated or emotionally abused in other ways by your partner or ex-partner?: No   Housing Stability: Low Risk  (1/14/2024)    Housing Stability     Do you have housing? : Yes     Are you worried about losing your housing?: No         Review of Systems  Pertinent items are noted in HPI.      Objective:     /84   Pulse 73   Temp 97.7  F (36.5  C) (Oral)   Resp 16   LMP 02/18/2024 (Approximate)   SpO2 98%      General appearance: alert, appears stated age, and cooperative  Back: No CVA tenderness  Lungs: clear to auscultation bilaterally  Abdomen: soft, non-tender; bowel sounds normal; no masses,  no organomegaly       Results for orders placed or performed in visit on 03/22/24   UA Macroscopic with reflex to Microscopic and Culture - Clinic Collect     Status: Abnormal     Specimen: Urine, Clean Catch   Result Value Ref Range    Color Urine Yellow Colorless, Straw, Light Yellow, Yellow    Appearance Urine Cloudy (A) Clear    Glucose Urine Negative Negative mg/dL    Bilirubin Urine Negative Negative    Ketones Urine Negative Negative mg/dL    Specific Gravity Urine 1.025 1.005 - 1.030    Blood Urine Moderate (A) Negative    pH Urine 6.5 5.0 - 8.0    Protein Albumin Urine Negative Negative mg/dL    Urobilinogen Urine 4.0 (A) 0.2, 1.0 E.U./dL    Nitrite Urine Positive (A) Negative    Leukocyte Esterase Urine Moderate (A) Negative   Urine Microscopic Exam     Status: Abnormal   Result Value Ref Range    Bacteria Urine Many (A) None Seen /HPF    RBC Urine 25-50 (A) 0-2 /HPF /HPF    WBC Urine 25-50 (A) 0-5 /HPF /HPF    Squamous Epithelials Urine Few (A) None Seen /LPF    Mucus Urine Present (A) None Seen /LPF       This note has been dictated using voice recognition software. Any grammatical or context distortions are unintentional and inherent to the software

## 2024-03-22 NOTE — PATIENT INSTRUCTIONS
Urinary Tract Infection (UTI) in Women: Care Instructions  Overview     A urinary tract infection (UTI) is an infection caused by bacteria. It can happen anywhere in the urinary tract. A UTI can happen in the:  Kidneys.  Ureters, the tubes that connect the kidneys to the bladder.  Bladder.  Urethra, where the urine comes out.  Most UTIs are bladder infections. They often cause pain or burning when you urinate.  Most UTIs can be cured with antibiotics. If you are prescribed antibiotics, be sure to complete your treatment so that the infection does not get worse.  Follow-up care is a key part of your treatment and safety. Be sure to make and go to all appointments, and call your doctor if you are having problems. It's also a good idea to know your test results and keep a list of the medicines you take.  How can you care for yourself at home?  Take your antibiotics as directed. Do not stop taking them just because you feel better. You need to take the full course of antibiotics.  Drink extra water and other fluids for the next day or two. This will help make the urine less concentrated and help wash out the bacteria that are causing the infection. (If you have kidney, heart, or liver disease and have to limit fluids, talk with your doctor before you increase the amount of fluids you drink.)  Avoid drinks that are carbonated or have caffeine. They can irritate the bladder.  Urinate often. Try to empty your bladder each time.  To relieve pain, take a hot bath or lay a heating pad set on low over your lower belly or genital area. Never go to sleep with a heating pad in place.  To prevent UTIs  Drink plenty of water each day. This helps you urinate often, which clears bacteria from your system. (If you have kidney, heart, or liver disease and have to limit fluids, talk with your doctor before you increase the amount of fluids you drink.)  Urinate when you need to.  If you are sexually active, urinate right after you have  "sex.  Change sanitary pads often.  Avoid douches, bubble baths, feminine hygiene sprays, and other feminine hygiene products that have deodorants.  After going to the bathroom, wipe from front to back.  When should you call for help?   Call your doctor now or seek immediate medical care if:    You have new or worse fever, chills, nausea, or vomiting.     You have new pain in your back just below your rib cage. This is called flank pain.     There is new blood or pus in your urine.     You have any problems with your antibiotic medicine.   Watch closely for changes in your health, and be sure to contact your doctor if:    You are not getting better after taking an antibiotic for 2 days.     Your symptoms go away but then come back.   Where can you learn more?  Go to https://www.[employer].net/patiented  Enter K848 in the search box to learn more about \"Urinary Tract Infection (UTI) in Women: Care Instructions.\"  Current as of: November 15, 2023               Content Version: 14.0    8188-5023 [employer].   Care instructions adapted under license by your healthcare professional. If you have questions about a medical condition or this instruction, always ask your healthcare professional. [employer] disclaims any warranty or liability for your use of this information.      "

## 2024-03-24 LAB — BACTERIA UR CULT: ABNORMAL

## 2024-03-26 ENCOUNTER — TELEPHONE (OUTPATIENT)
Dept: ONCOLOGY | Facility: HOSPITAL | Age: 39
End: 2024-03-26
Payer: COMMERCIAL

## 2024-03-27 ENCOUNTER — PATIENT OUTREACH (OUTPATIENT)
Dept: ONCOLOGY | Facility: HOSPITAL | Age: 39
End: 2024-03-27
Payer: COMMERCIAL

## 2024-04-10 ENCOUNTER — HOSPITAL ENCOUNTER (OUTPATIENT)
Dept: CT IMAGING | Facility: HOSPITAL | Age: 39
Discharge: HOME | End: 2024-04-10
Attending: INTERNAL MEDICINE
Payer: COMMERCIAL

## 2024-04-10 DIAGNOSIS — C91.10 CHRONIC LYMPHOCYTIC LEUKEMIA (H): ICD-10-CM

## 2024-04-10 PROCEDURE — 250N000011 HC RX IP 250 OP 636: Performed by: INTERNAL MEDICINE

## 2024-04-10 PROCEDURE — 70491 CT SOFT TISSUE NECK W/DYE: CPT

## 2024-04-10 PROCEDURE — 71260 CT THORAX DX C+: CPT

## 2024-04-10 PROCEDURE — 250N000009 HC RX 250: Performed by: INTERNAL MEDICINE

## 2024-04-10 RX ORDER — IOPAMIDOL 755 MG/ML
90 INJECTION, SOLUTION INTRAVASCULAR ONCE
Status: COMPLETED | OUTPATIENT
Start: 2024-04-10 | End: 2024-04-10

## 2024-04-10 RX ADMIN — IOPAMIDOL 90 ML: 755 INJECTION, SOLUTION INTRAVENOUS at 07:32

## 2024-04-10 RX ADMIN — SODIUM CHLORIDE 90 ML: 9 INJECTION, SOLUTION INTRAVENOUS at 07:43

## 2024-04-25 ENCOUNTER — PATIENT OUTREACH (OUTPATIENT)
Dept: ONCOLOGY | Facility: HOSPITAL | Age: 39
End: 2024-04-25
Payer: COMMERCIAL

## 2024-04-25 NOTE — PROGRESS NOTES
[facility]: Cancer Care                                                                                      [facility]    Signature:  Emily Vazquez RN

## 2024-05-08 DIAGNOSIS — K76.9 LIVER LESION: Primary | ICD-10-CM

## 2024-05-09 ENCOUNTER — PATIENT OUTREACH (OUTPATIENT)
Dept: ONCOLOGY | Facility: HOSPITAL | Age: 39
End: 2024-05-09
Payer: COMMERCIAL

## 2024-06-03 ENCOUNTER — HOSPITAL ENCOUNTER (OUTPATIENT)
Dept: MRI IMAGING | Facility: HOSPITAL | Age: 39
Discharge: HOME | End: 2024-06-03
Attending: INTERNAL MEDICINE
Payer: COMMERCIAL

## 2024-06-03 ENCOUNTER — LAB (OUTPATIENT)
Dept: INFUSION THERAPY | Facility: HOSPITAL | Age: 39
End: 2024-06-03
Attending: INTERNAL MEDICINE
Payer: COMMERCIAL

## 2024-06-03 DIAGNOSIS — K76.9 LIVER LESION: ICD-10-CM

## 2024-06-03 DIAGNOSIS — C91.10 CHRONIC LYMPHOCYTIC LEUKEMIA (H): ICD-10-CM

## 2024-06-03 LAB
ALBUMIN SERPL BCG-MCNC: 4.5 G/DL (ref 3.5–5.2)
ALP SERPL-CCNC: 107 U/L (ref 40–150)
ALT SERPL W P-5'-P-CCNC: 15 U/L (ref 0–50)
ANION GAP SERPL CALCULATED.3IONS-SCNC: 11 MMOL/L (ref 7–15)
AST SERPL W P-5'-P-CCNC: 16 U/L (ref 0–45)
BASOPHILS # BLD AUTO: ABNORMAL 10*3/UL
BASOPHILS # BLD MANUAL: 0 10E3/UL (ref 0–0.2)
BASOPHILS NFR BLD AUTO: ABNORMAL %
BASOPHILS NFR BLD MANUAL: 0 %
BILIRUB SERPL-MCNC: 0.4 MG/DL
BUN SERPL-MCNC: 7.7 MG/DL (ref 6–20)
CALCIUM SERPL-MCNC: 8.9 MG/DL (ref 8.6–10)
CHLORIDE SERPL-SCNC: 105 MMOL/L (ref 98–107)
CREAT SERPL-MCNC: 0.78 MG/DL (ref 0.51–0.95)
DEPRECATED HCO3 PLAS-SCNC: 23 MMOL/L (ref 22–29)
EGFRCR SERPLBLD CKD-EPI 2021: >90 ML/MIN/1.73M2
EOSINOPHIL # BLD AUTO: ABNORMAL 10*3/UL
EOSINOPHIL # BLD MANUAL: 0.2 10E3/UL (ref 0–0.7)
EOSINOPHIL NFR BLD AUTO: ABNORMAL %
EOSINOPHIL NFR BLD MANUAL: 1 %
ERYTHROCYTE [DISTWIDTH] IN BLOOD BY AUTOMATED COUNT: 12.8 % (ref 10–15)
GLUCOSE SERPL-MCNC: 100 MG/DL (ref 70–99)
HCT VFR BLD AUTO: 40 % (ref 35–47)
HGB BLD-MCNC: 13.4 G/DL (ref 11.7–15.7)
IMM GRANULOCYTES # BLD: ABNORMAL 10*3/UL
IMM GRANULOCYTES NFR BLD: ABNORMAL %
LAB DIRECTOR COMMENTS: ABNORMAL
LAB DIRECTOR DISCLAIMER: ABNORMAL
LAB DIRECTOR INTERPRETATION: ABNORMAL
LAB DIRECTOR METHODOLOGY: ABNORMAL
LAB DIRECTOR RESULTS: ABNORMAL
LDH SERPL L TO P-CCNC: 181 U/L (ref 0–250)
LYMPHOCYTES # BLD AUTO: ABNORMAL 10*3/UL
LYMPHOCYTES # BLD MANUAL: 19 10E3/UL (ref 0.8–5.3)
LYMPHOCYTES NFR BLD AUTO: ABNORMAL %
LYMPHOCYTES NFR BLD MANUAL: 78 %
MCH RBC QN AUTO: 29.9 PG (ref 26.5–33)
MCHC RBC AUTO-ENTMCNC: 33.5 G/DL (ref 31.5–36.5)
MCV RBC AUTO: 89 FL (ref 78–100)
MONOCYTES # BLD AUTO: ABNORMAL 10*3/UL
MONOCYTES # BLD MANUAL: 1 10E3/UL (ref 0–1.3)
MONOCYTES NFR BLD AUTO: ABNORMAL %
MONOCYTES NFR BLD MANUAL: 4 %
NEUTROPHILS # BLD AUTO: ABNORMAL 10*3/UL
NEUTROPHILS # BLD MANUAL: 4.1 10E3/UL (ref 1.6–8.3)
NEUTROPHILS NFR BLD AUTO: ABNORMAL %
NEUTROPHILS NFR BLD MANUAL: 17 %
NRBC # BLD AUTO: 0 10E3/UL
NRBC BLD AUTO-RTO: 0 /100
PLAT MORPH BLD: ABNORMAL
PLATELET # BLD AUTO: 272 10E3/UL (ref 150–450)
POTASSIUM SERPL-SCNC: 4.2 MMOL/L (ref 3.4–5.3)
PROT SERPL-MCNC: 7.5 G/DL (ref 6.4–8.3)
RBC # BLD AUTO: 4.48 10E6/UL (ref 3.8–5.2)
RBC MORPH BLD: ABNORMAL
SODIUM SERPL-SCNC: 139 MMOL/L (ref 135–145)
SPECIMEN DESCRIPTION: ABNORMAL
WBC # BLD AUTO: 24.4 10E3/UL (ref 4–11)

## 2024-06-03 PROCEDURE — 36415 COLL VENOUS BLD VENIPUNCTURE: CPT | Performed by: INTERNAL MEDICINE

## 2024-06-03 PROCEDURE — 80053 COMPREHEN METABOLIC PANEL: CPT

## 2024-06-03 PROCEDURE — 81261 IGH GENE REARRANGE AMP METH: CPT | Performed by: INTERNAL MEDICINE

## 2024-06-03 PROCEDURE — 85007 BL SMEAR W/DIFF WBC COUNT: CPT

## 2024-06-03 PROCEDURE — 85014 HEMATOCRIT: CPT

## 2024-06-03 PROCEDURE — 86803 HEPATITIS C AB TEST: CPT

## 2024-06-03 PROCEDURE — A9585 GADOBUTROL INJECTION: HCPCS | Performed by: INTERNAL MEDICINE

## 2024-06-03 PROCEDURE — G0452 MOLECULAR PATHOLOGY INTERPR: HCPCS | Mod: 26 | Performed by: STUDENT IN AN ORGANIZED HEALTH CARE EDUCATION/TRAINING PROGRAM

## 2024-06-03 PROCEDURE — 255N000002 HC RX 255 OP 636: Performed by: INTERNAL MEDICINE

## 2024-06-03 PROCEDURE — 83615 LACTATE (LD) (LDH) ENZYME: CPT

## 2024-06-03 PROCEDURE — 86706 HEP B SURFACE ANTIBODY: CPT

## 2024-06-03 PROCEDURE — 87340 HEPATITIS B SURFACE AG IA: CPT

## 2024-06-03 PROCEDURE — 86704 HEP B CORE ANTIBODY TOTAL: CPT

## 2024-06-03 PROCEDURE — 74183 MRI ABD W/O CNTR FLWD CNTR: CPT

## 2024-06-03 RX ORDER — GADOBUTROL 604.72 MG/ML
0.1 INJECTION INTRAVENOUS ONCE
Status: COMPLETED | OUTPATIENT
Start: 2024-06-03 | End: 2024-06-03

## 2024-06-03 RX ADMIN — GADOBUTROL 11 ML: 604.72 INJECTION INTRAVENOUS at 14:23

## 2024-06-04 LAB
HBV CORE AB SERPL QL IA: NONREACTIVE
HBV SURFACE AB SERPL IA-ACNC: 950 M[IU]/ML
HBV SURFACE AB SERPL IA-ACNC: REACTIVE M[IU]/ML
HCV AB SERPL QL IA: NONREACTIVE

## 2024-06-05 LAB — HBV SURFACE AG SERPL QL IA: NONREACTIVE

## 2024-07-03 ENCOUNTER — ONCOLOGY VISIT (OUTPATIENT)
Dept: ONCOLOGY | Facility: HOSPITAL | Age: 39
End: 2024-07-03
Attending: INTERNAL MEDICINE
Payer: COMMERCIAL

## 2024-07-03 VITALS
HEART RATE: 75 BPM | DIASTOLIC BLOOD PRESSURE: 78 MMHG | BODY MASS INDEX: 36.33 KG/M2 | SYSTOLIC BLOOD PRESSURE: 134 MMHG | RESPIRATION RATE: 16 BRPM | WEIGHT: 259.5 LBS | TEMPERATURE: 98.4 F | HEIGHT: 71 IN | OXYGEN SATURATION: 99 %

## 2024-07-03 DIAGNOSIS — C91.10 CLL (CHRONIC LYMPHOCYTIC LEUKEMIA) (H): Primary | ICD-10-CM

## 2024-07-03 PROCEDURE — 99214 OFFICE O/P EST MOD 30 MIN: CPT | Performed by: INTERNAL MEDICINE

## 2024-07-03 PROCEDURE — G2211 COMPLEX E/M VISIT ADD ON: HCPCS | Performed by: INTERNAL MEDICINE

## 2024-07-03 ASSESSMENT — PAIN SCALES - GENERAL: PAINLEVEL: NO PAIN (0)

## 2024-07-03 NOTE — LETTER
7/3/2024      Ani Aquino  [address] [address]      Dear Colleague,    Thank you for referring your patient, Ani Aquino, to the [facility] [place]. Please see a copy of my visit note below.        [facility] Hematology and Oncology Progress Note    Patient: Ani Aquino  MRN: [id number]  7/03/24        Reason for Visit    Chief Complaint   Patient presents with     Oncology Clinic Visit     Return visit 3 months with lab. MRI 06/03/24. Chronic lymphocytic leukemia.         Problem List Items Addressed This Visit    None  Visit Diagnoses       CLL (chronic lymphocytic leukemia) (H)    -  Primary                Assessment and Plan  Chronic lymphocytic leukemia  Youngblood stage I  Cytogenetics: 13 q. deletion present, no evidence of 17 P deletion  Incidentally found elevated white count.  Peripheral smear showed absolute lymphocytosis which appears mature.  Flow cytometry showed monotypic kappa restricted B cells, CD5 positive, CD19 positive, CD20 dim, CD23 positive and lacked CD10, CD38, CD49d and CD79b.  No aberrant expression of any T-cell markers.  FISH on peripheral blood came back showing deletion 13 q.  No evidence of 11: 14 translocation or 17 P deletion.    Youngblood stage I with lymphocytosis and lymphadenopathy.  No evidence of splenomegaly.  Labs from last month reviewed today.  CBC shows a total white count of 24,000.  Predominantly lymphocytes.  Normal hemoglobin and platelet counts.  Normal LDH at 181.  Normal serum chemistry.  On clinical exam she does have some cervical lymphadenopathy.  No significant lymphadenopathy elsewhere.  No B symptoms.  Currently she does not have any clinical indications for treatment.  Will continue surveillance with periodic lab and physical exam.  I will see her back in 6 months with repeat labs.  She is agreeable to the plan.    She continues to have some skin rash in her neck area.  She also has positive BELINDA.  Previously she has been  evaluated by rheumatology.  No diagnosis has been made yet.  Does not have any other rheumatological issues.    Liver lesion  Previously CT scan did show some nodular infiltrate in the liver.  Concerning for lymphoma versus something else.  I dad obtained an MRI.  I reviewed the MRI images and report personally and independently interpreted the results.  It is showing focal nodular fatty infiltration.  No concerns for any abnormal or suspicious lesions.     Cancer Staging   No matching staging information was found for the patient.      ECOG Performance    0 - Independent         Problem List    Patient Active Problem List   Diagnosis     History of      Irregular heart beat     Hip pain, right     SI (sacroiliac) joint dysfunction     Palpitations            Interval History   Ani Aquino is a 38 year old female with CLL here for follow-up.    She has a new diagnosis of CLL which was discovered after she was incidentally noted to have elevated white count which was predominantly lymphocytes.  I ordered.  Entry which came back showing monoclonal B cells with immunophenotype most consistent with CLL.  Cytogenetics showed 13 q. deletion.  No 11: 14 translocation on 17 P deletion.  On CT scan she had several lymph nodes above and below the diaphragm but not meeting bulky disease criteria.  She also had some abnormality in the liver which was evaluated by an MRI.  Denies any new issues today.  She continues to have some skin rash in her chest and face on and off.  No fever chills or night sweats.  No enlarging lymph nodes reported.  No weight loss.  No bruising or bleeding.  No recurrent infections.        Review of Systems  A comprehensive review of systems was negative except for what is noted in the interval history    Current Outpatient Medications   Medication Sig Dispense Refill     DEBLITANE 0.35 MG per tablet TAKE ONE TABLET BY MOUTH EVERY DAY (Patient not taking: Reported on 2024) 84 tablet 3  "    No current facility-administered medications for this visit.        Physical Exam    Failed to redirect to the Timeline version of the [employer] SmartLink.    General: alert and cooperative      Lab Results    No results found for this or any previous visit (from the past 168 hour(s)).    Imaging    No results found.    A total of 35 min was spent today on this visit which included face to face conversation with the patient, EMR review, counseling and co-ordination of care and medical documentation.      The longitudinal plan of care for the diagnosis(es)/condition(s) as documented were addressed during this visit. Due to the added complexity in care, I will continue to support Ani in the subsequent management and with ongoing continuity of care.      Signed by: Jory Witt MD      Oncology Rooming Note    July 3, 2024 2:09 PM   Ani Aquino is a 38 year old female who presents for:    Chief Complaint   Patient presents with     Oncology Clinic Visit     Return visit 3 months with lab. MRI 06/03/24. Chronic lymphocytic leukemia.     Initial Vitals: /78 (BP Location: Left arm, Patient Position: Sitting, Cuff Size: Adult Large)   Pulse 75   Temp 98.4  F (36.9  C) (Oral)   Resp 16   Ht 1.803 m (5' 11\")   Wt 117.7 kg (259 lb 8 oz)   LMP 07/02/2024 (Exact Date)   SpO2 99%   BMI 36.19 kg/m   Estimated body mass index is 36.19 kg/m  as calculated from the following:    Height as of this encounter: 1.803 m (5' 11\").    Weight as of this encounter: 117.7 kg (259 lb 8 oz). Body surface area is 2.43 meters squared.  No Pain (0) Comment: Data Unavailable   Patient's last menstrual period was 07/02/2024 (exact date).  Allergies reviewed: Yes  Medications reviewed: Yes    Medications: Medication refills not needed today.  Pharmacy name entered into [employer] Web Services:    [facility] #[id number] - [place] [address] AT [facility] & [address]  [place], [place] - " [address]  [facility] #[id number] - [place], [place] - [facility] AT [facility] [address]  [facility] [place], [place] - [address] [facility]    Frailty Screening:   Is the patient here for a new oncology consult visit in cancer care? 2. No      Clinical concerns: none       Joanne Jay CMA                Again, thank you for allowing me to participate in the care of your patient.        Sincerely,        Jory Witt MD

## 2024-07-03 NOTE — PROGRESS NOTES
"Oncology Rooming Note    July 3, 2024 2:09 PM   Ani Aquino is a 38 year old female who presents for:    Chief Complaint   Patient presents with    Oncology Clinic Visit     Return visit 3 months with lab. MRI 06/03/24. Chronic lymphocytic leukemia.     Initial Vitals: /78 (BP Location: Left arm, Patient Position: Sitting, Cuff Size: Adult Large)   Pulse 75   Temp 98.4  F (36.9  C) (Oral)   Resp 16   Ht 1.803 m (5' 11\")   Wt 117.7 kg (259 lb 8 oz)   LMP 07/02/2024 (Exact Date)   SpO2 99%   BMI 36.19 kg/m   Estimated body mass index is 36.19 kg/m  as calculated from the following:    Height as of this encounter: 1.803 m (5' 11\").    Weight as of this encounter: 117.7 kg (259 lb 8 oz). Body surface area is 2.43 meters squared.  No Pain (0) Comment: Data Unavailable   Patient's last menstrual period was 07/02/2024 (exact date).  Allergies reviewed: Yes  Medications reviewed: Yes    Medications: Medication refills not needed today.  Pharmacy name entered into [facility]:    [facility] #[id number] - [place] [address] AT [facility] OF [facility] & [address]  [place] [facility] [address] [place]  [facility] #[id number] [facility] [address] AT [facility] OF [address] [facility] [address] [facility]    Frailty Screening:   Is the patient here for a new oncology consult visit in cancer care? 2. No      Clinical concerns: none       Joanne Jay CMA              "

## 2024-07-03 NOTE — PROGRESS NOTES
[facility] Hematology and Oncology Progress Note    Patient: Ani Aquino  MRN: [id number]  7/03/24        Reason for Visit    Chief Complaint   Patient presents with    Oncology Clinic Visit     Return visit 3 months with lab. MRI 06/03/24. Chronic lymphocytic leukemia.         Problem List Items Addressed This Visit    None  Visit Diagnoses       CLL (chronic lymphocytic leukemia) (H)    -  Primary                Assessment and Plan  Chronic lymphocytic leukemia  Youngblood stage I  Cytogenetics: 13 q. deletion present, no evidence of 17 P deletion  Incidentally found elevated white count.  Peripheral smear showed absolute lymphocytosis which appears mature.  Flow cytometry showed monotypic kappa restricted B cells, CD5 positive, CD19 positive, CD20 dim, CD23 positive and lacked CD10, CD38, CD49d and CD79b.  No aberrant expression of any T-cell markers.  FISH on peripheral blood came back showing deletion 13 q.  No evidence of 11: 14 translocation or 17 P deletion.    Youngblood stage I with lymphocytosis and lymphadenopathy.  No evidence of splenomegaly.  Labs from last month reviewed today.  CBC shows a total white count of 24,000.  Predominantly lymphocytes.  Normal hemoglobin and platelet counts.  Normal LDH at 181.  Normal serum chemistry.  On clinical exam she does have some cervical lymphadenopathy.  No significant lymphadenopathy elsewhere.  No B symptoms.  Currently she does not have any clinical indications for treatment.  Will continue surveillance with periodic lab and physical exam.  I will see her back in 6 months with repeat labs.  She is agreeable to the plan.    She continues to have some skin rash in her neck area.  She also has positive BELINDA.  Previously she has been evaluated by rheumatology.  No diagnosis has been made yet.  Does not have any other rheumatological issues.    Liver lesion  Previously CT scan did show some nodular infiltrate in the liver.  Concerning for lymphoma versus something  else.  I dad obtained an MRI.  I reviewed the MRI images and report personally and independently interpreted the results.  It is showing focal nodular fatty infiltration.  No concerns for any abnormal or suspicious lesions.     Cancer Staging   No matching staging information was found for the patient.      ECOG Performance    0 - Independent         Problem List    Patient Active Problem List   Diagnosis    History of     Irregular heart beat    Hip pain, right    SI (sacroiliac) joint dysfunction    Palpitations            Interval History   Ani Aquino is a 38 year old female with CLL here for follow-up.    She has a new diagnosis of CLL which was discovered after she was incidentally noted to have elevated white count which was predominantly lymphocytes.  I ordered.  Entry which came back showing monoclonal B cells with immunophenotype most consistent with CLL.  Cytogenetics showed 13 q. deletion.  No 11: 14 translocation on 17 P deletion.  On CT scan she had several lymph nodes above and below the diaphragm but not meeting bulky disease criteria.  She also had some abnormality in the liver which was evaluated by an MRI.  Denies any new issues today.  She continues to have some skin rash in her chest and face on and off.  No fever chills or night sweats.  No enlarging lymph nodes reported.  No weight loss.  No bruising or bleeding.  No recurrent infections.        Review of Systems  A comprehensive review of systems was negative except for what is noted in the interval history    Current Outpatient Medications   Medication Sig Dispense Refill    DEBLITANE 0.35 MG per tablet TAKE ONE TABLET BY MOUTH EVERY DAY (Patient not taking: Reported on 2024) 84 tablet 3     No current facility-administered medications for this visit.        Physical Exam    Failed to redirect to the Timeline version of the [facility].    General: alert and cooperative      Lab Results    No results found for this or any  previous visit (from the past 168 hour(s)).    Imaging    No results found.    A total of 35 min was spent today on this visit which included face to face conversation with the patient, EMR review, counseling and co-ordination of care and medical documentation.      The longitudinal plan of care for the diagnosis(es)/condition(s) as documented were addressed during this visit. Due to the added complexity in care, I will continue to support Ani in the subsequent management and with ongoing continuity of care.      Signed by: Jory Witt MD

## 2024-07-13 ENCOUNTER — HEALTH MAINTENANCE LETTER (OUTPATIENT)
Age: 39
End: 2024-07-13

## 2024-09-09 ENCOUNTER — PATIENT OUTREACH (OUTPATIENT)
Dept: CARE COORDINATION | Facility: CLINIC | Age: 39
End: 2024-09-09
Payer: COMMERCIAL

## 2025-01-07 DIAGNOSIS — D72.829 LEUKOCYTOSIS, UNSPECIFIED TYPE: ICD-10-CM

## 2025-01-07 DIAGNOSIS — C91.10 CHRONIC LYMPHOCYTIC LEUKEMIA (H): Primary | ICD-10-CM

## 2025-01-07 DIAGNOSIS — K76.9 LIVER LESION: ICD-10-CM

## 2025-01-08 ENCOUNTER — DOCUMENTATION ONLY (OUTPATIENT)
Dept: ONCOLOGY | Facility: HOSPITAL | Age: 40
End: 2025-01-08

## 2025-01-08 ENCOUNTER — LAB (OUTPATIENT)
Dept: INFUSION THERAPY | Facility: HOSPITAL | Age: 40
End: 2025-01-08
Payer: COMMERCIAL

## 2025-01-08 ENCOUNTER — ONCOLOGY VISIT (OUTPATIENT)
Dept: ONCOLOGY | Facility: HOSPITAL | Age: 40
End: 2025-01-08
Payer: COMMERCIAL

## 2025-01-08 VITALS
DIASTOLIC BLOOD PRESSURE: 83 MMHG | HEIGHT: 71 IN | TEMPERATURE: 98.4 F | SYSTOLIC BLOOD PRESSURE: 131 MMHG | BODY MASS INDEX: 34.83 KG/M2 | HEART RATE: 84 BPM | WEIGHT: 248.8 LBS | RESPIRATION RATE: 16 BRPM | OXYGEN SATURATION: 99 %

## 2025-01-08 DIAGNOSIS — K76.9 LIVER LESION: ICD-10-CM

## 2025-01-08 DIAGNOSIS — C83.00 SMALL LYMPHOCYTIC LYMPHOMA (H): ICD-10-CM

## 2025-01-08 DIAGNOSIS — D72.829 LEUKOCYTOSIS, UNSPECIFIED TYPE: ICD-10-CM

## 2025-01-08 DIAGNOSIS — C91.10 CHRONIC LYMPHOCYTIC LEUKEMIA (H): ICD-10-CM

## 2025-01-08 DIAGNOSIS — C91.10 CHRONIC LYMPHOCYTIC LEUKEMIA (H): Primary | ICD-10-CM

## 2025-01-08 LAB
ALBUMIN SERPL BCG-MCNC: 4.7 G/DL (ref 3.5–5.2)
ALP SERPL-CCNC: 95 U/L (ref 40–150)
ALT SERPL W P-5'-P-CCNC: 12 U/L (ref 0–50)
ANION GAP SERPL CALCULATED.3IONS-SCNC: 11 MMOL/L (ref 7–15)
AST SERPL W P-5'-P-CCNC: 16 U/L (ref 0–45)
BASOPHILS # BLD MANUAL: 0 10E3/UL (ref 0–0.2)
BASOPHILS NFR BLD MANUAL: 0 %
BILIRUB SERPL-MCNC: 0.5 MG/DL
BUN SERPL-MCNC: 11.1 MG/DL (ref 6–20)
CALCIUM SERPL-MCNC: 9.4 MG/DL (ref 8.8–10.4)
CHLORIDE SERPL-SCNC: 107 MMOL/L (ref 98–107)
CREAT SERPL-MCNC: 0.79 MG/DL (ref 0.51–0.95)
EGFRCR SERPLBLD CKD-EPI 2021: >90 ML/MIN/1.73M2
EOSINOPHIL # BLD MANUAL: 0 10E3/UL (ref 0–0.7)
EOSINOPHIL NFR BLD MANUAL: 0 %
ERYTHROCYTE [DISTWIDTH] IN BLOOD BY AUTOMATED COUNT: 12.7 % (ref 10–15)
GLUCOSE SERPL-MCNC: 91 MG/DL (ref 70–99)
HCO3 SERPL-SCNC: 23 MMOL/L (ref 22–29)
HCT VFR BLD AUTO: 40.6 % (ref 35–47)
HGB BLD-MCNC: 13.8 G/DL (ref 11.7–15.7)
LDH SERPL L TO P-CCNC: 182 U/L (ref 0–250)
LYMPHOCYTES # BLD MANUAL: 35.8 10E3/UL (ref 0.8–5.3)
LYMPHOCYTES NFR BLD MANUAL: 71 %
MCH RBC QN AUTO: 30.2 PG (ref 26.5–33)
MCHC RBC AUTO-ENTMCNC: 34 G/DL (ref 31.5–36.5)
MCV RBC AUTO: 89 FL (ref 78–100)
MONOCYTES # BLD MANUAL: 0 10E3/UL (ref 0–1.3)
MONOCYTES NFR BLD MANUAL: 0 %
NEUTROPHILS # BLD MANUAL: 11.1 10E3/UL (ref 1.6–8.3)
NEUTROPHILS NFR BLD MANUAL: 22 %
OTHER CELLS # BLD MANUAL: 3.5 10E3/UL
OTHER CELLS NFR BLD MANUAL: 7 %
PATH REV: ABNORMAL
PLAT MORPH BLD: ABNORMAL
PLATELET # BLD AUTO: 247 10E3/UL (ref 150–450)
POTASSIUM SERPL-SCNC: 4.1 MMOL/L (ref 3.4–5.3)
PROT SERPL-MCNC: 7.5 G/DL (ref 6.4–8.3)
RBC # BLD AUTO: 4.57 10E6/UL (ref 3.8–5.2)
RBC MORPH BLD: ABNORMAL
SODIUM SERPL-SCNC: 141 MMOL/L (ref 135–145)
WBC # BLD AUTO: 50.4 10E3/UL (ref 4–11)

## 2025-01-08 PROCEDURE — G2211 COMPLEX E/M VISIT ADD ON: HCPCS | Performed by: INTERNAL MEDICINE

## 2025-01-08 PROCEDURE — 82040 ASSAY OF SERUM ALBUMIN: CPT

## 2025-01-08 PROCEDURE — 85014 HEMATOCRIT: CPT

## 2025-01-08 PROCEDURE — 36415 COLL VENOUS BLD VENIPUNCTURE: CPT

## 2025-01-08 PROCEDURE — 83615 LACTATE (LD) (LDH) ENZYME: CPT

## 2025-01-08 PROCEDURE — 99214 OFFICE O/P EST MOD 30 MIN: CPT | Performed by: INTERNAL MEDICINE

## 2025-01-08 PROCEDURE — 85007 BL SMEAR W/DIFF WBC COUNT: CPT

## 2025-01-08 ASSESSMENT — PAIN SCALES - GENERAL: PAINLEVEL_OUTOF10: NO PAIN (0)

## 2025-01-08 NOTE — PROGRESS NOTES
[facility] Hematology and Oncology Progress Note    Patient: Ani Aquino  MRN: [id number]  [birth year]        Reason for Visit    Chief Complaint   Patient presents with    Oncology Clinic Visit     Chronic lymphocytic leukemia; liver lesion; leukocytosis          Problem List Items Addressed This Visit    None  Visit Diagnoses       Chronic lymphocytic leukemia (H)    -  Primary    Relevant Orders    CT Chest/Abdomen/Pelvis w Contrast    CT Soft tissue neck w & w/o contrast    IGH@ VARIABLE REGION SOMATIC MUTATION ANALYSIS    Small lymphocytic lymphoma (H)        Relevant Orders    CT Chest/Abdomen/Pelvis w Contrast    CT Soft tissue neck w & w/o contrast                  Assessment and Plan  Chronic lymphocytic leukemia  Youngblood stage I  Cytogenetics: 13 q. deletion present, no evidence of 17 P deletion  Incidentally found elevated white count.  Peripheral smear showed absolute lymphocytosis which appears mature.  Flow cytometry showed monotypic kappa restricted B cells, CD5 positive, CD19 positive, CD20 dim, CD23 positive and lacked CD10, CD38, CD49d and CD79b.  No aberrant expression of any T-cell markers.  FISH on peripheral blood came back showing deletion 13 q.  No evidence of 11: 14 translocation or 17 P deletion.    Youngblood stage I with lymphocytosis and lymphadenopathy.  No evidence of splenomegaly.      Labs reviewed today.  Her CBC shows elevated white count however the actual count is still pending.  Looks like a differential count is being checked.  Normal hemoglobin 13.8.  Normal platelet count of 247.  Serum chemistry within completely normal limits.  LDH normal at 182.    Clinical exam she does have significant bilateral cervical adenopathy left more than right and bilateral supraclavicular adenopathy left more than right.  Also has a couple of axillary lymph nodes.  Couple of the submandibular lymph nodes are also enlarged.  I think overall looks like her lymphadenopathy has increased.  But  clinically she is asymptomatic.  I again reviewed management of CLL in her case.  She is pretty young and would require treatment at some point in time in the near future.  She has 13 q. deletion without any evidence of 17P deletion in the past so mostly has good risk cytogenetics.  Plan is to repeat her labs and CT chest abdomen pelvis and CT of the soft tissue neck in 3 months.  I will also order immunoglobulin heavy chain mutation analysis.  If she is IGHV mutated then she could be a candidate for chemoimmunotherapy if and when she becomes eligible for treatment.  Right now she does not have any B temps or other absolute indications for treatment.  She is agreeable to the plan.    Liver lesion  Showed focal nodular fatty infiltration.  No concerns for any abnormal lesion.  Will continue to monitor.     Cancer Staging   No matching staging information was found for the patient.      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    Her CLL/SLL was discovered after she was incidentally noted to have elevated white count which was predominantly lymphocytes.  Flow cytometry showed monoclonal B cells with immunophenotype most consistent with CLL.  Cytogenetics showed 13 q. deletion.  No 11: 14 translocation or 17 P deletion.  On CT scan done in April of last year showed extensive lymphadenopathy above and below the diaphragm which did not meet the criteria for bulky disease.  I been following her closely with repeat labs.    Today she is not complaining of any specific symptoms.  She has noticed however couple of the neck lymph nodes have increased in size.  No fevers chills or night sweats.  No weight loss.  No recurrent infections.  Here with  repeat labs.        Review of Systems  A comprehensive review of systems was negative except for what is noted in the interval history    Current Outpatient Medications   Medication Sig Dispense Refill    DEBLITANE 0.35 MG per tablet TAKE ONE TABLET BY MOUTH EVERY DAY (Patient not taking: Reported on 1/18/2024) 84 tablet 3     No current facility-administered medications for this visit.        Physical Exam    Failed to redirect to the Timeline version of the [facility] SmartLink.    General: alert and cooperative  Lymph nodes: Bilateral cervical, supraclavicular  Lymphadenopathy, left more than right      Lab Results    Recent Results (from the past week)   Comprehensive metabolic panel (BMP + Alb, Alk Phos, ALT, AST, Total. Bili, TP)   Result Value Ref Range    Sodium 141 135 - 145 mmol/L    Potassium 4.1 3.4 - 5.3 mmol/L    Carbon Dioxide (CO2) 23 22 - 29 mmol/L    Anion Gap 11 7 - 15 mmol/L    Urea Nitrogen 11.1 6.0 - 20.0 mg/dL    Creatinine 0.79 0.51 - 0.95 mg/dL    GFR Estimate >90 >60 mL/min/1.73m2    Calcium 9.4 8.8 - 10.4 mg/dL    Chloride 107 98 - 107 mmol/L    Glucose 91 70 - 99 mg/dL    Alkaline Phosphatase 95 40 - 150 U/L    AST 16 0 - 45 U/L    ALT 12 0 - 50 U/L    Protein Total 7.5 6.4 - 8.3 g/dL    Albumin 4.7 3.5 - 5.2 g/dL    Bilirubin Total 0.5 <=1.2 mg/dL   Lactate Dehydrogenase   Result Value Ref Range    Lactate Dehydrogenase 182 0 - 250 U/L   CBC with platelets and differential   Result Value Ref Range    WBC Count      RBC Count 4.57 3.80 - 5.20 10e6/uL    Hemoglobin 13.8 11.7 - 15.7 g/dL    Hematocrit 40.6 35.0 - 47.0 %    MCV 89 78 - 100 fL    MCH 30.2 26.5 - 33.0 pg    MCHC 34.0 31.5 - 36.5 g/dL    RDW 12.7 10.0 - 15.0 %    Platelet Count 247 150 - 450 10e3/uL       Imaging    No results found.    A total of 35 min was spent today on this visit which included face to face conversation with the patient, EMR review, counseling and co-ordination of care and medical documentation.    The  longitudinal plan of care for the diagnosis(es)/condition(s) as documented were addressed during this visit. Due to the added complexity in care, I will continue to support Ani in the subsequent management and with ongoing continuity of care.    I independently reviewed and interpreted lab studies and imaging, reviewed pertinent notes from physicians and care providers from other specialties and ordered lab tests.      Signed by: Jory Witt MD

## 2025-01-08 NOTE — PROGRESS NOTES
"Oncology Rooming Note    January 8, 2025 1:24 PM   Ani Aquino is a 39 year old female who presents for:    Chief Complaint   Patient presents with    Oncology Clinic Visit     Chronic lymphocytic leukemia; liver lesion; leukocytosis      Initial Vitals: /83   Pulse 84   Temp 98.4  F (36.9  C)   Resp 16   Ht 1.803 m (5' 11\")   Wt 112.9 kg (248 lb 12.8 oz)   SpO2 99%   BMI 34.70 kg/m   Estimated body mass index is 34.7 kg/m  as calculated from the following:    Height as of this encounter: 1.803 m (5' 11\").    Weight as of this encounter: 112.9 kg (248 lb 12.8 oz). Body surface area is 2.38 meters squared.  No Pain (0) Comment: Data Unavailable   No LMP recorded.  Allergies reviewed: Yes  Medications reviewed: Yes    Medications: Medication refills not needed today.  Pharmacy name entered into EPIC:    [facility] #[id number] - [place], [place] - [address] AT [facility] & [address]  [place] [facility] [address]  [facility] #[id number] - [facility] [address] AT [facility] OF [address] [facility] [address] [facility]    Frailty Screening:   Is the patient here for a new oncology consult visit in cancer care? 2. No      Clinical concerns: None      Desire Witt LPN             "

## 2025-01-08 NOTE — LETTER
1/8/2025      Ani Aquino  [address] [facility] [address]      Dear Colleague,    Thank you for referring your patient, Ani Aquino, to the [facility] [place]. Please see a copy of my visit note below.        [facility] Hematology and Oncology Progress Note    Patient: Ani Aquino  MRN: [id number]  1/08/25        Reason for Visit    Chief Complaint   Patient presents with     Oncology Clinic Visit     Chronic lymphocytic leukemia; liver lesion; leukocytosis          Problem List Items Addressed This Visit    None  Visit Diagnoses       Chronic lymphocytic leukemia (H)    -  Primary    Relevant Orders    CT Chest/Abdomen/Pelvis w Contrast    CT Soft tissue neck w & w/o contrast    IGH@ VARIABLE REGION SOMATIC MUTATION ANALYSIS    Small lymphocytic lymphoma (H)        Relevant Orders    CT Chest/Abdomen/Pelvis w Contrast    CT Soft tissue neck w & w/o contrast                  Assessment and Plan  Chronic lymphocytic leukemia  Youngblood stage I  Cytogenetics: 13 q. deletion present, no evidence of 17 P deletion  Incidentally found elevated white count.  Peripheral smear showed absolute lymphocytosis which appears mature.  Flow cytometry showed monotypic kappa restricted B cells, CD5 positive, CD19 positive, CD20 dim, CD23 positive and lacked CD10, CD38, CD49d and CD79b.  No aberrant expression of any T-cell markers.  FISH on peripheral blood came back showing deletion 13 q.  No evidence of 11: 14 translocation or 17 P deletion.    Youngblood stage I with lymphocytosis and lymphadenopathy.  No evidence of splenomegaly.      Labs reviewed today.  Her CBC shows elevated white count however the actual count is still pending.  Looks like a differential count is being checked.  Normal hemoglobin 13.8.  Normal platelet count of 247.  Serum chemistry within completely normal limits.  LDH normal at 182.    Clinical exam she does have significant bilateral cervical adenopathy left more than right and  bilateral supraclavicular adenopathy left more than right.  Also has a couple of axillary lymph nodes.  Couple of the submandibular lymph nodes are also enlarged.  I think overall looks like her lymphadenopathy has increased.  But clinically she is asymptomatic.  I again reviewed management of CLL in her case.  She is pretty young and would require treatment at some point in time in the near future.  She has 13 q. deletion without any evidence of 17P deletion in the past so mostly has good risk cytogenetics.  Plan is to repeat her labs and CT chest abdomen pelvis and CT of the soft tissue neck in 3 months.  I will also order immunoglobulin heavy chain mutation analysis.  If she is IGHV mutated then she could be a candidate for chemoimmunotherapy if and when she becomes eligible for treatment.  Right now she does not have any B temps or other absolute indications for treatment.  She is agreeable to the plan.    Liver lesion  Showed focal nodular fatty infiltration.  No concerns for any abnormal lesion.  Will continue to monitor.     Cancer Staging   No matching staging information was found for the patient.      ECOG Performance    0 - Independent         Problem List    Patient Active Problem List   Diagnosis     History of      Irregular heart beat     Hip pain, right     SI (sacroiliac) joint dysfunction     Palpitations            Interval History   Ani Aquino is a 38 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    Her CLL/SLL was discovered after she was incidentally noted to have elevated white count which was predominantly lymphocytes.  Flow cytometry showed monoclonal B cells with immunophenotype most consistent with CLL.  Cytogenetics showed 13 q. deletion.  No 11: 14 translocation or 17 P deletion.  On CT scan done in April of last year showed extensive lymphadenopathy above and below the diaphragm which did not meet the criteria for bulky disease.  I been following her closely with repeat  labs.    Today she is not complaining of any specific symptoms.  She has noticed however couple of the neck lymph nodes have increased in size.  No fevers chills or night sweats.  No weight loss.  No recurrent infections.  Here with repeat labs.        Review of Systems  A comprehensive review of systems was negative except for what is noted in the interval history    Current Outpatient Medications   Medication Sig Dispense Refill     DEBLITANE 0.35 MG per tablet TAKE ONE TABLET BY MOUTH EVERY DAY (Patient not taking: Reported on 1/18/2024) 84 tablet 3     No current facility-administered medications for this visit.        Physical Exam    Failed to redirect to the Timeline version of the [facility] SmartLink.    General: alert and cooperative  Lymph nodes: Bilateral cervical, supraclavicular  Lymphadenopathy, left more than right      Lab Results    Recent Results (from the past week)   Comprehensive metabolic panel (BMP + Alb, Alk Phos, ALT, AST, Total. Bili, TP)   Result Value Ref Range    Sodium 141 135 - 145 mmol/L    Potassium 4.1 3.4 - 5.3 mmol/L    Carbon Dioxide (CO2) 23 22 - 29 mmol/L    Anion Gap 11 7 - 15 mmol/L    Urea Nitrogen 11.1 6.0 - 20.0 mg/dL    Creatinine 0.79 0.51 - 0.95 mg/dL    GFR Estimate >90 >60 mL/min/1.73m2    Calcium 9.4 8.8 - 10.4 mg/dL    Chloride 107 98 - 107 mmol/L    Glucose 91 70 - 99 mg/dL    Alkaline Phosphatase 95 40 - 150 U/L    AST 16 0 - 45 U/L    ALT 12 0 - 50 U/L    Protein Total 7.5 6.4 - 8.3 g/dL    Albumin 4.7 3.5 - 5.2 g/dL    Bilirubin Total 0.5 <=1.2 mg/dL   Lactate Dehydrogenase   Result Value Ref Range    Lactate Dehydrogenase 182 0 - 250 U/L   CBC with platelets and differential   Result Value Ref Range    WBC Count      RBC Count 4.57 3.80 - 5.20 10e6/uL    Hemoglobin 13.8 11.7 - 15.7 g/dL    Hematocrit 40.6 35.0 - 47.0 %    MCV 89 78 - 100 fL    MCH 30.2 26.5 - 33.0 pg    MCHC 34.0 31.5 - 36.5 g/dL    RDW 12.7 10.0 - 15.0 %    Platelet Count 247 150 - 450 10e3/uL  "      Imaging    No results found.    A total of 35 min was spent today on this visit which included face to face conversation with the patient, EMR review, counseling and co-ordination of care and medical documentation.    The longitudinal plan of care for the diagnosis(es)/condition(s) as documented were addressed during this visit. Due to the added complexity in care, I will continue to support Ani in the subsequent management and with ongoing continuity of care.    I independently reviewed and interpreted lab studies and imaging, reviewed pertinent notes from physicians and care providers from other specialties and ordered lab tests.      Signed by: Jory Witt MD      Oncology Rooming Note    January 8, 2025 1:24 PM   Ani Aquino is a 39 year old female who presents for:    Chief Complaint   Patient presents with     Oncology Clinic Visit     Chronic lymphocytic leukemia; liver lesion; leukocytosis      Initial Vitals: /83   Pulse 84   Temp 98.4  F (36.9  C)   Resp 16   Ht 1.803 m (5' 11\")   Wt 112.9 kg (248 lb 12.8 oz)   SpO2 99%   BMI 34.70 kg/m   Estimated body mass index is 34.7 kg/m  as calculated from the following:    Height as of this encounter: 1.803 m (5' 11\").    Weight as of this encounter: 112.9 kg (248 lb 12.8 oz). Body surface area is 2.38 meters squared.  No Pain (0) Comment: Data Unavailable   No LMP recorded.  Allergies reviewed: Yes  Medications reviewed: Yes    Medications: Medication refills not needed today.  Pharmacy name entered into EPIC:    [facility] #[id number] - [address] [place] AT [facility] & [address]  [facility] [address] [place] [address] [address]  [facility] #[id number] - [facility] [address] AT [facility] [address] [facility] [place] [id number] [facility]    Frailty Screening:   Is the patient here " for a new oncology consult visit in cancer care? 2. No      Clinical concerns: None      Desire Witt LPN                 Again, thank you for allowing me to participate in the care of your patient.        Sincerely,        Jory Witt MD    Electronically signed

## 2025-01-08 NOTE — PROGRESS NOTES
DATE/TIME OF CALL RECEIVED FROM LAB:  01/08/25 at 1458   LAB TEST:  CBC/diff  LAB VALUE:  WBC 50.4 (may change slightly because they are working on the slide)   PROVIDER NOTIFIED?: Yes  PROVIDER NAME: Dr. Witt and GENE Diaz  DATE/TIME LAB VALUE REPORTED TO PROVIDER: 1/8/25 at 1458  MECHANISM OF PROVIDER NOTIFICATION:  Secure Chat  PROVIDER RESPONSE: Noted. Dr. Witt will see the patient as scheduled today in the clinic to review results and discuss a plan.   Julianne Chua RN on 1/8/2025 at 3:08 PM

## 2025-02-16 ENCOUNTER — OFFICE VISIT (OUTPATIENT)
Dept: URGENT CARE | Facility: URGENT CARE | Age: 40
End: 2025-02-16
Payer: COMMERCIAL

## 2025-02-16 VITALS
SYSTOLIC BLOOD PRESSURE: 139 MMHG | TEMPERATURE: 98.7 F | DIASTOLIC BLOOD PRESSURE: 82 MMHG | RESPIRATION RATE: 12 BRPM | OXYGEN SATURATION: 96 % | HEART RATE: 99 BPM

## 2025-02-16 DIAGNOSIS — N30.00 ACUTE CYSTITIS WITHOUT HEMATURIA: Primary | ICD-10-CM

## 2025-02-16 LAB
ALBUMIN UR-MCNC: NEGATIVE MG/DL
APPEARANCE UR: CLEAR
BACTERIA #/AREA URNS HPF: ABNORMAL /HPF
BILIRUB UR QL STRIP: NEGATIVE
COLOR UR AUTO: YELLOW
GLUCOSE UR STRIP-MCNC: NEGATIVE MG/DL
HGB UR QL STRIP: ABNORMAL
KETONES UR STRIP-MCNC: NEGATIVE MG/DL
LEUKOCYTE ESTERASE UR QL STRIP: ABNORMAL
NITRATE UR QL: NEGATIVE
PH UR STRIP: 6 [PH] (ref 5–8)
RBC #/AREA URNS AUTO: ABNORMAL /HPF
SP GR UR STRIP: <=1.005 (ref 1–1.03)
SQUAMOUS #/AREA URNS AUTO: ABNORMAL /LPF
UROBILINOGEN UR STRIP-ACNC: 0.2 E.U./DL
WBC #/AREA URNS AUTO: ABNORMAL /HPF

## 2025-02-16 PROCEDURE — 81001 URINALYSIS AUTO W/SCOPE: CPT

## 2025-02-16 PROCEDURE — 99213 OFFICE O/P EST LOW 20 MIN: CPT | Performed by: FAMILY MEDICINE

## 2025-02-16 RX ORDER — SULFAMETHOXAZOLE AND TRIMETHOPRIM 800; 160 MG/1; MG/1
1 TABLET ORAL 2 TIMES DAILY
Qty: 14 TABLET | Refills: 0 | Status: SHIPPED | OUTPATIENT
Start: 2025-02-16 | End: 2025-02-23

## 2025-02-16 NOTE — PROGRESS NOTES
(N30.00) Acute cystitis without hematuria  (primary encounter diagnosis)  Comment:   Plan: UA with Microscopic reflex to Culture - Clinic         Collect, UA Microscopic with Reflex to Culture,        sulfamethoxazole-trimethoprim (BACTRIM DS)         800-160 MG tablet          She is to keep her fluid intake up.  Observe.      HISTORY      She has developed some dysuria as of today.    No fevers or chills.  No back or abdominal pain.    Last UTI was 3-2025.      REVIEW OF SYSTEMS    Unremarkable except as above.        EXAM  /82   Pulse 99   Temp 98.7  F (37.1  C) (Tympanic)   Resp 12   SpO2 96%         Results for orders placed or performed in visit on 02/16/25   UA with Microscopic reflex to Culture - Clinic Collect     Status: Abnormal    Specimen: Urine, Clean Catch   Result Value Ref Range    Color Urine Yellow Colorless, Straw, Light Yellow, Yellow    Appearance Urine Clear Clear    Glucose Urine Negative Negative mg/dL    Bilirubin Urine Negative Negative    Ketones Urine Negative Negative mg/dL    Specific Gravity Urine <=1.005 1.005 - 1.030    Blood Urine Large (A) Negative    pH Urine 6.0 5.0 - 8.0    Protein Albumin Urine Negative Negative mg/dL    Urobilinogen Urine 0.2 0.2, 1.0 E.U./dL    Nitrite Urine Negative Negative    Leukocyte Esterase Urine Small (A) Negative   UA Microscopic with Reflex to Culture     Status: Abnormal   Result Value Ref Range    Bacteria Urine Moderate (A) None Seen /HPF    RBC Urine 10-25 (A) 0-2 /HPF /HPF    WBC Urine 5-10 (A) 0-5 /HPF /HPF    Squamous Epithelials Urine Few (A) None Seen /LPF    Narrative    Urine Culture not indicated             Results for orders placed or performed in visit on 02/16/25   UA with Microscopic reflex to Culture - Clinic Collect     Status: Abnormal    Specimen: Urine, Clean Catch   Result Value Ref Range    Color Urine Yellow Colorless, Straw, Light Yellow, Yellow    Appearance Urine Clear Clear    Glucose Urine Negative  Negative mg/dL    Bilirubin Urine Negative Negative    Ketones Urine Negative Negative mg/dL    Specific Gravity Urine <=1.005 1.005 - 1.030    Blood Urine Large (A) Negative    pH Urine 6.0 5.0 - 8.0    Protein Albumin Urine Negative Negative mg/dL    Urobilinogen Urine 0.2 0.2, 1.0 E.U./dL    Nitrite Urine Negative Negative    Leukocyte Esterase Urine Small (A) Negative   UA Microscopic with Reflex to Culture     Status: Abnormal   Result Value Ref Range    Bacteria Urine Moderate (A) None Seen /HPF    RBC Urine 10-25 (A) 0-2 /HPF /HPF    WBC Urine 5-10 (A) 0-5 /HPF /HPF    Squamous Epithelials Urine Few (A) None Seen /LPF    Narrative    Urine Culture not indicated

## 2025-03-27 DIAGNOSIS — C91.10 CHRONIC LYMPHOCYTIC LEUKEMIA (H): Primary | ICD-10-CM

## 2025-03-27 DIAGNOSIS — D72.829 LEUKOCYTOSIS, UNSPECIFIED TYPE: ICD-10-CM

## 2025-04-02 ENCOUNTER — HOSPITAL ENCOUNTER (OUTPATIENT)
Dept: CT IMAGING | Facility: HOSPITAL | Age: 40
Discharge: HOME | End: 2025-04-02
Attending: INTERNAL MEDICINE
Payer: COMMERCIAL

## 2025-04-02 ENCOUNTER — LAB (OUTPATIENT)
Dept: INFUSION THERAPY | Facility: HOSPITAL | Age: 40
End: 2025-04-02
Attending: INTERNAL MEDICINE
Payer: COMMERCIAL

## 2025-04-02 ENCOUNTER — TELEPHONE (OUTPATIENT)
Dept: FAMILY MEDICINE | Facility: CLINIC | Age: 40
End: 2025-04-02

## 2025-04-02 ENCOUNTER — DOCUMENTATION ONLY (OUTPATIENT)
Dept: ONCOLOGY | Facility: HOSPITAL | Age: 40
End: 2025-04-02

## 2025-04-02 ENCOUNTER — ANCILLARY ORDERS (OUTPATIENT)
Dept: ONCOLOGY | Facility: HOSPITAL | Age: 40
End: 2025-04-02

## 2025-04-02 DIAGNOSIS — C91.10 CHRONIC LYMPHOCYTIC LEUKEMIA (H): ICD-10-CM

## 2025-04-02 DIAGNOSIS — C91.10 CHRONIC LYMPHOCYTIC LEUKEMIA (H): Primary | ICD-10-CM

## 2025-04-02 DIAGNOSIS — C83.00 SMALL LYMPHOCYTIC LYMPHOMA (H): ICD-10-CM

## 2025-04-02 DIAGNOSIS — D72.829 LEUKOCYTOSIS, UNSPECIFIED TYPE: ICD-10-CM

## 2025-04-02 LAB
ALBUMIN SERPL BCG-MCNC: 4.6 G/DL (ref 3.5–5.2)
ALP SERPL-CCNC: 94 U/L (ref 40–150)
ALT SERPL W P-5'-P-CCNC: 12 U/L (ref 0–50)
ANION GAP SERPL CALCULATED.3IONS-SCNC: 9 MMOL/L (ref 7–15)
AST SERPL W P-5'-P-CCNC: 18 U/L (ref 0–45)
BASOPHILS # BLD AUTO: 0.1 10E3/UL (ref 0–0.2)
BASOPHILS NFR BLD AUTO: 0 %
BILIRUB SERPL-MCNC: 0.6 MG/DL
BUN SERPL-MCNC: 9.1 MG/DL (ref 6–20)
CALCIUM SERPL-MCNC: 9.1 MG/DL (ref 8.8–10.4)
CHLORIDE SERPL-SCNC: 105 MMOL/L (ref 98–107)
CREAT SERPL-MCNC: 0.77 MG/DL (ref 0.51–0.95)
EGFRCR SERPLBLD CKD-EPI 2021: >90 ML/MIN/1.73M2
EOSINOPHIL # BLD AUTO: 0.1 10E3/UL (ref 0–0.7)
EOSINOPHIL NFR BLD AUTO: 0 %
ERYTHROCYTE [DISTWIDTH] IN BLOOD BY AUTOMATED COUNT: 13 % (ref 10–15)
GLUCOSE SERPL-MCNC: 99 MG/DL (ref 70–99)
HCO3 SERPL-SCNC: 25 MMOL/L (ref 22–29)
HCT VFR BLD AUTO: 42 % (ref 35–47)
HGB BLD-MCNC: 14.1 G/DL (ref 11.7–15.7)
IMM GRANULOCYTES # BLD: 0.1 10E3/UL
IMM GRANULOCYTES NFR BLD: 0 %
LDH SERPL L TO P-CCNC: 223 U/L (ref 0–250)
LYMPHOCYTES # BLD AUTO: 52.2 10E3/UL (ref 0.8–5.3)
LYMPHOCYTES NFR BLD AUTO: 91 %
MCH RBC QN AUTO: 30.3 PG (ref 26.5–33)
MCHC RBC AUTO-ENTMCNC: 33.6 G/DL (ref 31.5–36.5)
MCV RBC AUTO: 90 FL (ref 78–100)
MONOCYTES # BLD AUTO: 1.1 10E3/UL (ref 0–1.3)
MONOCYTES NFR BLD AUTO: 2 %
NEUTROPHILS # BLD AUTO: 4.2 10E3/UL (ref 1.6–8.3)
NEUTROPHILS NFR BLD AUTO: 7 %
NRBC # BLD AUTO: 0 10E3/UL
NRBC BLD AUTO-RTO: 0 /100
PATH REV: ABNORMAL
PLAT MORPH BLD: ABNORMAL
PLATELET # BLD AUTO: 210 10E3/UL (ref 150–450)
POTASSIUM SERPL-SCNC: 5 MMOL/L (ref 3.4–5.3)
PROT SERPL-MCNC: 7.3 G/DL (ref 6.4–8.3)
RBC # BLD AUTO: 4.65 10E6/UL (ref 3.8–5.2)
RBC MORPH BLD: ABNORMAL
SMUDGE CELLS BLD QL SMEAR: PRESENT
SODIUM SERPL-SCNC: 139 MMOL/L (ref 135–145)
WBC # BLD AUTO: 57.7 10E3/UL (ref 4–11)

## 2025-04-02 PROCEDURE — 74177 CT ABD & PELVIS W/CONTRAST: CPT

## 2025-04-02 PROCEDURE — 36415 COLL VENOUS BLD VENIPUNCTURE: CPT

## 2025-04-02 PROCEDURE — 83615 LACTATE (LD) (LDH) ENZYME: CPT

## 2025-04-02 PROCEDURE — 250N000011 HC RX IP 250 OP 636: Performed by: INTERNAL MEDICINE

## 2025-04-02 PROCEDURE — 82247 BILIRUBIN TOTAL: CPT

## 2025-04-02 PROCEDURE — 71260 CT THORAX DX C+: CPT

## 2025-04-02 PROCEDURE — 70491 CT SOFT TISSUE NECK W/DYE: CPT

## 2025-04-02 PROCEDURE — 85025 COMPLETE CBC W/AUTO DIFF WBC: CPT

## 2025-04-02 PROCEDURE — 82435 ASSAY OF BLOOD CHLORIDE: CPT

## 2025-04-02 PROCEDURE — 80053 COMPREHEN METABOLIC PANEL: CPT

## 2025-04-02 RX ORDER — IOPAMIDOL 755 MG/ML
90 INJECTION, SOLUTION INTRAVASCULAR ONCE
Status: COMPLETED | OUTPATIENT
Start: 2025-04-02 | End: 2025-04-02

## 2025-04-02 RX ADMIN — IOPAMIDOL 90 ML: 755 INJECTION, SOLUTION INTRAVENOUS at 08:23

## 2025-04-02 NOTE — TELEPHONE ENCOUNTER
Received call from [facility] lab with a critical finding. Lab was ordered by Dr. Witt. Instructed lab to call [facility] to contact Dr. Witt's clinic per protocol.

## 2025-04-02 NOTE — PROGRESS NOTES
DATE/TIME OF CALL RECEIVED FROM LAB:  04/02/25 at 9:37 AM   LAB TEST:  CBC/diff  LAB VALUE:  WBC 57.7  PROVIDER NOTIFIED?: Yes  PROVIDER NAME: Brielle Robles CNP (Dr. Witt is off) and GENE Mott (charge nurse)   DATE/TIME LAB VALUE REPORTED TO PROVIDER: 4/2/25 at 9:40 AM  MECHANISM OF PROVIDER NOTIFICATION:  Secure chat  PROVIDER RESPONSE: Noted. Patient will see Dr. Witt on 4/9 to review results.  Julianne Chua RN on 4/2/2025 at 9:49 AM

## 2025-04-09 ENCOUNTER — LAB (OUTPATIENT)
Dept: INFUSION THERAPY | Facility: HOSPITAL | Age: 40
End: 2025-04-09
Payer: COMMERCIAL

## 2025-04-09 ENCOUNTER — PATIENT OUTREACH (OUTPATIENT)
Dept: ONCOLOGY | Facility: HOSPITAL | Age: 40
End: 2025-04-09

## 2025-04-09 ENCOUNTER — ONCOLOGY VISIT (OUTPATIENT)
Dept: ONCOLOGY | Facility: HOSPITAL | Age: 40
End: 2025-04-09
Payer: COMMERCIAL

## 2025-04-09 VITALS
TEMPERATURE: 98.5 F | WEIGHT: 241 LBS | HEART RATE: 80 BPM | BODY MASS INDEX: 33.74 KG/M2 | SYSTOLIC BLOOD PRESSURE: 131 MMHG | DIASTOLIC BLOOD PRESSURE: 74 MMHG | RESPIRATION RATE: 16 BRPM | HEIGHT: 71 IN | OXYGEN SATURATION: 100 %

## 2025-04-09 DIAGNOSIS — C91.10 CLL (CHRONIC LYMPHOCYTIC LEUKEMIA) (H): Primary | ICD-10-CM

## 2025-04-09 LAB
Lab: NORMAL
PERFORMING LABORATORY: NORMAL
SPECIMEN STATUS: NORMAL
TEST NAME: NORMAL

## 2025-04-09 PROCEDURE — 99215 OFFICE O/P EST HI 40 MIN: CPT | Performed by: INTERNAL MEDICINE

## 2025-04-09 PROCEDURE — G2211 COMPLEX E/M VISIT ADD ON: HCPCS | Performed by: INTERNAL MEDICINE

## 2025-04-09 PROCEDURE — 36415 COLL VENOUS BLD VENIPUNCTURE: CPT | Performed by: INTERNAL MEDICINE

## 2025-04-09 PROCEDURE — 99214 OFFICE O/P EST MOD 30 MIN: CPT | Performed by: INTERNAL MEDICINE

## 2025-04-09 RX ORDER — IBUPROFEN 200 MG
600-800 TABLET ORAL PRN
COMMUNITY

## 2025-04-09 RX ORDER — CHOLECALCIFEROL (VITAMIN D3) 50 MCG
1 TABLET ORAL DAILY
COMMUNITY

## 2025-04-09 ASSESSMENT — PAIN SCALES - GENERAL: PAINLEVEL_OUTOF10: NO PAIN (0)

## 2025-04-09 NOTE — PROGRESS NOTES
Situation: Patient chart reviewed by care coordinator.    Background: CLL, in clinic for review of scan and labs. Noticeable progression of lymphadenopathy in neck. No B symptoms    Assessment: Not testing needed. Lab appointment made for after clinic visit.    Plan/Recommendations: Lab test of IGHV (in process). Follow up in 3 months. Dr. Witt considering clinical trials that may be suitable for her.     Emily Vazquez RN

## 2025-04-09 NOTE — PROGRESS NOTES
"Oncology Rooming Note    April 9, 2025 1:56 PM   Ani Aquino is a 39 year old female who presents for:    Chief Complaint   Patient presents with    Oncology Clinic Visit     6 month follow up with prior lab and CT review     Initial Vitals: /74 (BP Location: Left arm, Patient Position: Sitting, Cuff Size: Adult Large)   Pulse 80   Temp 98.5  F (36.9  C) (Tympanic)   Resp 16   Ht 1.803 m (5' 11\")   Wt 109.3 kg (241 lb)   LMP 03/15/2025 (Approximate)   SpO2 100%   BMI 33.61 kg/m   Estimated body mass index is 33.61 kg/m  as calculated from the following:    Height as of this encounter: 1.803 m (5' 11\").    Weight as of this encounter: 109.3 kg (241 lb). Body surface area is 2.34 meters squared.  No Pain (0) Comment: Data Unavailable   Patient's last menstrual period was 03/15/2025 (approximate).  Allergies reviewed: Yes  Medications reviewed: Yes    Medications: Medication refills not needed today.  Pharmacy name entered into EPIC:    [facility] #[id number] - [place], [place] - [address] AT [facility] & [address]  [place] [facility] [address] [place] AQUINO  [facility] #[id number] - [place], [place] - [address] AT [facility] OF [address]  [place] [facility] [place], [place] - [address] [facility]    Frailty Screening:   Is the patient here for a new oncology consult visit in cancer care? 2. No    PHQ9:  Did this patient require a PHQ9?: No      Clinical concerns:       MELANIE SOFIA CMA            "

## 2025-04-09 NOTE — LETTER
"4/9/2025      Ani Aquino  [address] [facility] [address] [facility] [address]      Dear Colleague,    Thank you for referring your patient, Ani Aquino, to the [facility] [place]. Please see a copy of my visit note below.    Oncology Rooming Note    April 9, 2025 1:56 PM   Ani Aquino is a 39 year old female who presents for:    Chief Complaint   Patient presents with     Oncology Clinic Visit     6 month follow up with prior lab and CT review     Initial Vitals: /74 (BP Location: Left arm, Patient Position: Sitting, Cuff Size: Adult Large)   Pulse 80   Temp 98.5  F (36.9  C) (Tympanic)   Resp 16   Ht 1.803 m (5' 11\")   Wt 109.3 kg (241 lb)   LMP 03/15/2025 (Approximate)   SpO2 100%   BMI 33.61 kg/m   Estimated body mass index is 33.61 kg/m  as calculated from the following:    Height as of this encounter: 1.803 m (5' 11\").    Weight as of this encounter: 109.3 kg (241 lb). Body surface area is 2.34 meters squared.  No Pain (0) Comment: Data Unavailable   Patient's last menstrual period was 03/15/2025 (approximate).  Allergies reviewed: Yes  Medications reviewed: Yes    Medications: Medication refills not needed today.  Pharmacy name entered into EPIC:    [id number] - [facility] [address] [facility] [address] [facility] [address]  [place] [facility] [address] [place] [address] [address]  [id number] - [facility] [address] [facility] OF [address] & [facility] [address] [facility]    Frailty Screening:   Is the patient here for a new oncology consult visit in cancer care? 2. No    PHQ9:  Did this patient require a PHQ9?: No      Clinical concerns:       MELANIE SOFIA CMA                  [facility] Progress Note    Patient: Ani Aquino  MRN: [id number]  4/09/25        Reason for Visit    Chief Complaint "   Patient presents with     Oncology Clinic Visit     6 month follow up with prior lab and CT review         Problem List Items Addressed This Visit       CLL (chronic lymphocytic leukemia) (H) - Primary            Cancer Staging   CLL (chronic lymphocytic leukemia) (H)  Staging form: Chronic Lymphocytic Leukemia, AJCC 8th Edition  - Clinical: Modified Youngblood Stage II (Modified Youngblood risk: Intermediate, Binet: Stage C, Lymphocytosis: Present, Adenopathy: Present, Organomegaly: Present, Anemia: Absent, Thrombocytopenia: Absent) - Signed by Jory Witt MD on 4/9/2025        Assessment and Plan  Chronic lymphocytic leukemia  Youngblood stage I  Cytogenetics: 13 q. deletion present, no evidence of 17 P deletion  Incidentally found elevated white count.  Peripheral smear showed absolute lymphocytosis which appears mature.  Flow cytometry showed monotypic kappa restricted B cells, CD5 positive, CD19 positive, CD20 dim, CD23 positive and lacked CD10, CD38, CD49d and CD79b.  No aberrant expression of any T-cell markers.  FISH on peripheral blood came back showing deletion 13 q.  No evidence of 11: 14 translocation or 17 P deletion.    Clinically doing well without any B symptoms however she has noticeable progression of lymphadenopathy in the neck.  Has bilateral cervical and supraclavicular lymph nodes left more than right.  Her total white count has also gone above 50,000 last time so I recommended repeating labs in a short interval and repeating a CT of the neck and chest abdomen pelvis.  She is here to review the results.    Results  Lab results  - White blood cell count: 57,000  - Hemoglobin: Normal  - Platelet count: Normal  - LDH enzyme: Normal  - Liver function tests: Normal  - Kidney function tests: Normal    Imaging results  - Lymph nodes: Increased in size, none meet bulky criteria (largest 4 cm, 3.5 cm, 3.4 cm, 3.6 cm)  - Spleen: Slightly increased in size, 13 cm, not significant       - Chronic lymphocytic  leukemia (CLL) with evidence of slight disease progression.  - No current absolute indication for treatment based on the absence of B sx, bulky disease or high-risk genetic mutations.  Splenomegaly noted on the CT scan but it is mild and not considered as bulky splenomegaly.  This makes her right stage II.  -He has FISH testing showed no evidence of any high risk features.  She has 13 q. deletion which generally has a good prognostic value.  17 PE was negative.  11: 14 translocation was also negative.    -Splane to her and her  that although she is not clinically symptomatic there is evidence of disease progression especially with progressive lymphadenopathy and increase in the white count.  So I think at some point in the near future she may need systemic therapy.  I have ordered immunoglobulin heavy chain mutation testing.  If she is mutated then potentially could be a candidate for FCR in the future.  Otherwise we will treat her with targeted therapy with a combination of Bcl-2 inhibitor or BTK inhibitor (or both) with or without anti-CD20 antibody.  -I also discussed about clinical trial options which she will be a great candidate for.  Unfortunately I do not have any really good clinical trial here.  We have a trial comparing early versus late treatment with venetoclax and obinutuzumab.  But considering her age I want to be as aggressive as possible.  So we will reach out to [facility] team to see if they have any ongoing clinical trial suitable for her.    Plan  - Perform [facility] mutation testing to assess the immunoglobulin gene within lymphoma cells, which may influence treatment decisions.  - Explore potential clinical trial options at the [facility] to determine eligibility and potential benefits over standard of care.  - Schedule follow-up lab tests in 3 months to closely monitor disease progression and adjust management as necessary.  - Recommend lifestyle modifications including a balanced diet with  good proteins, more complex carbohydrates, and regular exercise to support overall health.      Appointments  - Follow-up lab appointment in 3 months for close monitoring of condition.     Liver lesion  Showed focal nodular fatty infiltration.  No concerns for any abnormal lesion.  Will continue to monitor.     Cancer Staging   CLL (chronic lymphocytic leukemia) (H)  Staging form: Chronic Lymphocytic Leukemia, AJCC 8th Edition  - Clinical: Modified Youngblood Stage II (Modified Youngblood risk: Intermediate, Binet: Stage C, Lymphocytosis: Present, Adenopathy: Present, Organomegaly: Present, Anemia: Absent, Thrombocytopenia: Absent) - Signed by Jory Witt MD on 2025      ECOG Performance    0 - Independent         Problem List    Patient Active Problem List   Diagnosis     History of      Irregular heart beat     Hip pain, right     SI (sacroiliac) joint dysfunction     Palpitations     CLL (chronic lymphocytic leukemia) (H)            Interval History   Ani Aquino is a 38 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    Her CLL/SLL was discovered after she was incidentally noted to have elevated white count which was predominantly lymphocytes.  Flow cytometry showed monoclonal B cells with immunophenotype most consistent with CLL.  Cytogenetics showed 13 q. deletion.  No 11: 14 translocation or 17 P deletion.  On CT scan done in April of last year showed extensive lymphadenopathy above and below the diaphragm which did not meet the criteria for bulky disease.  I have been following her closely with repeat labs.    Here with repeat labs and CT scan.  She has noted that increase in size of the neck lymph nodes.  Denies any fevers chills or night sweats.  No recurrent infections.  She may have a dental infection and is seeing a dentist tomorrow.  No excessive fatigue.  No weight loss.      Review of Systems  A comprehensive review of systems was negative except for what is noted in the interval  "history    Current Outpatient Medications   Medication Sig Dispense Refill     ASHWAGANDHA GUMMIES PO Take 2 chew tab by mouth 2 times daily.       ibuprofen (ADVIL) 200 MG tablet Take 600-800 mg by mouth as needed for pain.       Misc Natural Products (SUPER GREENS PO) Take 3 chew tab by mouth daily.       Multiple Vitamins-Minerals (MULTIVITAMIN WOMEN PO) Take 2 chew tab by mouth 2 times daily.       vitamin D3 (CHOLECALCIFEROL) 50 mcg (2000 units) tablet Take 1 tablet by mouth daily.       No current facility-administered medications for this visit.        Physical Exam    Failed to redirect to the Timeline version of the REVFS SmartLink.    General: alert and cooperative  Lymph nodes: Bilateral cervical, supraclavicular  Lymphadenopathy, left more than right      Lab Results    Recent Results (from the past week)   [facility]; BCLL; [facility] Somatic Hypermutation Analysis, B-Cell Chronic Lymphocytic Leukemia (B-CLL), Varies\" (Laboratory Miscellaneous Order)   Result Value Ref Range    Specimen Status       Specimen received. Reordered and sent to performing laboratory. Report to follow upon completion.    Performing Laboratory [facility]     Test Name       [facility] Somatic Hypermutation Analysis, B-Cell Chronic Lymphocytic Leukemia (B-CLL), Varies\"    Test Code BCLL        Imaging    CT Soft Tissue Neck w Contrast    Result Date: 4/2/2025  EXAM: CT SOFT TISSUE NECK WITH CONTRAST LOCATION: [facility] DATE: 04/02/2025 INDICATION: Chronic lymphocytic leukemia (H). Small lymphocytic lymphoma (H). COMPARISON: CT 04/10/2024. CONTRAST: Isovue 370, 90 mL. TECHNIQUE: Routine CT Soft Tissue Neck with IV contrast. Multiplanar reformats. Dose reduction techniques were used. FINDINGS: MUCOSAL SPACES/SOFT TISSUES: Unchanged prominent bilateral palatine tonsils. Otherwise, no abnormality of the mucosal surfaces.. Normal parapharyngeal space and subcutaneous soft tissues. Normal " oral cavity,  spaces, and floor of mouth structures. LYMPH NODES: Again demonstrated are numerous enlarged bilateral cervical lymph nodes in all stations and bilateral axilla. Compared to 04/10/2024, nearly all lymph nodes have increased in size. Representative examples may include to 3.4 cm right level 2 lymph node, previously measuring 2.0 cm, 3.0 cm left level 2A node, previously measuring 2.6 cm, 2.4 cm right level 5 node, previously measuring 2.0 cm. SALIVARY GLANDS: Normal parotid and submandibular glands. THYROID: Normal. VESSELS: Vascular structures of the neck are patent. VISUALIZED INTRACRANIAL/ORBITS/SINUSES: No abnormality of the visualized intracranial compartment or orbits. Visualized paranasal sinuses and mastoid air cells are clear. OTHER: No destructive osseous lesion. The included lung apices are clear.     IMPRESSION: 1.  Numerous enlarged bilateral cervical lymph nodes in all stations and bilateral axilla, consistent with given history of chronic lymphocytic leukemia. Compared to 04/10/2024, there has been interval increase in the size of nearly all lymph nodes.     CT Chest/Abdomen/Pelvis w Contrast    Result Date: 4/2/2025  EXAM: CT CHEST/ABDOMEN/PELVIS W CONTRAST LOCATION: [facility] DATE: 4/2/2025 INDICATION:  Chronic lymphocytic leukemia (H), Small lymphocytic lymphoma (H) COMPARISON: 4/10/2024 TECHNIQUE: CT scan of the chest, abdomen, and pelvis was performed following injection of IV contrast. Multiplanar reformats were obtained. Dose reduction techniques were used. CONTRAST: IsoVue 370 90ml FINDINGS: LUNGS AND PLEURA: Small stable benign nodule associated with the right major fissure. Otherwise negative. MEDIASTINUM/AXILLAE: Progression of axillary lymphadenopathy. Representative right upper subpectoral node measures 4.0 x 2.3 cm comparison 3.5 x 1.8 cm. Distended node in the left axilla measuring 3.6 x 1.8 cm in comparison to 2.4 x 1.0 cm. Multiple  enlarged nodes in the lower neck and supraclavicular regions now seen with continued subcentimeter mediastinal and hilar nodes. CORONARY ARTERY CALCIFICATION: None. HEPATOBILIARY: The somewhat ill-defined hypodense infiltrating region seen in the superior right hepatic lobe less well seen today although minimally present most likely fatty infiltration. PANCREAS: Normal. SPLEEN: Splenomegaly. Spleen measures 13.2 cm in length comparison to 12.2 cm. ADRENAL GLANDS: Normal. KIDNEYS/BLADDER: Normal. BOWEL: Normal. LYMPH NODES: Progression of retroperitoneal and mesenteric lymphadenopathy with the largest retroperitoneal nodes measuring 1.4 cm. Progression of iliac lymphadenopathy as well as bulky pelvic sidewall lymphadenopathy. Representative node in the right pelvic sidewall previous CT measured 3.8 x 1.6 cm and measures 5.4 x 2.7 cm in today's exam. VASCULATURE: Normal. PELVIC ORGANS: Normal. MUSCULOSKELETAL: Normal.     IMPRESSION: 1.  Progression of lymphadenopathy in the chest, abdomen and pelvis.      The longitudinal plan of care for the diagnosis(es)/condition(s) as documented were addressed during this visit. Due to the added complexity in care, I will continue to support Ani in the subsequent management and with ongoing continuity of care.    A total of 45 min was spent today on this visit which included face to face conversation with the patient, EMR review, counseling and co-ordination of care and medical documentation.    I have personally reviewed the CT scan images and report and independently interpreted the results to my ability.        Signed by: Jory Witt MD      Again, thank you for allowing me to participate in the care of your patient.        Sincerely,        Jory Witt MD    Electronically signed

## 2025-04-10 NOTE — PROGRESS NOTES
[facility] Hematology and Oncology Progress Note    Patient: Ani Aquino  MRN: [id number]  4/09/25        Reason for Visit    Chief Complaint   Patient presents with    Oncology Clinic Visit     6 month follow up with prior lab and CT review         Problem List Items Addressed This Visit       CLL (chronic lymphocytic leukemia) (H) - Primary            Cancer Staging   CLL (chronic lymphocytic leukemia) (H)  Staging form: Chronic Lymphocytic Leukemia, AJCC 8th Edition  - Clinical: Modified Youngblood Stage II (Modified Youngblood risk: Intermediate, Binet: Stage C, Lymphocytosis: Present, Adenopathy: Present, Organomegaly: Present, Anemia: Absent, Thrombocytopenia: Absent) - Signed by Jory Witt MD on 4/9/2025        Assessment and Plan  Chronic lymphocytic leukemia  Youngblood stage I  Cytogenetics: 13 q. deletion present, no evidence of 17 P deletion  Incidentally found elevated white count.  Peripheral smear showed absolute lymphocytosis which appears mature.  Flow cytometry showed monotypic kappa restricted B cells, CD5 positive, CD19 positive, CD20 dim, CD23 positive and lacked CD10, CD38, CD49d and CD79b.  No aberrant expression of any T-cell markers.  FISH on peripheral blood came back showing deletion 13 q.  No evidence of 11: 14 translocation or 17 P deletion.    Clinically doing well without any B symptoms however she has noticeable progression of lymphadenopathy in the neck.  Has bilateral cervical and supraclavicular lymph nodes left more than right.  Her total white count has also gone above 50,000 last time so I recommended repeating labs in a short interval and repeating a CT of the neck and chest abdomen pelvis.  She is here to review the results.    Results  Lab results  - White blood cell count: 57,000  - Hemoglobin: Normal  - Platelet count: Normal  - LDH enzyme: Normal  - Liver function tests: Normal  - Kidney function tests: Normal    Imaging results  - Lymph nodes: Increased in size, none  meet bulky criteria (largest 4 cm, 3.5 cm, 3.4 cm, 3.6 cm)  - Spleen: Slightly increased in size, 13 cm, not significant       - Chronic lymphocytic leukemia (CLL) with evidence of slight disease progression.  - No current absolute indication for treatment based on the absence of B sx, bulky disease or high-risk genetic mutations.  Splenomegaly noted on the CT scan but it is mild and not considered as bulky splenomegaly.  This makes her right stage II.  -He has FISH testing showed no evidence of any high risk features.  She has 13 q. deletion which generally has a good prognostic value.  17 PE was negative.  11: 14 translocation was also negative.    -Splane to her and her  that although she is not clinically symptomatic there is evidence of disease progression especially with progressive lymphadenopathy and increase in the white count.  So I think at some point in the near future she may need systemic therapy.  I have ordered immunoglobulin heavy chain mutation testing.  If she is mutated then potentially could be a candidate for FCR in the future.  Otherwise we will treat her with targeted therapy with a combination of Bcl-2 inhibitor or BTK inhibitor (or both) with or without anti-CD20 antibody.  -I also discussed about clinical trial options which she will be a great candidate for.  Unfortunately I do not have any really good clinical trial here.  We have a trial comparing early versus late treatment with venetoclax and obinutuzumab.  But considering her age I want to be as aggressive as possible.  So we will reach out to [facility] team to see if they have any ongoing clinical trial suitable for her.    Plan  - Perform [facility] mutation testing to assess the immunoglobulin gene within lymphoma cells, which may influence treatment decisions.  - Explore potential clinical trial options at the university to determine eligibility and potential benefits over standard of care.  - Schedule follow-up lab tests in 3  months to closely monitor disease progression and adjust management as necessary.  - Recommend lifestyle modifications including a balanced diet with good proteins, more complex carbohydrates, and regular exercise to support overall health.      Appointments  - Follow-up lab appointment in 3 months for close monitoring of condition.     Liver lesion  Showed focal nodular fatty infiltration.  No concerns for any abnormal lesion.  Will continue to monitor.     Cancer Staging   CLL (chronic lymphocytic leukemia) (H)  Staging form: Chronic Lymphocytic Leukemia, AJCC 8th Edition  - Clinical: Modified Youngblood Stage II (Modified Youngblood risk: Intermediate, Binet: Stage C, Lymphocytosis: Present, Adenopathy: Present, Organomegaly: Present, Anemia: Absent, Thrombocytopenia: Absent) - Signed by Jory Witt MD on 2025      ECOG Performance    0 - Independent         Problem List    Patient Active Problem List   Diagnosis    History of     Irregular heart beat    Hip pain, right    SI (sacroiliac) joint dysfunction    Palpitations    CLL (chronic lymphocytic leukemia) (H)            Interval History   Ani Aquino is a 38 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    Her CLL/SLL was discovered after she was incidentally noted to have elevated white count which was predominantly lymphocytes.  Flow cytometry showed monoclonal B cells with immunophenotype most consistent with CLL.  Cytogenetics showed 13 q. deletion.  No 11: 14 translocation or 17 P deletion.  On CT scan done in April of last year showed extensive lymphadenopathy above and below the diaphragm which did not meet the criteria for bulky disease.  I have been following her closely with repeat labs.    Here with repeat labs and CT scan.  She has noted that increase in size of the neck lymph nodes.  Denies any fevers chills or night sweats.  No recurrent infections.  She may have a dental infection and is seeing a dentist tomorrow.  No excessive  "fatigue.  No weight loss.      Review of Systems  A comprehensive review of systems was negative except for what is noted in the interval history    Current Outpatient Medications   Medication Sig Dispense Refill    ASHWAGANDHA GUMMIES PO Take 2 chew tab by mouth 2 times daily.      ibuprofen (ADVIL) 200 MG tablet Take 600-800 mg by mouth as needed for pain.      Misc Natural Products (SUPER GREENS PO) Take 3 chew tab by mouth daily.      Multiple Vitamins-Minerals (MULTIVITAMIN WOMEN PO) Take 2 chew tab by mouth 2 times daily.      vitamin D3 (CHOLECALCIFEROL) 50 mcg (2000 units) tablet Take 1 tablet by mouth daily.       No current facility-administered medications for this visit.        Physical Exam    Failed to redirect to the Timeline version of the [employer] SmartLink.    General: alert and cooperative  Lymph nodes: Bilateral cervical, supraclavicular  Lymphadenopathy, left more than right      Lab Results    Recent Results (from the past week)   [facility]; BCLL; [facility] Somatic Hypermutation Analysis, B-Cell Chronic Lymphocytic Leukemia (B-CLL), Varies\" (Laboratory Miscellaneous Order)   Result Value Ref Range    Specimen Status       Specimen received. Reordered and sent to performing laboratory. Report to follow upon completion.    Performing Laboratory [facility]     Test Name       [facility] Somatic Hypermutation Analysis, B-Cell Chronic Lymphocytic Leukemia (B-CLL), Varies\"    Test Code BCLL        Imaging    CT Soft Tissue Neck w Contrast    Result Date: 4/2/2025  EXAM: CT SOFT TISSUE NECK WITH CONTRAST LOCATION: [facility] DATE: 04/02/2025 INDICATION: Chronic lymphocytic leukemia (H). Small lymphocytic lymphoma (H). COMPARISON: CT 04/10/2024. CONTRAST: Isovue 370, 90 mL. TECHNIQUE: Routine CT Soft Tissue Neck with IV contrast. Multiplanar reformats. Dose reduction techniques were used. FINDINGS: MUCOSAL SPACES/SOFT TISSUES: Unchanged prominent bilateral " palatine tonsils. Otherwise, no abnormality of the mucosal surfaces.. Normal parapharyngeal space and subcutaneous soft tissues. Normal oral cavity,  spaces, and floor of mouth structures. LYMPH NODES: Again demonstrated are numerous enlarged bilateral cervical lymph nodes in all stations and bilateral axilla. Compared to 04/10/2024, nearly all lymph nodes have increased in size. Representative examples may include to 3.4 cm right level 2 lymph node, previously measuring 2.0 cm, 3.0 cm left level 2A node, previously measuring 2.6 cm, 2.4 cm right level 5 node, previously measuring 2.0 cm. SALIVARY GLANDS: Normal parotid and submandibular glands. THYROID: Normal. VESSELS: Vascular structures of the neck are patent. VISUALIZED INTRACRANIAL/ORBITS/SINUSES: No abnormality of the visualized intracranial compartment or orbits. Visualized paranasal sinuses and mastoid air cells are clear. OTHER: No destructive osseous lesion. The included lung apices are clear.     IMPRESSION: 1.  Numerous enlarged bilateral cervical lymph nodes in all stations and bilateral axilla, consistent with given history of chronic lymphocytic leukemia. Compared to 04/10/2024, there has been interval increase in the size of nearly all lymph nodes.     CT Chest/Abdomen/Pelvis w Contrast    Result Date: 4/2/2025  EXAM: CT CHEST/ABDOMEN/PELVIS W CONTRAST LOCATION: [facility] DATE: 4/2/2025 INDICATION:  Chronic lymphocytic leukemia (H), Small lymphocytic lymphoma (H) COMPARISON: 4/10/2024 TECHNIQUE: CT scan of the chest, abdomen, and pelvis was performed following injection of IV contrast. Multiplanar reformats were obtained. Dose reduction techniques were used. CONTRAST: IsoVue 370 90ml FINDINGS: LUNGS AND PLEURA: Small stable benign nodule associated with the right major fissure. Otherwise negative. MEDIASTINUM/AXILLAE: Progression of axillary lymphadenopathy. Representative right upper subpectoral node measures 4.0  x 2.3 cm comparison 3.5 x 1.8 cm. Distended node in the left axilla measuring 3.6 x 1.8 cm in comparison to 2.4 x 1.0 cm. Multiple enlarged nodes in the lower neck and supraclavicular regions now seen with continued subcentimeter mediastinal and hilar nodes. CORONARY ARTERY CALCIFICATION: None. HEPATOBILIARY: The somewhat ill-defined hypodense infiltrating region seen in the superior right hepatic lobe less well seen today although minimally present most likely fatty infiltration. PANCREAS: Normal. SPLEEN: Splenomegaly. Spleen measures 13.2 cm in length comparison to 12.2 cm. ADRENAL GLANDS: Normal. KIDNEYS/BLADDER: Normal. BOWEL: Normal. LYMPH NODES: Progression of retroperitoneal and mesenteric lymphadenopathy with the largest retroperitoneal nodes measuring 1.4 cm. Progression of iliac lymphadenopathy as well as bulky pelvic sidewall lymphadenopathy. Representative node in the right pelvic sidewall previous CT measured 3.8 x 1.6 cm and measures 5.4 x 2.7 cm in today's exam. VASCULATURE: Normal. PELVIC ORGANS: Normal. MUSCULOSKELETAL: Normal.     IMPRESSION: 1.  Progression of lymphadenopathy in the chest, abdomen and pelvis.      The longitudinal plan of care for the diagnosis(es)/condition(s) as documented were addressed during this visit. Due to the added complexity in care, I will continue to support Ani in the subsequent management and with ongoing continuity of care.    A total of 45 min was spent today on this visit which included face to face conversation with the patient, EMR review, counseling and co-ordination of care and medical documentation.    I have personally reviewed the CT scan images and report and independently interpreted the results to my ability.        Signed by: Jory Witt MD

## 2025-04-16 LAB — MAYO MISC RESULT: NORMAL

## 2025-04-17 ENCOUNTER — PATIENT OUTREACH (OUTPATIENT)
Dept: ONCOLOGY | Facility: HOSPITAL | Age: 40
End: 2025-04-17
Payer: COMMERCIAL

## 2025-04-17 NOTE — PROGRESS NOTES
[facility]: Cancer Care                                                                                      RN Cancer Care Coordinator sent MyC message updating about lab results and plan.    Signature:  Emily Vazquez RN

## 2025-07-19 ENCOUNTER — HEALTH MAINTENANCE LETTER (OUTPATIENT)
Age: 40
End: 2025-07-19

## 2025-08-05 DIAGNOSIS — C91.10 CLL (CHRONIC LYMPHOCYTIC LEUKEMIA) (H): Primary | ICD-10-CM

## 2025-08-05 DIAGNOSIS — D72.829 LEUKOCYTOSIS, UNSPECIFIED TYPE: ICD-10-CM

## 2025-08-05 DIAGNOSIS — C91.10 CHRONIC LYMPHOCYTIC LEUKEMIA (H): ICD-10-CM

## 2025-08-05 DIAGNOSIS — K76.9 LIVER LESION: ICD-10-CM

## 2025-08-06 ENCOUNTER — RESULT FOLLOW UP (OUTPATIENT)
Dept: ONCOLOGY | Facility: HOSPITAL | Age: 40
End: 2025-08-06

## 2025-08-06 ENCOUNTER — LAB (OUTPATIENT)
Dept: INFUSION THERAPY | Facility: HOSPITAL | Age: 40
End: 2025-08-06
Attending: INTERNAL MEDICINE
Payer: COMMERCIAL

## 2025-08-06 ENCOUNTER — ONCOLOGY VISIT (OUTPATIENT)
Dept: ONCOLOGY | Facility: HOSPITAL | Age: 40
End: 2025-08-06
Attending: INTERNAL MEDICINE
Payer: COMMERCIAL

## 2025-08-06 VITALS
HEART RATE: 73 BPM | OXYGEN SATURATION: 99 % | BODY MASS INDEX: 33.04 KG/M2 | DIASTOLIC BLOOD PRESSURE: 71 MMHG | RESPIRATION RATE: 16 BRPM | SYSTOLIC BLOOD PRESSURE: 132 MMHG | TEMPERATURE: 97.9 F | HEIGHT: 71 IN | WEIGHT: 236 LBS

## 2025-08-06 DIAGNOSIS — C91.10 CHRONIC LYMPHOCYTIC LEUKEMIA (H): ICD-10-CM

## 2025-08-06 DIAGNOSIS — C91.10 CLL (CHRONIC LYMPHOCYTIC LEUKEMIA) (H): Primary | ICD-10-CM

## 2025-08-06 DIAGNOSIS — D72.829 LEUKOCYTOSIS, UNSPECIFIED TYPE: ICD-10-CM

## 2025-08-06 DIAGNOSIS — K76.9 LIVER LESION: ICD-10-CM

## 2025-08-06 LAB
ALBUMIN SERPL BCG-MCNC: 4.7 G/DL (ref 3.5–5.2)
ALP SERPL-CCNC: 106 U/L (ref 40–150)
ALT SERPL W P-5'-P-CCNC: 17 U/L (ref 0–50)
ANION GAP SERPL CALCULATED.3IONS-SCNC: 10 MMOL/L (ref 7–15)
AST SERPL W P-5'-P-CCNC: 17 U/L (ref 0–45)
BASOPHILS # BLD MANUAL: 0 10E3/UL (ref 0–0.2)
BASOPHILS NFR BLD MANUAL: 0 %
BILIRUB SERPL-MCNC: 0.5 MG/DL
BUN SERPL-MCNC: 10.1 MG/DL (ref 6–20)
CALCIUM SERPL-MCNC: 9.3 MG/DL (ref 8.8–10.4)
CHLORIDE SERPL-SCNC: 106 MMOL/L (ref 98–107)
CREAT SERPL-MCNC: 0.86 MG/DL (ref 0.51–0.95)
EGFRCR SERPLBLD CKD-EPI 2021: 89 ML/MIN/1.73M2
EOSINOPHIL # BLD MANUAL: 0 10E3/UL (ref 0–0.7)
EOSINOPHIL NFR BLD MANUAL: 0 %
ERYTHROCYTE [DISTWIDTH] IN BLOOD BY AUTOMATED COUNT: 13.2 % (ref 10–15)
GLUCOSE SERPL-MCNC: 89 MG/DL (ref 70–99)
HCO3 SERPL-SCNC: 26 MMOL/L (ref 22–29)
HCT VFR BLD AUTO: 40.6 % (ref 35–47)
HGB BLD-MCNC: 12.7 G/DL (ref 11.7–15.7)
LAB ORDER RESULT STATUS: NORMAL
LDH SERPL L TO P-CCNC: 193 U/L (ref 0–250)
LYMPHOCYTES # BLD MANUAL: 79.9 10E3/UL (ref 0.8–5.3)
LYMPHOCYTES NFR BLD MANUAL: 91 %
Lab: NORMAL
MCH RBC QN AUTO: 28.3 PG (ref 26.5–33)
MCHC RBC AUTO-ENTMCNC: 31.3 G/DL (ref 31.5–36.5)
MCV RBC AUTO: 90 FL (ref 78–100)
MONOCYTES # BLD MANUAL: 0.9 10E3/UL (ref 0–1.3)
MONOCYTES NFR BLD MANUAL: 1 %
NEUTROPHILS # BLD MANUAL: 7 10E3/UL (ref 1.6–8.3)
NEUTROPHILS NFR BLD MANUAL: 8 %
PERFORMING LABORATORY: NORMAL
PLAT MORPH BLD: ABNORMAL
PLATELET # BLD AUTO: 236 10E3/UL (ref 150–450)
POTASSIUM SERPL-SCNC: 3.8 MMOL/L (ref 3.4–5.3)
PROT SERPL-MCNC: 7.3 G/DL (ref 6.4–8.3)
RBC # BLD AUTO: 4.49 10E6/UL (ref 3.8–5.2)
RBC MORPH BLD: ABNORMAL
SMUDGE CELLS BLD QL SMEAR: PRESENT
SODIUM SERPL-SCNC: 142 MMOL/L (ref 135–145)
TEST NAME: NORMAL
VARIANT LYMPHS BLD QL SMEAR: PRESENT
WBC # BLD AUTO: 87.8 10E3/UL (ref 4–11)

## 2025-08-06 PROCEDURE — 85027 COMPLETE CBC AUTOMATED: CPT

## 2025-08-06 PROCEDURE — 85007 BL SMEAR W/DIFF WBC COUNT: CPT

## 2025-08-06 PROCEDURE — 83615 LACTATE (LD) (LDH) ENZYME: CPT

## 2025-08-06 PROCEDURE — 99213 OFFICE O/P EST LOW 20 MIN: CPT | Performed by: INTERNAL MEDICINE

## 2025-08-06 PROCEDURE — 82947 ASSAY GLUCOSE BLOOD QUANT: CPT

## 2025-08-06 PROCEDURE — G2211 COMPLEX E/M VISIT ADD ON: HCPCS | Performed by: INTERNAL MEDICINE

## 2025-08-06 PROCEDURE — 99214 OFFICE O/P EST MOD 30 MIN: CPT | Performed by: INTERNAL MEDICINE

## 2025-08-06 PROCEDURE — 36415 COLL VENOUS BLD VENIPUNCTURE: CPT

## 2025-08-06 ASSESSMENT — PAIN SCALES - GENERAL: PAINLEVEL_OUTOF10: NO PAIN (0)
